# Patient Record
Sex: FEMALE | Race: WHITE | Employment: FULL TIME | URBAN - METROPOLITAN AREA
[De-identification: names, ages, dates, MRNs, and addresses within clinical notes are randomized per-mention and may not be internally consistent; named-entity substitution may affect disease eponyms.]

---

## 2019-07-26 ENCOUNTER — HOSPITAL ENCOUNTER (EMERGENCY)
Age: 19
Discharge: HOME | End: 2019-07-26
Attending: EMERGENCY MEDICINE
Payer: OTHER GOVERNMENT

## 2019-07-26 VITALS
WEIGHT: 140 LBS | TEMPERATURE: 98.3 F | SYSTOLIC BLOOD PRESSURE: 119 MMHG | OXYGEN SATURATION: 98 % | DIASTOLIC BLOOD PRESSURE: 68 MMHG | HEART RATE: 80 BPM | HEIGHT: 60 IN | BODY MASS INDEX: 27.48 KG/M2 | RESPIRATION RATE: 18 BRPM

## 2019-07-26 DIAGNOSIS — N94.89 FEMALE PELVIC CONGESTION SYNDROME: Primary | ICD-10-CM

## 2019-07-26 LAB
ALBUMIN SERPL-MCNC: 4.1 G/DL (ref 3.5–5)
ALBUMIN/GLOB SERPL: 1.1 {RATIO} (ref 1.2–3.5)
ALP SERPL-CCNC: 59 U/L (ref 50–136)
ALT SERPL-CCNC: 15 U/L (ref 12–65)
ANION GAP SERPL CALC-SCNC: 5 MMOL/L (ref 7–16)
AST SERPL-CCNC: 16 U/L (ref 15–37)
BACTERIA URNS QL MICRO: NORMAL /HPF
BASOPHILS # BLD: 0.1 K/UL (ref 0–0.2)
BASOPHILS NFR BLD: 1 % (ref 0–2)
BILIRUB SERPL-MCNC: 0.4 MG/DL (ref 0.2–1.1)
BUN SERPL-MCNC: 14 MG/DL (ref 6–23)
CALCIUM SERPL-MCNC: 8.7 MG/DL (ref 8.3–10.4)
CASTS URNS QL MICRO: 0 /LPF
CHLORIDE SERPL-SCNC: 105 MMOL/L (ref 98–107)
CO2 SERPL-SCNC: 28 MMOL/L (ref 21–32)
CREAT SERPL-MCNC: 0.64 MG/DL (ref 0.6–1)
DIFFERENTIAL METHOD BLD: NORMAL
EOSINOPHIL # BLD: 0.1 K/UL (ref 0–0.8)
EOSINOPHIL NFR BLD: 2 % (ref 0.5–7.8)
EPI CELLS #/AREA URNS HPF: NORMAL /HPF
ERYTHROCYTE [DISTWIDTH] IN BLOOD BY AUTOMATED COUNT: 13.2 % (ref 11.9–14.6)
GLOBULIN SER CALC-MCNC: 3.7 G/DL (ref 2.3–3.5)
GLUCOSE SERPL-MCNC: 87 MG/DL (ref 65–100)
HCG UR QL: NEGATIVE
HCT VFR BLD AUTO: 37.9 % (ref 35.8–46.3)
HGB BLD-MCNC: 12.3 G/DL (ref 11.7–15.4)
IMM GRANULOCYTES # BLD AUTO: 0 K/UL (ref 0–0.5)
IMM GRANULOCYTES NFR BLD AUTO: 0 % (ref 0–5)
LYMPHOCYTES # BLD: 1.8 K/UL (ref 0.5–4.6)
LYMPHOCYTES NFR BLD: 30 % (ref 13–44)
MCH RBC QN AUTO: 28.3 PG (ref 26.1–32.9)
MCHC RBC AUTO-ENTMCNC: 32.5 G/DL (ref 31.4–35)
MCV RBC AUTO: 87.1 FL (ref 79.6–97.8)
MONOCYTES # BLD: 0.4 K/UL (ref 0.1–1.3)
MONOCYTES NFR BLD: 7 % (ref 4–12)
NEUTS SEG # BLD: 3.7 K/UL (ref 1.7–8.2)
NEUTS SEG NFR BLD: 60 % (ref 43–78)
NRBC # BLD: 0 K/UL (ref 0–0.2)
PLATELET # BLD AUTO: 352 K/UL (ref 150–450)
PMV BLD AUTO: 10.9 FL (ref 9.4–12.3)
POTASSIUM SERPL-SCNC: 4 MMOL/L (ref 3.5–5.1)
PROT SERPL-MCNC: 7.8 G/DL (ref 6.3–8.2)
RBC # BLD AUTO: 4.35 M/UL (ref 4.05–5.2)
RBC #/AREA URNS HPF: NORMAL /HPF
SODIUM SERPL-SCNC: 138 MMOL/L (ref 136–145)
WBC # BLD AUTO: 6.2 K/UL (ref 4.3–11.1)
WBC URNS QL MICRO: NORMAL /HPF

## 2019-07-26 PROCEDURE — 85025 COMPLETE CBC W/AUTO DIFF WBC: CPT

## 2019-07-26 PROCEDURE — 74011250637 HC RX REV CODE- 250/637: Performed by: EMERGENCY MEDICINE

## 2019-07-26 PROCEDURE — 96374 THER/PROPH/DIAG INJ IV PUSH: CPT | Performed by: EMERGENCY MEDICINE

## 2019-07-26 PROCEDURE — 99284 EMERGENCY DEPT VISIT MOD MDM: CPT | Performed by: EMERGENCY MEDICINE

## 2019-07-26 PROCEDURE — 81003 URINALYSIS AUTO W/O SCOPE: CPT | Performed by: EMERGENCY MEDICINE

## 2019-07-26 PROCEDURE — 81015 MICROSCOPIC EXAM OF URINE: CPT

## 2019-07-26 PROCEDURE — 80053 COMPREHEN METABOLIC PANEL: CPT

## 2019-07-26 PROCEDURE — 74011250636 HC RX REV CODE- 250/636: Performed by: EMERGENCY MEDICINE

## 2019-07-26 PROCEDURE — 81025 URINE PREGNANCY TEST: CPT

## 2019-07-26 RX ORDER — KETOROLAC TROMETHAMINE 30 MG/ML
30 INJECTION, SOLUTION INTRAMUSCULAR; INTRAVENOUS
Status: COMPLETED | OUTPATIENT
Start: 2019-07-26 | End: 2019-07-26

## 2019-07-26 RX ORDER — TRAMADOL HYDROCHLORIDE 50 MG/1
100 TABLET ORAL
Status: COMPLETED | OUTPATIENT
Start: 2019-07-26 | End: 2019-07-26

## 2019-07-26 RX ORDER — NAPROXEN 500 MG/1
500 TABLET ORAL 2 TIMES DAILY WITH MEALS
Qty: 20 TAB | Refills: 0 | Status: SHIPPED | OUTPATIENT
Start: 2019-07-26 | End: 2019-12-05

## 2019-07-26 RX ORDER — TRAMADOL HYDROCHLORIDE 50 MG/1
100 TABLET ORAL
Qty: 19 TAB | Refills: 0 | Status: SHIPPED | OUTPATIENT
Start: 2019-07-26 | End: 2019-08-02

## 2019-07-26 RX ORDER — ONDANSETRON 4 MG/1
4 TABLET, ORALLY DISINTEGRATING ORAL
Status: COMPLETED | OUTPATIENT
Start: 2019-07-26 | End: 2019-07-26

## 2019-07-26 RX ORDER — MEDROXYPROGESTERONE ACETATE 10 MG/1
30 TABLET ORAL DAILY
Qty: 30 TAB | Refills: 0 | Status: SHIPPED | OUTPATIENT
Start: 2019-07-26 | End: 2019-08-05

## 2019-07-26 RX ADMIN — KETOROLAC TROMETHAMINE 30 MG: 30 INJECTION, SOLUTION INTRAMUSCULAR at 13:37

## 2019-07-26 RX ADMIN — SODIUM CHLORIDE 1000 ML: 900 INJECTION, SOLUTION INTRAVENOUS at 13:37

## 2019-07-26 RX ADMIN — TRAMADOL HYDROCHLORIDE 100 MG: 50 TABLET, FILM COATED ORAL at 15:15

## 2019-07-26 RX ADMIN — ONDANSETRON 4 MG: 4 TABLET, ORALLY DISINTEGRATING ORAL at 15:15

## 2019-07-26 NOTE — LETTER
[address] [address] EMERGENCY DEPT 
[address] [facility] 
Jen Alvarez [place] [address] 
[phone] Work/School Note Date: 7/26/2019 To Whom It May concern: 
 
Mosie Essex was seen and treated today in the emergency room by the following provider(s): 
Attending Provider: Cherylene Mulligan, MD. Mosie Essex was seen in the ED on 7/26/19. Sincerely, Gene Easton RN

## 2019-07-26 NOTE — DISCHARGE INSTRUCTIONS
Take medications as prescribed  Follow-up with gynecology  Do not drink alcohol or drive while taking the prescription pain medications    Return to ER for any worsening symptoms or new problems which may arise

## 2019-07-26 NOTE — ED PROVIDER NOTES
61-year-old female presents with acutely exacerbated. Pelvic pain. Patient's symptoms have been ongoing for quite some time    The history is provided by the patient. Abdominal Pain    This is a chronic problem. The current episode started more than 1 week ago. The problem occurs constantly. The problem has been gradually worsening. The pain is associated with an unknown factor. The pain is located in the suprapubic region. The quality of the pain is aching, dull and pressure-like. The pain is severe. Associated symptoms include nausea and back pain. Pertinent negatives include no fever, no diarrhea, no vomiting, no constipation, no dysuria, no frequency, no hematuria, no headaches, no arthralgias, no myalgias and no chest pain. The pain is worsened by activity. The pain is relieved by nothing. Past workup includes CT scan, ultrasound. The patient's surgical history non-contributory.        Past Medical History:   Diagnosis Date    Thyroid disease     goiter       Past Surgical History:   Procedure Laterality Date    HX TONSILLECTOMY           Family History:   Problem Relation Age of Onset    COPD Mother     Thyroid Disease Mother     Thyroid Disease Father     Other Sister     Asthma Brother        Social History     Socioeconomic History    Marital status: SINGLE     Spouse name: Not on file    Number of children: Not on file    Years of education: Not on file    Highest education level: Not on file   Occupational History    Not on file   Social Needs    Financial resource strain: Not on file    Food insecurity:     Worry: Not on file     Inability: Not on file    Transportation needs:     Medical: Not on file     Non-medical: Not on file   Tobacco Use    Smoking status: Never Smoker    Smokeless tobacco: Never Used   Substance and Sexual Activity    Alcohol use: Never     Frequency: Never    Drug use: Not on file    Sexual activity: Not on file   Lifestyle    Physical activity:     Days per week: Not on file     Minutes per session: Not on file    Stress: Not on file   Relationships    Social connections:     Talks on phone: Not on file     Gets together: Not on file     Attends Yazdanism service: Not on file     Active member of club or organization: Not on file     Attends meetings of clubs or organizations: Not on file     Relationship status: Not on file    Intimate partner violence:     Fear of current or ex partner: Not on file     Emotionally abused: Not on file     Physically abused: Not on file     Forced sexual activity: Not on file   Other Topics Concern    Not on file   Social History Narrative    Not on file         ALLERGIES: Patient has no known allergies. Review of Systems   Constitutional: Negative for chills and fever. HENT: Negative for congestion, ear pain and rhinorrhea. Eyes: Negative for photophobia and discharge. Respiratory: Negative for cough and shortness of breath. Cardiovascular: Negative for chest pain and palpitations. Gastrointestinal: Positive for abdominal pain and nausea. Negative for constipation, diarrhea and vomiting. Endocrine: Negative for cold intolerance and heat intolerance. Genitourinary: Negative for dysuria, flank pain, frequency and hematuria. Musculoskeletal: Positive for back pain. Negative for arthralgias, myalgias and neck pain. Skin: Negative for rash and wound. Allergic/Immunologic: Negative for environmental allergies and food allergies. Neurological: Negative for syncope and headaches. Hematological: Negative for adenopathy. Does not bruise/bleed easily. Psychiatric/Behavioral: Negative for dysphoric mood. The patient is not nervous/anxious. All other systems reviewed and are negative.       Vitals:    07/26/19 1225   BP: 129/82   Pulse: 79   Resp: 18   Temp: 98.3 °F (36.8 °C)   SpO2: 98%   Weight: 63.5 kg (140 lb)   Height: 5' (1.524 m)            Physical Exam   Constitutional: She is oriented to person, place, and time. She appears well-developed and well-nourished. She appears distressed. HENT:   Head: Normocephalic and atraumatic. Right Ear: External ear normal.   Left Ear: External ear normal.   Mouth/Throat: Oropharynx is clear and moist. No oropharyngeal exudate. Eyes: Pupils are equal, round, and reactive to light. Conjunctivae and EOM are normal.   Neck: Normal range of motion. Neck supple. No JVD present. Cardiovascular: Normal rate, regular rhythm, normal heart sounds and intact distal pulses. Exam reveals no gallop and no friction rub. No murmur heard. Pulmonary/Chest: Effort normal and breath sounds normal.   Abdominal: Soft. Normal appearance and bowel sounds are normal. She exhibits no distension and no mass. There is no hepatosplenomegaly. There is tenderness in the suprapubic area. There is no rigidity and no guarding. Musculoskeletal: Normal range of motion. She exhibits no edema or deformity. Neurological: She is alert and oriented to person, place, and time. She has normal strength. No cranial nerve deficit or sensory deficit. She displays a negative Romberg sign. Gait normal.   Skin: Skin is warm and dry. Capillary refill takes less than 2 seconds. No rash noted. Psychiatric: She has a normal mood and affect. Her speech is normal and behavior is normal. Judgment and thought content normal. Cognition and memory are normal.   Nursing note and vitals reviewed. 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   Given patient's current insurance status         Amount and/or Complexity of Data Reviewed  Clinical lab tests: ordered and reviewed  Tests in the radiology section of CPT®: reviewed  Tests in the medicine section of CPT®: reviewed  Review and summarize past medical records: yes    Risk of Complications, Morbidity, and/or Mortality  Presenting problems: moderate  Diagnostic procedures: moderate  Management options: moderate  General comments: Elements of this note have been dictated via voice recognition software. Text and phrases may be limited by the accuracy of the software. The chart has been reviewed, but errors may still be present.       Patient Progress  Patient progress: stable         Procedures

## 2019-07-26 NOTE — ED NOTES
I have reviewed discharge instructions with the patient. The patient verbalized understanding. Patient left ED via Discharge Method: ambulatory to Home with father. Opportunity for questions and clarification provided. Patient given 3 scripts. To continue your aftercare when you leave the hospital, you may receive an automated call from our care team to check in on how you are doing. This is a free service and part of our promise to provide the best care and service to meet your aftercare needs.  If you have questions, or wish to unsubscribe from this service please call [phone]. Thank you for Choosing our [facility] Emergency Department.

## 2019-07-26 NOTE — ED TRIAGE NOTES
Pt c/o abdominal pain that is not going away. She has a CT report from CT that was done on 07/23/2019. Pt has had numerous workups and no one can tell her why she is having this pain.

## 2019-07-26 NOTE — PROGRESS NOTES
LINDA gave pt info for [facility], Suhail Trotter and Dr. Patrica Anders for resources for follow up GYN care. LINDA also advised pt to call her previous GYN, Dr. Katlin Cunha, and inquire about office payment. SW explained to pt if she got Kylie application from [facility] and met qualifications then it would cover ER aishwarya and Dr. Patrica Anders since he is a [place] [address] physician. Pt aware both of those offices are closed for the day. LINDA updated MD.   No additional questions or needs identified at this time.    Zaira Arambula

## 2019-12-10 ENCOUNTER — HOSPITAL ENCOUNTER (OUTPATIENT)
Dept: ULTRASOUND IMAGING | Age: 19
Discharge: HOME | End: 2019-12-10
Attending: NURSE PRACTITIONER

## 2019-12-10 DIAGNOSIS — E04.9 GOITER: ICD-10-CM

## 2019-12-12 ENCOUNTER — APPOINTMENT (RX ONLY)
Dept: URBAN - METROPOLITAN AREA CLINIC 349 | Facility: CLINIC | Age: 19
Setting detail: DERMATOLOGY
End: 2019-12-12

## 2019-12-12 DIAGNOSIS — Q819 OTHER SPECIFIED ANOMALIES OF SKIN: ICD-10-CM

## 2019-12-12 DIAGNOSIS — Q826 OTHER SPECIFIED ANOMALIES OF SKIN: ICD-10-CM

## 2019-12-12 DIAGNOSIS — Q828 OTHER SPECIFIED ANOMALIES OF SKIN: ICD-10-CM

## 2019-12-12 PROBLEM — E05.90 THYROTOXICOSIS, UNSPECIFIED WITHOUT THYROTOXIC CRISIS OR STORM: Status: ACTIVE | Noted: 2019-12-12

## 2019-12-12 PROBLEM — F41.9 ANXIETY DISORDER, UNSPECIFIED: Status: ACTIVE | Noted: 2019-12-12

## 2019-12-12 PROBLEM — Q82.8 OTHER SPECIFIED CONGENITAL MALFORMATIONS OF SKIN: Status: ACTIVE | Noted: 2019-12-12

## 2019-12-12 PROCEDURE — ? PRESCRIPTION

## 2019-12-12 PROCEDURE — 99242 OFF/OP CONSLTJ NEW/EST SF 20: CPT

## 2019-12-12 PROCEDURE — ? RECOMMENDATIONS

## 2019-12-12 PROCEDURE — ? COUNSELING

## 2019-12-12 PROCEDURE — ? RETURN TO REFERRING PROVIDER

## 2019-12-12 RX ORDER — MOMETASONE FUROATE 1 MG/G
CREAM TOPICAL
Qty: 1 | Refills: 1 | Status: ERX | COMMUNITY
Start: 2019-12-12

## 2019-12-12 RX ADMIN — MOMETASONE FUROATE: 1 CREAM TOPICAL at 00:00

## 2019-12-12 ASSESSMENT — LOCATION SIMPLE DESCRIPTION DERM
LOCATION SIMPLE: LEFT FOREARM
LOCATION SIMPLE: RIGHT BUTTOCK
LOCATION SIMPLE: LEFT BUTTOCK
LOCATION SIMPLE: RIGHT FOREARM

## 2019-12-12 ASSESSMENT — LOCATION ZONE DERM
LOCATION ZONE: ARM
LOCATION ZONE: TRUNK

## 2019-12-12 ASSESSMENT — LOCATION DETAILED DESCRIPTION DERM
LOCATION DETAILED: RIGHT BUTTOCK
LOCATION DETAILED: LEFT BUTTOCK
LOCATION DETAILED: LEFT DISTAL DORSAL FOREARM
LOCATION DETAILED: RIGHT PROXIMAL RADIAL DORSAL FOREARM

## 2019-12-13 PROBLEM — E04.9 GOITER: Status: ACTIVE | Noted: 2019-12-13

## 2019-12-13 PROBLEM — R09.89 GLOBUS PHARYNGEUS: Status: ACTIVE | Noted: 2019-12-13

## 2019-12-13 PROBLEM — Z80.8 FAMILY HISTORY OF THYROID CANCER: Status: ACTIVE | Noted: 2019-12-13

## 2020-03-10 ENCOUNTER — HOSPITAL ENCOUNTER (OUTPATIENT)
Dept: LAB | Age: 20
Discharge: HOME | End: 2020-03-10
Payer: OTHER GOVERNMENT

## 2020-03-10 DIAGNOSIS — M79.10 MYALGIA: ICD-10-CM

## 2020-03-10 LAB
ALBUMIN SERPL-MCNC: 4.5 G/DL (ref 3.5–5)
ALBUMIN/GLOB SERPL: 1.4 {RATIO} (ref 1.2–3.5)
ALP SERPL-CCNC: 54 U/L (ref 50–136)
ALT SERPL-CCNC: 28 U/L (ref 12–65)
ANION GAP SERPL CALC-SCNC: 17 MMOL/L (ref 7–16)
APPEARANCE UR: CLEAR
AST SERPL-CCNC: 29 U/L (ref 15–37)
BACTERIA URNS QL MICRO: NORMAL /HPF
BASOPHILS # BLD: 0.1 K/UL (ref 0–0.2)
BASOPHILS NFR BLD: 1 % (ref 0–2)
BILIRUB SERPL-MCNC: 0.6 MG/DL (ref 0.2–1.1)
BILIRUB UR QL: NEGATIVE
BUN SERPL-MCNC: 12 MG/DL (ref 6–23)
CALCIUM SERPL-MCNC: 8.8 MG/DL (ref 8.3–10.4)
CASTS URNS QL MICRO: NORMAL /LPF
CHLORIDE SERPL-SCNC: 107 MMOL/L (ref 98–107)
CK SERPL-CCNC: 789 U/L (ref 21–215)
CO2 SERPL-SCNC: 14 MMOL/L (ref 21–32)
COLOR UR: YELLOW
CREAT SERPL-MCNC: 0.55 MG/DL (ref 0.6–1)
CRP SERPL HS-MCNC: 2.7 MG/L
DIFFERENTIAL METHOD BLD: NORMAL
EOSINOPHIL # BLD: 0.1 K/UL (ref 0–0.8)
EOSINOPHIL NFR BLD: 2 % (ref 0.5–7.8)
EPI CELLS #/AREA URNS HPF: NORMAL /HPF
ERYTHROCYTE [DISTWIDTH] IN BLOOD BY AUTOMATED COUNT: 12.2 % (ref 11.9–14.6)
ERYTHROCYTE [SEDIMENTATION RATE] IN BLOOD: 9 MM/HR (ref 0–20)
GLOBULIN SER CALC-MCNC: 3.3 G/DL (ref 2.3–3.5)
GLUCOSE SERPL-MCNC: 93 MG/DL (ref 65–100)
GLUCOSE UR STRIP.AUTO-MCNC: NEGATIVE MG/DL
HCT VFR BLD AUTO: 39.3 % (ref 35.8–46.3)
HGB BLD-MCNC: 12.9 G/DL (ref 11.7–15.4)
HGB UR QL STRIP: NEGATIVE
IMM GRANULOCYTES # BLD AUTO: 0 K/UL (ref 0–0.5)
IMM GRANULOCYTES NFR BLD AUTO: 0 % (ref 0–5)
KETONES UR QL STRIP.AUTO: NEGATIVE MG/DL
LEUKOCYTE ESTERASE UR QL STRIP.AUTO: NEGATIVE
LYMPHOCYTES # BLD: 2.1 K/UL (ref 0.5–4.6)
LYMPHOCYTES NFR BLD: 34 % (ref 13–44)
MCH RBC QN AUTO: 29.1 PG (ref 26.1–32.9)
MCHC RBC AUTO-ENTMCNC: 32.8 G/DL (ref 31.4–35)
MCV RBC AUTO: 88.5 FL (ref 79.6–97.8)
MONOCYTES # BLD: 0.5 K/UL (ref 0.1–1.3)
MONOCYTES NFR BLD: 8 % (ref 4–12)
MYOGLOBIN SERPL-MCNC: 49 NG/ML (ref 9–82)
NEUTS SEG # BLD: 3.4 K/UL (ref 1.7–8.2)
NEUTS SEG NFR BLD: 56 % (ref 43–78)
NITRITE UR QL STRIP.AUTO: NEGATIVE
NRBC # BLD: 0 K/UL (ref 0–0.2)
PH UR STRIP: 6 [PH] (ref 5–9)
PLATELET # BLD AUTO: 307 K/UL (ref 150–450)
PMV BLD AUTO: 10.6 FL (ref 9.4–12.3)
POTASSIUM SERPL-SCNC: 3.9 MMOL/L (ref 3.5–5.1)
PROT SERPL-MCNC: 7.8 G/DL (ref 6.3–8.2)
PROT UR STRIP-MCNC: NEGATIVE MG/DL
RBC # BLD AUTO: 4.44 M/UL (ref 4.05–5.2)
RBC #/AREA URNS HPF: NORMAL /HPF
SODIUM SERPL-SCNC: 138 MMOL/L (ref 136–145)
UROBILINOGEN UR QL STRIP.AUTO: 0.2 EU/DL (ref 0.2–1)
WBC # BLD AUTO: 6 K/UL (ref 4.3–11.1)
WBC URNS QL MICRO: NORMAL /HPF

## 2020-03-10 PROCEDURE — 87798 DETECT AGENT NOS DNA AMP: CPT

## 2020-03-10 PROCEDURE — 36415 COLL VENOUS BLD VENIPUNCTURE: CPT

## 2020-03-10 PROCEDURE — 82550 ASSAY OF CK (CPK): CPT

## 2020-03-10 PROCEDURE — 81001 URINALYSIS AUTO W/SCOPE: CPT

## 2020-03-10 PROCEDURE — 85652 RBC SED RATE AUTOMATED: CPT

## 2020-03-10 PROCEDURE — 85025 COMPLETE CBC W/AUTO DIFF WBC: CPT

## 2020-03-10 PROCEDURE — 87086 URINE CULTURE/COLONY COUNT: CPT

## 2020-03-10 PROCEDURE — 86141 C-REACTIVE PROTEIN HS: CPT

## 2020-03-10 PROCEDURE — 80053 COMPREHEN METABOLIC PANEL: CPT

## 2020-03-10 PROCEDURE — 83874 ASSAY OF MYOGLOBIN: CPT

## 2020-03-10 NOTE — PROGRESS NOTES
The CO2 is likely lab error. Was 28 on labs she did last night. No diarrheal illness to explain a drop to 14. She is following up tomorrow.

## 2020-03-11 ENCOUNTER — HOSPITAL ENCOUNTER (EMERGENCY)
Age: 20
Discharge: HOME | End: 2020-03-11
Attending: EMERGENCY MEDICINE
Payer: OTHER GOVERNMENT

## 2020-03-11 ENCOUNTER — APPOINTMENT (OUTPATIENT)
Dept: GENERAL RADIOLOGY | Age: 20
End: 2020-03-11
Attending: EMERGENCY MEDICINE
Payer: OTHER GOVERNMENT

## 2020-03-11 VITALS
RESPIRATION RATE: 18 BRPM | HEIGHT: 60 IN | HEART RATE: 66 BPM | OXYGEN SATURATION: 98 % | WEIGHT: 133 LBS | SYSTOLIC BLOOD PRESSURE: 109 MMHG | TEMPERATURE: 98 F | DIASTOLIC BLOOD PRESSURE: 59 MMHG | BODY MASS INDEX: 26.11 KG/M2

## 2020-03-11 DIAGNOSIS — B34.9 VIRAL ILLNESS: Primary | ICD-10-CM

## 2020-03-11 LAB
ALBUMIN SERPL-MCNC: 3.9 G/DL (ref 3.5–5)
ALBUMIN/GLOB SERPL: 1 {RATIO} (ref 1.2–3.5)
ALP SERPL-CCNC: 52 U/L (ref 50–136)
ALT SERPL-CCNC: 24 U/L (ref 12–65)
ANION GAP SERPL CALC-SCNC: 4 MMOL/L (ref 7–16)
AST SERPL-CCNC: 19 U/L (ref 15–37)
ATRIAL RATE: 96 BPM
BASOPHILS # BLD: 0.1 K/UL (ref 0–0.2)
BASOPHILS NFR BLD: 1 % (ref 0–2)
BILIRUB SERPL-MCNC: 0.6 MG/DL (ref 0.2–1.1)
BUN SERPL-MCNC: 10 MG/DL (ref 6–23)
CALCIUM SERPL-MCNC: 8.7 MG/DL (ref 8.3–10.4)
CALCULATED P AXIS, ECG09: 85 DEGREES
CALCULATED R AXIS, ECG10: 78 DEGREES
CALCULATED T AXIS, ECG11: 57 DEGREES
CHLORIDE SERPL-SCNC: 107 MMOL/L (ref 98–107)
CK SERPL-CCNC: 365 U/L (ref 21–215)
CO2 SERPL-SCNC: 28 MMOL/L (ref 21–32)
CREAT SERPL-MCNC: 0.59 MG/DL (ref 0.6–1)
DEPRECATED S PYO AG THROAT QL EIA: NEGATIVE
DIAGNOSIS, 93000: NORMAL
DIFFERENTIAL METHOD BLD: NORMAL
EOSINOPHIL # BLD: 0.1 K/UL (ref 0–0.8)
EOSINOPHIL NFR BLD: 2 % (ref 0.5–7.8)
ERYTHROCYTE [DISTWIDTH] IN BLOOD BY AUTOMATED COUNT: 12.3 % (ref 11.9–14.6)
GLOBULIN SER CALC-MCNC: 3.8 G/DL (ref 2.3–3.5)
GLUCOSE SERPL-MCNC: 98 MG/DL (ref 65–100)
HCG UR QL: NEGATIVE
HCT VFR BLD AUTO: 41.7 % (ref 35.8–46.3)
HGB BLD-MCNC: 13.8 G/DL (ref 11.7–15.4)
IMM GRANULOCYTES # BLD AUTO: 0 K/UL (ref 0–0.5)
IMM GRANULOCYTES NFR BLD AUTO: 0 % (ref 0–5)
LIPASE SERPL-CCNC: 79 U/L (ref 73–393)
LYMPHOCYTES # BLD: 2 K/UL (ref 0.5–4.6)
LYMPHOCYTES NFR BLD: 39 % (ref 13–44)
MCH RBC QN AUTO: 29.2 PG (ref 26.1–32.9)
MCHC RBC AUTO-ENTMCNC: 33.1 G/DL (ref 31.4–35)
MCV RBC AUTO: 88.3 FL (ref 79.6–97.8)
MONOCYTES # BLD: 0.4 K/UL (ref 0.1–1.3)
MONOCYTES NFR BLD: 7 % (ref 4–12)
NEUTS SEG # BLD: 2.6 K/UL (ref 1.7–8.2)
NEUTS SEG NFR BLD: 51 % (ref 43–78)
NRBC # BLD: 0 K/UL (ref 0–0.2)
P-R INTERVAL, ECG05: 156 MS
PLATELET # BLD AUTO: 296 K/UL (ref 150–450)
PMV BLD AUTO: 10.7 FL (ref 9.4–12.3)
POTASSIUM SERPL-SCNC: 3.9 MMOL/L (ref 3.5–5.1)
PROT SERPL-MCNC: 7.7 G/DL (ref 6.3–8.2)
Q-T INTERVAL, ECG07: 336 MS
QRS DURATION, ECG06: 80 MS
QTC CALCULATION (BEZET), ECG08: 424 MS
RBC # BLD AUTO: 4.72 M/UL (ref 4.05–5.2)
SODIUM SERPL-SCNC: 139 MMOL/L (ref 136–145)
TROPONIN I SERPL-MCNC: <0.02 NG/ML (ref 0.02–0.05)
TSH SERPL DL<=0.005 MIU/L-ACNC: 1.78 UIU/ML (ref 0.36–3.74)
VENTRICULAR RATE, ECG03: 96 BPM
WBC # BLD AUTO: 5.1 K/UL (ref 4.3–11.1)

## 2020-03-11 PROCEDURE — 85025 COMPLETE CBC W/AUTO DIFF WBC: CPT

## 2020-03-11 PROCEDURE — 82550 ASSAY OF CK (CPK): CPT

## 2020-03-11 PROCEDURE — 80053 COMPREHEN METABOLIC PANEL: CPT

## 2020-03-11 PROCEDURE — 87081 CULTURE SCREEN ONLY: CPT

## 2020-03-11 PROCEDURE — 93005 ELECTROCARDIOGRAM TRACING: CPT | Performed by: EMERGENCY MEDICINE

## 2020-03-11 PROCEDURE — 83690 ASSAY OF LIPASE: CPT

## 2020-03-11 PROCEDURE — 96374 THER/PROPH/DIAG INJ IV PUSH: CPT

## 2020-03-11 PROCEDURE — 84484 ASSAY OF TROPONIN QUANT: CPT

## 2020-03-11 PROCEDURE — 74011250636 HC RX REV CODE- 250/636: Performed by: EMERGENCY MEDICINE

## 2020-03-11 PROCEDURE — 99283 EMERGENCY DEPT VISIT LOW MDM: CPT

## 2020-03-11 PROCEDURE — 81025 URINE PREGNANCY TEST: CPT

## 2020-03-11 PROCEDURE — 71046 X-RAY EXAM CHEST 2 VIEWS: CPT

## 2020-03-11 PROCEDURE — 84443 ASSAY THYROID STIM HORMONE: CPT

## 2020-03-11 PROCEDURE — 87880 STREP A ASSAY W/OPTIC: CPT

## 2020-03-11 RX ORDER — KETOROLAC TROMETHAMINE 30 MG/ML
30 INJECTION, SOLUTION INTRAMUSCULAR; INTRAVENOUS
Status: COMPLETED | OUTPATIENT
Start: 2020-03-11 | End: 2020-03-11

## 2020-03-11 RX ADMIN — KETOROLAC TROMETHAMINE 30 MG: 30 INJECTION, SOLUTION INTRAMUSCULAR at 11:12

## 2020-03-11 RX ADMIN — SODIUM CHLORIDE 1000 ML: 900 INJECTION, SOLUTION INTRAVENOUS at 10:45

## 2020-03-11 NOTE — ED TRIAGE NOTES
Patient presents to the ed from work well due to generalized muscle pain and not feeling well over the past 3-4 days. Patient also complains of chest pain today and states she feels anxious. Patient denies any shortness of breath, cough, or congestion.

## 2020-03-11 NOTE — DISCHARGE INSTRUCTIONS
Patient Education        Viral Infections: Care Instructions  Your Care Instructions    You don't feel well, but it's not clear what's causing it. You may have a viral infection. Viruses cause many illnesses, such as the common cold, influenza, fever, rashes, and the diarrhea, nausea, and vomiting that are often called \"stomach flu. \" You may wonder if antibiotic medicines could make you feel better. But antibiotics only treat infections caused by bacteria. They don't work on viruses. The good news is that viral infections usually aren't serious. Most will go away in a few days without medical treatment. In the meantime, there are a few things you can do to make yourself more comfortable. Follow-up care is a key part of your treatment and safety. Be sure to make and go to all appointments, and call your doctor if you are having problems. It's also a good idea to know your test results and keep a list of the medicines you take. How can you care for yourself at home? · Get plenty of rest if you feel tired. · Take an over-the-counter pain medicine if needed, such as acetaminophen (Tylenol), ibuprofen (Advil, Motrin), or naproxen (Aleve). Read and follow all instructions on the label. · Be careful when taking over-the-counter cold or flu medicines and Tylenol at the same time. Many of these medicines have acetaminophen, which is Tylenol. Read the labels to make sure that you are not taking more than the recommended dose. Too much acetaminophen (Tylenol) can be harmful. · Drink plenty of fluids, enough so that your urine is light yellow or clear like water. If you have kidney, heart, or liver disease and have to limit fluids, talk with your doctor before you increase the amount of fluids you drink. · Stay home from work, school, and other public places while you have a fever. When should you call for help? Call 911 anytime you think you may need emergency care.  For example, call if:    · You have severe trouble breathing.     · You passed out (lost consciousness).    Call your doctor now or seek immediate medical care if:    · You seem to be getting much sicker.     · You have a new or higher fever.     · You have blood in your stools.     · You have new belly pain, or your pain gets worse.     · You have a new rash.    Watch closely for changes in your health, and be sure to contact your doctor if:    · You start to get better and then get worse.     · You do not get better as expected. Where can you learn more? Go to http://kentrell-flavia.info/. Enter O348 in the search box to learn more about \"Viral Infections: Care Instructions. \"  Current as of: June 9, 2019  Content Version: 12.2  © 3513-2850 [employer]. Care instructions adapted under license by [employer] (which disclaims liability or warranty for this information). If you have questions about a medical condition or this instruction, always ask your healthcare professional. [address] any warranty or liability for your use of this information.

## 2020-03-11 NOTE — LETTER
[address] [facility] EMERGENCY DEPT 
[address] [facility] [address]LA[address] Dov[facility]ksHen[facility] 
[phone] Work/School Note Date: 3/11/2020 To Whom It May concern: 
 
Damari Espinoza was seen and treated today in the emergency room by the following provider(s): 
Attending Provider: Jl Watt MD. Damari Espinoza was seen at the ER on 03/11/2020. Sincerely, [address] [facility]

## 2020-03-11 NOTE — ED NOTES
I have reviewed discharge instructions with the patient. The patient verbalized understanding. Patient left ED via Discharge Method: ambulatory to Home with self. Opportunity for questions and clarification provided. Patient given 0 scripts. To continue your aftercare when you leave the hospital, you may receive an automated call from our care team to check in on how you are doing. This is a free service and part of our promise to provide the best care and service to meet your aftercare needs.  If you have questions, or wish to unsubscribe from this service please call [phone]. Thank you for Choosing our [facility] Emergency Department.

## 2020-03-11 NOTE — ED PROVIDER NOTES
Patient presents from [facility] due to body aches fatigue and weakness for the past 3 to 4 days. Also with frontal headache. Had some chest pain earlier today and feels anxious. No shortness of breath runny nose ear pain cough or congestion. Mild sore throat. Had an elevated CK with work well. Hydrated overnight but still feeling bad so came back. The history is provided by the patient. No  was used. Muscle Pain    This is a new problem. The current episode started more than 2 days ago. The problem occurs constantly. The problem has been gradually worsening. Pain location: diffuse. The quality of the pain is described as aching. The pain is mild. Pertinent negatives include no numbness, full range of motion, no back pain and no neck pain. The symptoms are aggravated by movement. She has tried nothing for the symptoms. There has been no history of extremity trauma.         Past Medical History:   Diagnosis Date    Goiter     Hashimoto's thyroiditis     Vitamin D deficiency        Past Surgical History:   Procedure Laterality Date    HX ORTHOPAEDIC  age 1    Achilles tendon lengthening    HX TONSILLECTOMY           Family History:   Problem Relation Age of Onset   22 [facility] COPD Mother     Thyroid Disease Mother         hypothyroidism    Thyroid Disease Father         hyperthyroidism    Asthma Brother     Ovarian Cancer Maternal Grandmother     Dementia Paternal Grandmother     Thyroid Cancer Maternal Cousin        Social History     Socioeconomic History    Marital status:      Spouse name: Not on file    Number of children: Not on file    Years of education: Not on file    Highest education level: Not on file   Occupational History    Not on file   Social Needs    Financial resource strain: Not on file    Food insecurity     Worry: Not on file     Inability: Not on file    Transportation needs     Medical: Not on file     Non-medical: Not on file   Tobacco Use  Smoking status: Never Smoker    Smokeless tobacco: Never Used   Substance and Sexual Activity    Alcohol use: Never     Frequency: Never    Drug use: Not Currently    Sexual activity: Not Currently     Partners: Male     Birth control/protection: Inserts     Comment: NuvaRing   Lifestyle    Physical activity     Days per week: Not on file     Minutes per session: Not on file    Stress: Not on file   Relationships    Social connections     Talks on phone: Not on file     Gets together: Not on file     Attends [employer] service: Not on file     Active member of club or organization: Not on file     Attends meetings of clubs or organizations: Not on file     Relationship status: Not on file    Intimate partner violence     Fear of current or ex partner: Not on file     Emotionally abused: Not on file     Physically abused: Not on file     Forced sexual activity: Not on file   Other Topics Concern    Not on file   Social History Narrative    Not on file         ALLERGIES: Patient has no known allergies. Review of Systems   Constitutional: Positive for fatigue. Negative for chills and fever. HENT: Positive for sore throat. Negative for congestion, rhinorrhea and trouble swallowing. Eyes: Negative for pain, redness and visual disturbance. Respiratory: Negative for cough, chest tightness, shortness of breath and wheezing. Cardiovascular: Negative for chest pain and leg swelling. Gastrointestinal: Positive for abdominal pain (lower). Negative for diarrhea, nausea and vomiting. Genitourinary: Negative for dysuria, hematuria, vaginal bleeding and vaginal discharge. Musculoskeletal: Positive for myalgias. Negative for back pain, gait problem, neck pain and neck stiffness. Skin: Negative for color change and rash. Neurological: Positive for weakness and headaches. Negative for numbness.        Vitals:    03/11/20 1041   BP: 126/87   Pulse: 85   Resp: 18   Temp: 98 °F (36.7 °C)   SpO2: 97% Weight: 60.3 kg (133 lb)   Height: 5' (1.524 m)            Physical Exam  Constitutional:       General: She is not in acute distress. Appearance: Normal appearance. She is well-developed. HENT:      Head: Normocephalic and atraumatic. Right Ear: Tympanic membrane normal.      Left Ear: Tympanic membrane normal.      Mouth/Throat:      Mouth: Mucous membranes are moist.      Pharynx: No oropharyngeal exudate or posterior oropharyngeal erythema. Eyes:      Extraocular Movements: Extraocular movements intact. Pupils: Pupils are equal, round, and reactive to light. Neck:      Musculoskeletal: Normal range of motion and neck supple. No neck rigidity or muscular tenderness. Cardiovascular:      Rate and Rhythm: Normal rate and regular rhythm. Pulmonary:      Effort: Pulmonary effort is normal. No respiratory distress. Breath sounds: Normal breath sounds. Chest:      Chest wall: No tenderness. Abdominal:      General: Bowel sounds are normal.      Palpations: Abdomen is soft. Tenderness: There is abdominal tenderness (mild lower abd). Musculoskeletal: Normal range of motion. General: No swelling. Lymphadenopathy:      Cervical: No cervical adenopathy. Skin:     General: Skin is warm and dry. Neurological:      General: No focal deficit present. Mental Status: She is alert and oriented to person, place, and time. MDM  Number of Diagnoses or Management Options  Diagnosis management comments: With viral illness and body aches. CK improving. Will discharge. Amount and/or Complexity of Data Reviewed  Clinical lab tests: ordered and reviewed  Tests in the radiology section of CPT®: ordered and reviewed  Tests in the medicine section of CPT®: ordered and reviewed    Patient Progress  Patient progress: stable         Procedures      EKG: normal sinus rhythm, nonspecific ST and T waves changes.       XR CHEST PA LAT (Final result)   Result time 03/11/20 11:26:19   Final result by Flavio Mina MD (03/11/20 11:26:19)                Impression:    Impression:      No acute cardiopulmonary process. CPT code(s) 36225                  Narrative:    Clinical History: The patient is a 22years year old Female presenting with  symptoms of chest pain. Comparison:  none    Findings:  Frontal and lateral views of the chest were obtained. Lungs are clear without focal infiltrate or consolidation.  No pleural effusions  are seen.  The cardiomediastinal silhouette is within normal limits.  There are  no acute osseous abnormalities.                  Results Include:    Recent Results (from the past 24 hour(s))   METABOLIC PANEL, COMPREHENSIVE    Collection Time: 03/10/20 12:30 PM   Result Value Ref Range    Sodium 138 136 - 145 mmol/L    Potassium 3.9 3.5 - 5.1 mmol/L    Chloride 107 98 - 107 mmol/L    CO2 14 (L) 21 - 32 mmol/L    Anion gap 17 (H) 7 - 16 mmol/L    Glucose 93 65 - 100 mg/dL    BUN 12 6 - 23 MG/DL    Creatinine 0.55 (L) 0.6 - 1.0 MG/DL    GFR est AA >60 >60 ml/min/1.73m2    GFR est non-AA >60 >60 ml/min/1.73m2    Calcium 8.8 8.3 - 10.4 MG/DL    Bilirubin, total 0.6 0.2 - 1.1 MG/DL    ALT (SGPT) 28 12 - 65 U/L    AST (SGOT) 29 15 - 37 U/L    Alk.  phosphatase 54 50 - 136 U/L    Protein, total 7.8 6.3 - 8.2 g/dL    Albumin 4.5 3.5 - 5.0 g/dL    Globulin 3.3 2.3 - 3.5 g/dL    A-G Ratio 1.4 1.2 - 3.5     CBC WITH AUTOMATED DIFF    Collection Time: 03/10/20 12:30 PM   Result Value Ref Range    WBC 6.0 4.3 - 11.1 K/uL    RBC 4.44 4.05 - 5.2 M/uL    HGB 12.9 11.7 - 15.4 g/dL    HCT 39.3 35.8 - 46.3 %    MCV 88.5 79.6 - 97.8 FL    MCH 29.1 26.1 - 32.9 PG    MCHC 32.8 31.4 - 35.0 g/dL    RDW 12.2 11.9 - 14.6 %    PLATELET 241 390 - 693 K/uL    MPV 10.6 9.4 - 12.3 FL    ABSOLUTE NRBC 0.00 0.0 - 0.2 K/uL    DF AUTOMATED      NEUTROPHILS 56 43 - 78 %    LYMPHOCYTES 34 13 - 44 %    MONOCYTES 8 4.0 - 12.0 %    EOSINOPHILS 2 0.5 - 7.8 %    BASOPHILS 1 0.0 - 2.0 % IMMATURE GRANULOCYTES 0 0.0 - 5.0 %    ABS. NEUTROPHILS 3.4 1.7 - 8.2 K/UL    ABS. LYMPHOCYTES 2.1 0.5 - 4.6 K/UL    ABS. MONOCYTES 0.5 0.1 - 1.3 K/UL    ABS. EOSINOPHILS 0.1 0.0 - 0.8 K/UL    ABS. BASOPHILS 0.1 0.0 - 0.2 K/UL    ABS. IMM.  GRANS. 0.0 0.0 - 0.5 K/UL   CK    Collection Time: 03/10/20 12:30 PM   Result Value Ref Range     (H) 21 - 215 U/L   CK ISOENZYMES    Collection Time: 03/10/20 12:30 PM   Result Value Ref Range    Creatine Kinase,Total 773 (H) 24 - 173 U/L    Macro Type 2 PENDING %    CK-MM PENDING %    Macro Type 1 PENDING %    CK-MB PENDING %    CK-BB PENDING %   SED RATE, AUTOMATED    Collection Time: 03/10/20 12:30 PM   Result Value Ref Range    Sed rate, automated 9 0 - 20 mm/hr   CRP, HIGH SENSITIVITY    Collection Time: 03/10/20 12:30 PM   Result Value Ref Range    CRP, High sensitivity 2.7 mg/L   MYOGLOBIN    Collection Time: 03/10/20 12:30 PM   Result Value Ref Range    Myoglobin 49 9 - 82 ng/mL   URINALYSIS W/MICROSCOPIC    Collection Time: 03/10/20 12:33 PM   Result Value Ref Range    Color YELLOW      Appearance CLEAR      pH (UA) 6.0 5.0 - 9.0      Protein NEGATIVE  NEG mg/dL    Glucose NEGATIVE  mg/dL    Ketone NEGATIVE  NEG mg/dL    Bilirubin NEGATIVE  NEG      Blood NEGATIVE  NEG      Urobilinogen 0.2 0.2 - 1.0 EU/dL    Nitrites NEGATIVE  NEG      Leukocyte Esterase NEGATIVE  NEG      WBC 0-3 0 /hpf    RBC 0-3 0 /hpf    Epithelial cells 3-5 0 /hpf    Bacteria TRACE 0 /hpf    Casts 0-3 0 /lpf   EKG, 12 LEAD, INITIAL    Collection Time: 03/11/20 10:43 AM   Result Value Ref Range    Ventricular Rate 96 BPM    Atrial Rate 96 BPM    P-R Interval 156 ms    QRS Duration 80 ms    Q-T Interval 336 ms    QTC Calculation (Bezet) 424 ms    Calculated P Axis 85 degrees    Calculated R Axis 78 degrees    Calculated T Axis 57 degrees    Diagnosis       Normal sinus rhythm  Right atrial enlargement  Borderline ECG  No previous ECGs available     CBC WITH AUTOMATED DIFF    Collection Time: 03/11/20 10:44 AM   Result Value Ref Range    WBC 5.1 4.3 - 11.1 K/uL    RBC 4.72 4.05 - 5.2 M/uL    HGB 13.8 11.7 - 15.4 g/dL    HCT 41.7 35.8 - 46.3 %    MCV 88.3 79.6 - 97.8 FL    MCH 29.2 26.1 - 32.9 PG    MCHC 33.1 31.4 - 35.0 g/dL    RDW 12.3 11.9 - 14.6 %    PLATELET 727 985 - 165 K/uL    MPV 10.7 9.4 - 12.3 FL    ABSOLUTE NRBC 0.00 0.0 - 0.2 K/uL    DF AUTOMATED      NEUTROPHILS 51 43 - 78 %    LYMPHOCYTES 39 13 - 44 %    MONOCYTES 7 4.0 - 12.0 %    EOSINOPHILS 2 0.5 - 7.8 %    BASOPHILS 1 0.0 - 2.0 %    IMMATURE GRANULOCYTES 0 0.0 - 5.0 %    ABS. NEUTROPHILS 2.6 1.7 - 8.2 K/UL    ABS. LYMPHOCYTES 2.0 0.5 - 4.6 K/UL    ABS. MONOCYTES 0.4 0.1 - 1.3 K/UL    ABS. EOSINOPHILS 0.1 0.0 - 0.8 K/UL    ABS. BASOPHILS 0.1 0.0 - 0.2 K/UL    ABS. IMM. GRANS. 0.0 0.0 - 0.5 K/UL   METABOLIC PANEL, COMPREHENSIVE    Collection Time: 03/11/20 10:44 AM   Result Value Ref Range    Sodium 139 136 - 145 mmol/L    Potassium 3.9 3.5 - 5.1 mmol/L    Chloride 107 98 - 107 mmol/L    CO2 28 21 - 32 mmol/L    Anion gap 4 (L) 7 - 16 mmol/L    Glucose 98 65 - 100 mg/dL    BUN 10 6 - 23 MG/DL    Creatinine 0.59 (L) 0.6 - 1.0 MG/DL    GFR est AA >60 >60 ml/min/1.73m2    GFR est non-AA >60 >60 ml/min/1.73m2    Calcium 8.7 8.3 - 10.4 MG/DL    Bilirubin, total 0.6 0.2 - 1.1 MG/DL    ALT (SGPT) 24 12 - 65 U/L    AST (SGOT) 19 15 - 37 U/L    Alk.  phosphatase 52 50 - 136 U/L    Protein, total 7.7 6.3 - 8.2 g/dL    Albumin 3.9 3.5 - 5.0 g/dL    Globulin 3.8 (H) 2.3 - 3.5 g/dL    A-G Ratio 1.0 (L) 1.2 - 3.5     CK    Collection Time: 03/11/20 10:44 AM   Result Value Ref Range     (H) 21 - 215 U/L   TROPONIN I    Collection Time: 03/11/20 10:44 AM   Result Value Ref Range    Troponin-I, Qt. <0.02 (L) 0.02 - 0.05 NG/ML   TSH 3RD GENERATION    Collection Time: 03/11/20 10:44 AM   Result Value Ref Range    TSH 1.780 0.358 - 3.740 uIU/mL   LIPASE    Collection Time: 03/11/20 10:44 AM   Result Value Ref Range    Lipase 79 73 - 393 U/L   STREP AG SCREEN, GROUP A    Collection Time: 03/11/20 11:13 AM   Result Value Ref Range    Group A Strep Ag ID NEGATIVE  NEG     HCG URINE, QL. - POC    Collection Time: 03/11/20 11:45 AM   Result Value Ref Range    Pregnancy test,urine (POC) NEGATIVE  NEG

## 2020-03-11 NOTE — PROGRESS NOTES
Spoke with patient by phone. She is feeling worse today. I have nothing else to offer her here in the wellness clinic, she needs further workup in the ER. She will come here and I will take her to the ER now.

## 2020-03-12 LAB
BACTERIA SPEC CULT: NORMAL
SERVICE CMNT-IMP: NORMAL

## 2020-03-13 LAB
BACTERIA SPEC CULT: NORMAL
CK BB CFR SERPL ELPH: 0 %
CK MACRO1 CFR SERPL: 0 %
CK MACRO2 CFR SERPL: 0 %
CK MB CFR SERPL ELPH: 0 % (ref 0–3)
CK MM CFR SERPL ELPH: 100 % (ref 97–100)
CK SERPL-CCNC: 773 U/L (ref 24–173)
EBV DNA SPEC QL NAA+PROBE: NEGATIVE
SERVICE CMNT-IMP: NORMAL
SPECIMEN SOURCE: NORMAL

## 2020-03-29 ENCOUNTER — HOSPITAL ENCOUNTER (EMERGENCY)
Age: 20
Discharge: HOME | End: 2020-03-29
Attending: EMERGENCY MEDICINE
Payer: OTHER GOVERNMENT

## 2020-03-29 VITALS
OXYGEN SATURATION: 100 % | WEIGHT: 140 LBS | BODY MASS INDEX: 27.48 KG/M2 | DIASTOLIC BLOOD PRESSURE: 82 MMHG | TEMPERATURE: 98.1 F | HEART RATE: 80 BPM | HEIGHT: 60 IN | SYSTOLIC BLOOD PRESSURE: 129 MMHG | RESPIRATION RATE: 16 BRPM

## 2020-03-29 DIAGNOSIS — N94.6 DYSMENORRHEA: Primary | ICD-10-CM

## 2020-03-29 LAB
ANION GAP SERPL CALC-SCNC: 3 MMOL/L (ref 7–16)
BASOPHILS # BLD: 0.1 K/UL (ref 0–0.2)
BASOPHILS NFR BLD: 1 % (ref 0–2)
BUN SERPL-MCNC: 10 MG/DL (ref 6–23)
CALCIUM SERPL-MCNC: 8.7 MG/DL (ref 8.3–10.4)
CHLORIDE SERPL-SCNC: 107 MMOL/L (ref 98–107)
CO2 SERPL-SCNC: 28 MMOL/L (ref 21–32)
CREAT SERPL-MCNC: 0.61 MG/DL (ref 0.6–1)
DIFFERENTIAL METHOD BLD: NORMAL
EOSINOPHIL # BLD: 0.2 K/UL (ref 0–0.8)
EOSINOPHIL NFR BLD: 2 % (ref 0.5–7.8)
ERYTHROCYTE [DISTWIDTH] IN BLOOD BY AUTOMATED COUNT: 13.1 % (ref 11.9–14.6)
GLUCOSE SERPL-MCNC: 97 MG/DL (ref 65–100)
HCG UR QL: NEGATIVE
HCT VFR BLD AUTO: 38.6 % (ref 35.8–46.3)
HGB BLD-MCNC: 12.7 G/DL (ref 11.7–15.4)
IMM GRANULOCYTES # BLD AUTO: 0 K/UL (ref 0–0.5)
IMM GRANULOCYTES NFR BLD AUTO: 1 % (ref 0–5)
LYMPHOCYTES # BLD: 2.5 K/UL (ref 0.5–4.6)
LYMPHOCYTES NFR BLD: 30 % (ref 13–44)
MCH RBC QN AUTO: 28.9 PG (ref 26.1–32.9)
MCHC RBC AUTO-ENTMCNC: 32.9 G/DL (ref 31.4–35)
MCV RBC AUTO: 87.7 FL (ref 79.6–97.8)
MONOCYTES # BLD: 0.7 K/UL (ref 0.1–1.3)
MONOCYTES NFR BLD: 8 % (ref 4–12)
NEUTS SEG # BLD: 5 K/UL (ref 1.7–8.2)
NEUTS SEG NFR BLD: 59 % (ref 43–78)
NRBC # BLD: 0 K/UL (ref 0–0.2)
PLATELET # BLD AUTO: 295 K/UL (ref 150–450)
PMV BLD AUTO: 10.8 FL (ref 9.4–12.3)
POTASSIUM SERPL-SCNC: 3.6 MMOL/L (ref 3.5–5.1)
RBC # BLD AUTO: 4.4 M/UL (ref 4.05–5.2)
SODIUM SERPL-SCNC: 138 MMOL/L (ref 136–145)
WBC # BLD AUTO: 8.4 K/UL (ref 4.3–11.1)

## 2020-03-29 PROCEDURE — 81025 URINE PREGNANCY TEST: CPT

## 2020-03-29 PROCEDURE — 74011250636 HC RX REV CODE- 250/636: Performed by: PHYSICIAN ASSISTANT

## 2020-03-29 PROCEDURE — 81003 URINALYSIS AUTO W/O SCOPE: CPT

## 2020-03-29 PROCEDURE — 99284 EMERGENCY DEPT VISIT MOD MDM: CPT

## 2020-03-29 PROCEDURE — 96374 THER/PROPH/DIAG INJ IV PUSH: CPT

## 2020-03-29 PROCEDURE — 80048 BASIC METABOLIC PNL TOTAL CA: CPT

## 2020-03-29 PROCEDURE — 85025 COMPLETE CBC W/AUTO DIFF WBC: CPT

## 2020-03-29 RX ORDER — KETOROLAC TROMETHAMINE 30 MG/ML
30 INJECTION, SOLUTION INTRAMUSCULAR; INTRAVENOUS
Status: COMPLETED | OUTPATIENT
Start: 2020-03-29 | End: 2020-03-29

## 2020-03-29 RX ORDER — KETOROLAC TROMETHAMINE 10 MG/1
10 TABLET, FILM COATED ORAL 3 TIMES DAILY
Qty: 15 TAB | Refills: 0 | Status: SHIPPED | OUTPATIENT
Start: 2020-03-29 | End: 2020-04-03

## 2020-03-29 RX ORDER — SODIUM CHLORIDE 0.9 % (FLUSH) 0.9 %
5-40 SYRINGE (ML) INJECTION AS NEEDED
Status: DISCONTINUED | OUTPATIENT
Start: 2020-03-29 | End: 2020-03-29 | Stop reason: HOSPADM

## 2020-03-29 RX ADMIN — KETOROLAC TROMETHAMINE 30 MG: 30 INJECTION, SOLUTION INTRAMUSCULAR at 16:17

## 2020-03-29 RX ADMIN — SODIUM CHLORIDE 1000 ML: 900 INJECTION, SOLUTION INTRAVENOUS at 16:17

## 2020-03-29 NOTE — ED TRIAGE NOTES
Pt states she had the Donnal Loach placed two weeks ago and has been having pain in right lower quadrant. States the pain is severe today with nausea and dizziness today. States she has also had diarrhea the past two days. States she still has appendix and states she is bleeding heavy at this time.

## 2020-03-29 NOTE — DISCHARGE INSTRUCTIONS
Use meds as directed along with tylenol, heating pad and exercise, see your gyn office for recheck do not take motrin while taking toradol

## 2020-03-29 NOTE — ED PROVIDER NOTES
Here today complaining of lower abdominal pain worse today. She has a history of painful periods to the point where she had Mirena placed 2 weeks ago to try to lessen this. She started spotting 2 days ago and bleeding more heavily today she is used 3-4 pads today. She denies any fever she has had some nausea no vomiting she did complain of a couple loose stools. He states she had a old tramadol from a CT scan and painful episode back in the summer that did not help with her pain she denies  any difficulty urinating    The history is provided by the patient. Abdominal Pain    This is a recurrent problem. The current episode started 2 days ago. The problem occurs constantly. The problem has been gradually worsening. The pain is associated with an unknown factor. The pain is located in the RLQ and suprapubic region. The quality of the pain is aching. The pain is at a severity of 10/10. The pain is moderate. Associated symptoms include diarrhea and nausea. Pertinent negatives include no vomiting, no dysuria and no frequency. Nothing worsens the pain. The pain is relieved by nothing. Past workup includes CT scan, ultrasound. Her past medical history does not include ovarian cysts.         Past Medical History:   Diagnosis Date    Goiter     Hashimoto's thyroiditis     Vitamin D deficiency        Past Surgical History:   Procedure Laterality Date    HX ORTHOPAEDIC  age 1    Achilles tendon lengthening    HX TONSILLECTOMY           Family History:   Problem Relation Age of Onset    COPD Mother     Thyroid Disease Mother         hypothyroidism    Thyroid Disease Father         hyperthyroidism    Asthma Brother     Ovarian Cancer Maternal Grandmother     Dementia Paternal Grandmother     Thyroid Cancer Maternal Cousin        Social History     Socioeconomic History    Marital status:      Spouse name: Not on file    Number of children: Not on file    Years of education: Not on file   [place] Highest education level: Not on file   Occupational History    Not on file   Social Needs    Financial resource strain: Not on file    Food insecurity     Worry: Not on file     Inability: Not on file    Transportation needs     Medical: Not on file     Non-medical: Not on file   Tobacco Use    Smoking status: Never Smoker    Smokeless tobacco: Never Used   Substance and Sexual Activity    Alcohol use: Never     Frequency: Never    Drug use: Not Currently    Sexual activity: Not Currently     Partners: Male     Birth control/protection: Inserts     Comment: NuvaRing   Lifestyle    Physical activity     Days per week: Not on file     Minutes per session: Not on file    Stress: Not on file   Relationships    Social connections     Talks on phone: Not on file     Gets together: Not on file     Attends Buddhism service: Not on file     Active member of club or organization: Not on file     Attends meetings of clubs or organizations: Not on file     Relationship status: Not on file    Intimate partner violence     Fear of current or ex partner: Not on file     Emotionally abused: Not on file     Physically abused: Not on file     Forced sexual activity: Not on file   Other Topics Concern    Not on file   Social History Narrative    Not on file         ALLERGIES: Patient has no known allergies. Review of Systems   Gastrointestinal: Positive for abdominal pain, diarrhea and nausea. Negative for vomiting. Genitourinary: Negative for dysuria and frequency. All other systems reviewed and are negative. Vitals:    03/29/20 1559   BP: (!) 145/91   Pulse: 96   Resp: 16   Temp: 98.1 °F (36.7 °C)   SpO2: 99%   Weight: 63.5 kg (140 lb)   Height: 5' (1.524 m)            Physical Exam  Vitals signs and nursing note reviewed. Constitutional:       General: She is not in acute distress. Appearance: She is well-developed and normal weight. She is not diaphoretic. HENT:      Head: Normocephalic and atraumatic. Eyes:      Pupils: Pupils are equal, round, and reactive to light. Neck:      Musculoskeletal: Normal range of motion and neck supple. Cardiovascular:      Rate and Rhythm: Normal rate and regular rhythm. Pulmonary:      Effort: Pulmonary effort is normal.      Breath sounds: Normal breath sounds. No wheezing. Abdominal:      General: Bowel sounds are normal.      Palpations: Abdomen is soft. Tenderness: There is abdominal tenderness in the right lower quadrant and suprapubic area. There is no guarding or rebound. Hernia: No hernia is present. Genitourinary:     Uterus: Normal.       Adnexa: Right adnexa normal and left adnexa normal.      Comments: IUD string noted from the os no masses felt to the adnexa  Musculoskeletal: Normal range of motion. Skin:     General: Skin is warm. Neurological:      Mental Status: She is alert and oriented to person, place, and time. MDM  Number of Diagnoses or Management Options  Diagnosis management comments: To test negative urine dip negative except for blood  CBC and bmp are normal patient is afebrile, viewed chart in epic patient had normal pelvic ultrasound done in February just prior to scheduling IUD placement for dysmenorrhea, due to exam do not feel she needs repeat ultrasound today and no white count or fever feel risk of appendicitis is low.   Pain is again menstrual.  She was given 30 mg of Toradol IV the along with a liter of normal saline we will give her a prescription for Toradol pills  Pt discussed with Dr. London Carrel        Amount and/or Complexity of Data Reviewed  Clinical lab tests: reviewed and ordered  Review and summarize past medical records: yes  Discuss the patient with other providers: yes    Risk of Complications, Morbidity, and/or Mortality  Presenting problems: moderate  Diagnostic procedures: moderate  Management options: low    Patient Progress  Patient progress: improved         Procedures

## 2020-03-29 NOTE — ED NOTES
I have reviewed discharge instructions with the patient. The patient verbalized understanding. Patient left ED via Discharge Method: ambulatory to Home with parents. Opportunity for questions and clarification provided. Patient given 1 scripts. To continue your aftercare when you leave the hospital, you may receive an automated call from our care team to check in on how you are doing. This is a free service and part of our promise to provide the best care and service to meet your aftercare needs.  If you have questions, or wish to unsubscribe from this service please call [phone]. Thank you for Choosing our [facility] Emergency Department.

## 2020-04-20 ENCOUNTER — HOSPITAL ENCOUNTER (OUTPATIENT)
Dept: LAB | Age: 20
Discharge: HOME | End: 2020-04-20
Payer: OTHER GOVERNMENT

## 2020-04-20 DIAGNOSIS — E06.3 HASHIMOTO'S THYROIDITIS: ICD-10-CM

## 2020-04-20 DIAGNOSIS — E55.9 VITAMIN D DEFICIENCY: ICD-10-CM

## 2020-04-20 LAB
T4 FREE SERPL-MCNC: 0.8 NG/DL (ref 0.78–1.46)
TSH SERPL DL<=0.005 MIU/L-ACNC: 1.94 UIU/ML (ref 0.36–3.74)

## 2020-04-20 PROCEDURE — 84439 ASSAY OF FREE THYROXINE: CPT

## 2020-04-20 PROCEDURE — 36415 COLL VENOUS BLD VENIPUNCTURE: CPT

## 2020-04-20 PROCEDURE — 82306 VITAMIN D 25 HYDROXY: CPT

## 2020-04-20 PROCEDURE — 84443 ASSAY THYROID STIM HORMONE: CPT

## 2020-04-20 PROCEDURE — 86376 MICROSOMAL ANTIBODY EACH: CPT

## 2020-04-21 LAB
25(OH)D3+25(OH)D2 SERPL-MCNC: 26.2 NG/ML (ref 30–100)
THYROPEROXIDASE AB SERPL-ACNC: 143 IU/ML (ref 0–34)

## 2020-04-30 ENCOUNTER — APPOINTMENT (OUTPATIENT)
Dept: GENERAL RADIOLOGY | Age: 20
End: 2020-04-30
Attending: EMERGENCY MEDICINE
Payer: OTHER GOVERNMENT

## 2020-04-30 ENCOUNTER — HOSPITAL ENCOUNTER (EMERGENCY)
Age: 20
Discharge: HOME | End: 2020-04-30
Attending: EMERGENCY MEDICINE
Payer: OTHER GOVERNMENT

## 2020-04-30 VITALS
WEIGHT: 140 LBS | DIASTOLIC BLOOD PRESSURE: 73 MMHG | SYSTOLIC BLOOD PRESSURE: 111 MMHG | HEART RATE: 70 BPM | BODY MASS INDEX: 27.48 KG/M2 | OXYGEN SATURATION: 97 % | HEIGHT: 60 IN | TEMPERATURE: 98.1 F | RESPIRATION RATE: 18 BRPM

## 2020-04-30 DIAGNOSIS — R07.89 MUSCULOSKELETAL CHEST PAIN: Primary | ICD-10-CM

## 2020-04-30 LAB
ALBUMIN SERPL-MCNC: 4.8 G/DL (ref 3.5–5)
ALBUMIN/GLOB SERPL: 1.2 {RATIO} (ref 1.2–3.5)
ALP SERPL-CCNC: 64 U/L (ref 50–136)
ALT SERPL-CCNC: 14 U/L (ref 12–65)
ANION GAP SERPL CALC-SCNC: 8 MMOL/L (ref 7–16)
AST SERPL-CCNC: 15 U/L (ref 15–37)
BASOPHILS # BLD: 0.1 K/UL (ref 0–0.2)
BASOPHILS NFR BLD: 1 % (ref 0–2)
BILIRUB SERPL-MCNC: 1 MG/DL (ref 0.2–1.1)
BUN SERPL-MCNC: 13 MG/DL (ref 6–23)
CALCIUM SERPL-MCNC: 9.8 MG/DL (ref 8.3–10.4)
CHLORIDE SERPL-SCNC: 105 MMOL/L (ref 98–107)
CO2 SERPL-SCNC: 25 MMOL/L (ref 21–32)
CREAT SERPL-MCNC: 0.7 MG/DL (ref 0.6–1)
CRP SERPL-MCNC: 0.5 MG/DL (ref 0–0.9)
D DIMER PPP FEU-MCNC: 0.28 UG/ML(FEU)
DIFFERENTIAL METHOD BLD: NORMAL
EOSINOPHIL # BLD: 0.1 K/UL (ref 0–0.8)
EOSINOPHIL NFR BLD: 2 % (ref 0.5–7.8)
ERYTHROCYTE [DISTWIDTH] IN BLOOD BY AUTOMATED COUNT: 12.6 % (ref 11.9–14.6)
GLOBULIN SER CALC-MCNC: 3.9 G/DL (ref 2.3–3.5)
GLUCOSE SERPL-MCNC: 84 MG/DL (ref 65–100)
HCT VFR BLD AUTO: 41.1 % (ref 35.8–46.3)
HGB BLD-MCNC: 13.6 G/DL (ref 11.7–15.4)
IMM GRANULOCYTES # BLD AUTO: 0 K/UL (ref 0–0.5)
IMM GRANULOCYTES NFR BLD AUTO: 0 % (ref 0–5)
LYMPHOCYTES # BLD: 2 K/UL (ref 0.5–4.6)
LYMPHOCYTES NFR BLD: 31 % (ref 13–44)
MCH RBC QN AUTO: 29 PG (ref 26.1–32.9)
MCHC RBC AUTO-ENTMCNC: 33.1 G/DL (ref 31.4–35)
MCV RBC AUTO: 87.6 FL (ref 79.6–97.8)
MONOCYTES # BLD: 0.6 K/UL (ref 0.1–1.3)
MONOCYTES NFR BLD: 8 % (ref 4–12)
NEUTS SEG # BLD: 3.8 K/UL (ref 1.7–8.2)
NEUTS SEG NFR BLD: 58 % (ref 43–78)
NRBC # BLD: 0 K/UL (ref 0–0.2)
PLATELET # BLD AUTO: 308 K/UL (ref 150–450)
PMV BLD AUTO: 11 FL (ref 9.4–12.3)
POTASSIUM SERPL-SCNC: 3.9 MMOL/L (ref 3.5–5.1)
PROT SERPL-MCNC: 8.7 G/DL (ref 6.3–8.2)
RBC # BLD AUTO: 4.69 M/UL (ref 4.05–5.2)
SODIUM SERPL-SCNC: 138 MMOL/L (ref 136–145)
TROPONIN I SERPL-MCNC: <0.02 NG/ML (ref 0.02–0.05)
WBC # BLD AUTO: 6.5 K/UL (ref 4.3–11.1)

## 2020-04-30 PROCEDURE — 74011250636 HC RX REV CODE- 250/636: Performed by: EMERGENCY MEDICINE

## 2020-04-30 PROCEDURE — 86140 C-REACTIVE PROTEIN: CPT

## 2020-04-30 PROCEDURE — 71045 X-RAY EXAM CHEST 1 VIEW: CPT

## 2020-04-30 PROCEDURE — 85025 COMPLETE CBC W/AUTO DIFF WBC: CPT

## 2020-04-30 PROCEDURE — 85379 FIBRIN DEGRADATION QUANT: CPT

## 2020-04-30 PROCEDURE — 96374 THER/PROPH/DIAG INJ IV PUSH: CPT

## 2020-04-30 PROCEDURE — 80053 COMPREHEN METABOLIC PANEL: CPT

## 2020-04-30 PROCEDURE — 84484 ASSAY OF TROPONIN QUANT: CPT

## 2020-04-30 PROCEDURE — 99284 EMERGENCY DEPT VISIT MOD MDM: CPT

## 2020-04-30 PROCEDURE — 93005 ELECTROCARDIOGRAM TRACING: CPT | Performed by: EMERGENCY MEDICINE

## 2020-04-30 PROCEDURE — 36415 COLL VENOUS BLD VENIPUNCTURE: CPT

## 2020-04-30 RX ORDER — KETOROLAC TROMETHAMINE 30 MG/ML
30 INJECTION, SOLUTION INTRAMUSCULAR; INTRAVENOUS
Status: COMPLETED | OUTPATIENT
Start: 2020-04-30 | End: 2020-04-30

## 2020-04-30 RX ADMIN — KETOROLAC TROMETHAMINE 30 MG: 30 INJECTION, SOLUTION INTRAMUSCULAR; INTRAVENOUS at 14:39

## 2020-04-30 NOTE — ED NOTES
I have reviewed discharge instructions with the patient. The patient verbalized understanding. Patient left ED via Discharge Method: ambulatory to her department with her supervisor  Opportunity for questions and clarification provided. Patient given 0 scripts. To continue your aftercare when you leave the hospital, you may receive an automated call from our care team to check in on how you are doing. This is a free service and part of our promise to provide the best care and service to meet your aftercare needs.  If you have questions, or wish to unsubscribe from this service please call [phone]. Thank you for Choosing our [address] [place] [address] Emergency Department.

## 2020-04-30 NOTE — ED TRIAGE NOTES
Rapid response called to 7th floor. Pt is a  drawing blood when she started to have chest pain. States she felt like her right arm was weak and tingling. Rapid response team reports sinus tach at 160. /134.  Pt denies nausea

## 2020-04-30 NOTE — DISCHARGE INSTRUCTIONS

## 2020-04-30 NOTE — ED PROVIDER NOTES
Patient presents from work with complaints of right-sided chest pain. She reports the pain is sharp in nature and is been present constantly since about 11 AM.  She states that she is also had some tingling in the left arm. The pain is pleuritic in nature and seems to radiate into the back. She denies prior occurrence. She does report having an argument with her mother-in-law last night. She has no history of cardiac or lung disease. She does use Mirena for birth control. The history is provided by the patient. Chest Pain (Angina)    This is a new problem. The current episode started 3 to 5 hours ago. The problem has not changed since onset. Duration of episode(s) is 3 hours. The problem occurs constantly. The pain is associated with normal activity. The pain is present in the right side. The pain is moderate. The quality of the pain is described as sharp. The pain radiates to the upper back. The symptoms are aggravated by deep breathing. Associated symptoms include numbness. Pertinent negatives include no back pain, no claudication, no cough, no diaphoresis, no dizziness, no exertional chest pressure, no fever, no headaches, no hemoptysis, no irregular heartbeat, no leg pain, no lower extremity edema, no malaise/fatigue, no nausea, no near-syncope, no orthopnea, no palpitations, no PND, no shortness of breath, no sputum production, no vomiting and no weakness. She has tried nothing for the symptoms. The treatment provided no relief. Risk factors: contraceptive use.         Past Medical History:   Diagnosis Date    Goiter     Hashimoto's thyroiditis     Vitamin D deficiency        Past Surgical History:   Procedure Laterality Date    HX GYN  03/17/2020    IUD placement    HX ORTHOPAEDIC  age 1    Achilles tendon lengthening    HX TONSILLECTOMY           Family History:   Problem Relation Age of Onset    COPD Mother     Thyroid Disease Mother         hypothyroidism    Thyroid Disease Father hyperthyroidism    Asthma Brother     Ovarian Cancer Maternal Grandmother     Dementia Paternal Grandmother     Thyroid Cancer Maternal Cousin        Social History     Socioeconomic History    Marital status:      Spouse name: Not on file    Number of children: Not on file    Years of education: Not on file    Highest education level: Not on file   Occupational History    Not on file   Social Needs    Financial resource strain: Not on file    Food insecurity     Worry: Not on file     Inability: Not on file   [employer] needs     Medical: Not on file     Non-medical: Not on file   Tobacco Use    Smoking status: Never Smoker    Smokeless tobacco: Never Used   Substance and Sexual Activity    Alcohol use: Never     Frequency: Never    Drug use: Not Currently    Sexual activity: Yes     Partners: Male     Birth control/protection: I.U.D. Comment: NuvaRing   Lifestyle    Physical activity     Days per week: Not on file     Minutes per session: Not on file    Stress: Not on file   Relationships    Social connections     Talks on phone: Not on file     Gets together: Not on file     Attends Adventism service: Not on file     Active member of club or organization: Not on file     Attends meetings of clubs or organizations: Not on file     Relationship status: Not on file    Intimate partner violence     Fear of current or ex partner: Not on file     Emotionally abused: Not on file     Physically abused: Not on file     Forced sexual activity: Not on file   Other Topics Concern    Not on file   Social History Narrative    Not on file         ALLERGIES: Patient has no known allergies. Review of Systems   Constitutional: Negative for diaphoresis, fever and malaise/fatigue. Respiratory: Negative for cough, hemoptysis, sputum production and shortness of breath. Cardiovascular: Positive for chest pain. Negative for palpitations, orthopnea, claudication, PND and near-syncope. Gastrointestinal: Negative for nausea and vomiting. Musculoskeletal: Negative for back pain. Neurological: Positive for numbness. Negative for dizziness, weakness and headaches. All other systems reviewed and are negative. Vitals:    04/30/20 1406   BP: 132/78   Pulse: 98   Resp: 13   Temp: 98 °F (36.7 °C)   SpO2: 99%   Weight: 63.5 kg (140 lb)   Height: 5' (1.524 m)            Physical Exam  Vitals signs and nursing note reviewed. Constitutional:       General: She is not in acute distress. Appearance: Normal appearance. She is normal weight. She is not ill-appearing, toxic-appearing or diaphoretic. HENT:      Head: Normocephalic and atraumatic. Mouth/Throat:      Pharynx: Oropharynx is clear. Eyes:      Extraocular Movements: Extraocular movements intact. Conjunctiva/sclera: Conjunctivae normal.      Pupils: Pupils are equal, round, and reactive to light. Neck:      Musculoskeletal: Normal range of motion and neck supple. Cardiovascular:      Rate and Rhythm: Normal rate and regular rhythm. Pulses: Normal pulses. Heart sounds: Normal heart sounds. Pulmonary:      Effort: Pulmonary effort is normal.      Breath sounds: Normal breath sounds. Abdominal:      General: Abdomen is flat. Bowel sounds are normal.      Palpations: Abdomen is soft. Musculoskeletal: Normal range of motion. Skin:     General: Skin is warm and dry. Capillary Refill: Capillary refill takes less than 2 seconds. Neurological:      General: No focal deficit present. Mental Status: She is alert and oriented to person, place, and time. Mental status is at baseline.    Psychiatric:         Mood and Affect: Mood normal.         Behavior: Behavior normal.          MDM  Number of Diagnoses or Management Options     Amount and/or Complexity of Data Reviewed  Clinical lab tests: ordered and reviewed  Tests in the radiology section of CPT®: ordered and reviewed  Independent visualization of images, tracings, or specimens: yes (EKG at 1405: Is normal rhythm, rate of 78, no acute ischemic changes.)    Risk of Complications, Morbidity, and/or Mortality  Presenting problems: moderate  Diagnostic procedures: moderate  Management options: moderate    Patient Progress  Patient progress: stable         Procedures

## 2020-04-30 NOTE — PROGRESS NOTES
Responded to Rapid Response called overhead for Ms. Moreno at room 708. Provided spiritual support from [facility] as staff provided patient care. Followed up with Ms. Michelle Babin in the ER and conveyed care and concern. Abbreviated visit to allow staff to offer care.  follow-up is planned as patient is available.      Patti Casanova 66  Board Certified

## 2020-05-01 LAB
ATRIAL RATE: 78 BPM
CALCULATED P AXIS, ECG09: 81 DEGREES
CALCULATED R AXIS, ECG10: 66 DEGREES
CALCULATED T AXIS, ECG11: 70 DEGREES
DIAGNOSIS, 93000: NORMAL
P-R INTERVAL, ECG05: 154 MS
Q-T INTERVAL, ECG07: 334 MS
QRS DURATION, ECG06: 68 MS
QTC CALCULATION (BEZET), ECG08: 380 MS
VENTRICULAR RATE, ECG03: 78 BPM

## 2020-06-26 PROBLEM — R00.2 PALPITATIONS: Status: ACTIVE | Noted: 2020-06-26

## 2020-06-26 PROBLEM — R07.2 PRECORDIAL PAIN: Status: ACTIVE | Noted: 2020-06-26

## 2020-07-03 ENCOUNTER — PATIENT OUTREACH (OUTPATIENT)
Dept: CASE MANAGEMENT | Age: 20
End: 2020-07-03

## 2020-07-03 NOTE — PROGRESS NOTES
Date/Time:  7/3/2020 10:17 AM  RN attempted to reach patient by telephone regarding yellow Loop alert. Left HIPPA compliant message requesting a return call. Will attempt to reach patient again. Comment left in Loop monitoring program with contact information.

## 2021-03-17 ENCOUNTER — HOSPITAL ENCOUNTER (EMERGENCY)
Age: 21
Discharge: HOME | End: 2021-03-17
Attending: EMERGENCY MEDICINE
Payer: OTHER GOVERNMENT

## 2021-03-17 ENCOUNTER — APPOINTMENT (OUTPATIENT)
Dept: GENERAL RADIOLOGY | Age: 21
End: 2021-03-17
Attending: EMERGENCY MEDICINE
Payer: OTHER GOVERNMENT

## 2021-03-17 VITALS
RESPIRATION RATE: 18 BRPM | BODY MASS INDEX: 22.58 KG/M2 | DIASTOLIC BLOOD PRESSURE: 56 MMHG | HEIGHT: 60 IN | HEART RATE: 83 BPM | WEIGHT: 115 LBS | SYSTOLIC BLOOD PRESSURE: 113 MMHG | TEMPERATURE: 98.6 F | OXYGEN SATURATION: 98 %

## 2021-03-17 DIAGNOSIS — M54.40 BILATERAL LOW BACK PAIN WITH SCIATICA, SCIATICA LATERALITY UNSPECIFIED, UNSPECIFIED CHRONICITY: Primary | ICD-10-CM

## 2021-03-17 LAB
ALBUMIN SERPL-MCNC: 4.2 G/DL (ref 3.5–5)
ALBUMIN/GLOB SERPL: 1.3 {RATIO} (ref 1.2–3.5)
ALP SERPL-CCNC: 43 U/L (ref 50–136)
ALT SERPL-CCNC: 14 U/L (ref 12–65)
ANION GAP SERPL CALC-SCNC: 6 MMOL/L (ref 7–16)
AST SERPL-CCNC: 14 U/L (ref 15–37)
BASOPHILS # BLD: 0.1 K/UL (ref 0–0.2)
BASOPHILS NFR BLD: 1 % (ref 0–2)
BILIRUB SERPL-MCNC: 0.4 MG/DL (ref 0.2–1.1)
BUN SERPL-MCNC: 9 MG/DL (ref 6–23)
CALCIUM SERPL-MCNC: 8.9 MG/DL (ref 8.3–10.4)
CHLORIDE SERPL-SCNC: 105 MMOL/L (ref 98–107)
CO2 SERPL-SCNC: 27 MMOL/L (ref 21–32)
CREAT SERPL-MCNC: 0.55 MG/DL (ref 0.6–1)
DIFFERENTIAL METHOD BLD: NORMAL
EOSINOPHIL # BLD: 0.1 K/UL (ref 0–0.8)
EOSINOPHIL NFR BLD: 1 % (ref 0.5–7.8)
ERYTHROCYTE [DISTWIDTH] IN BLOOD BY AUTOMATED COUNT: 12.6 % (ref 11.9–14.6)
GLOBULIN SER CALC-MCNC: 3.3 G/DL (ref 2.3–3.5)
GLUCOSE SERPL-MCNC: 88 MG/DL (ref 65–100)
HCG UR QL: NEGATIVE
HCT VFR BLD AUTO: 37.6 % (ref 35.8–46.3)
HGB BLD-MCNC: 12.5 G/DL (ref 11.7–15.4)
IMM GRANULOCYTES # BLD AUTO: 0 K/UL (ref 0–0.5)
IMM GRANULOCYTES NFR BLD AUTO: 0 % (ref 0–5)
LYMPHOCYTES # BLD: 3.2 K/UL (ref 0.5–4.6)
LYMPHOCYTES NFR BLD: 37 % (ref 13–44)
MCH RBC QN AUTO: 28.7 PG (ref 26.1–32.9)
MCHC RBC AUTO-ENTMCNC: 33.2 G/DL (ref 31.4–35)
MCV RBC AUTO: 86.4 FL (ref 79.6–97.8)
MONOCYTES # BLD: 0.9 K/UL (ref 0.1–1.3)
MONOCYTES NFR BLD: 10 % (ref 4–12)
NEUTS SEG # BLD: 4.4 K/UL (ref 1.7–8.2)
NEUTS SEG NFR BLD: 51 % (ref 43–78)
NRBC # BLD: 0 K/UL (ref 0–0.2)
PLATELET # BLD AUTO: 300 K/UL (ref 150–450)
PLATELET COMMENTS,PCOM: ADEQUATE
PMV BLD AUTO: 10.7 FL (ref 9.4–12.3)
POTASSIUM SERPL-SCNC: 3.8 MMOL/L (ref 3.5–5.1)
PROT SERPL-MCNC: 7.5 G/DL (ref 6.3–8.2)
RBC # BLD AUTO: 4.35 M/UL (ref 4.05–5.2)
RBC MORPH BLD: NORMAL
SODIUM SERPL-SCNC: 138 MMOL/L (ref 136–145)
WBC # BLD AUTO: 8.7 K/UL (ref 4.3–11.1)
WBC MORPH BLD: NORMAL

## 2021-03-17 PROCEDURE — 74011250636 HC RX REV CODE- 250/636: Performed by: EMERGENCY MEDICINE

## 2021-03-17 PROCEDURE — 80053 COMPREHEN METABOLIC PANEL: CPT

## 2021-03-17 PROCEDURE — 96374 THER/PROPH/DIAG INJ IV PUSH: CPT

## 2021-03-17 PROCEDURE — 72100 X-RAY EXAM L-S SPINE 2/3 VWS: CPT

## 2021-03-17 PROCEDURE — 85025 COMPLETE CBC W/AUTO DIFF WBC: CPT

## 2021-03-17 PROCEDURE — 81025 URINE PREGNANCY TEST: CPT

## 2021-03-17 PROCEDURE — 81003 URINALYSIS AUTO W/O SCOPE: CPT

## 2021-03-17 PROCEDURE — 96375 TX/PRO/DX INJ NEW DRUG ADDON: CPT

## 2021-03-17 PROCEDURE — 99284 EMERGENCY DEPT VISIT MOD MDM: CPT

## 2021-03-17 RX ORDER — KETOROLAC TROMETHAMINE 30 MG/ML
15 INJECTION, SOLUTION INTRAMUSCULAR; INTRAVENOUS
Status: COMPLETED | OUTPATIENT
Start: 2021-03-17 | End: 2021-03-17

## 2021-03-17 RX ORDER — METHOCARBAMOL 500 MG/1
500 TABLET, FILM COATED ORAL 3 TIMES DAILY
Qty: 12 TAB | Refills: 0 | Status: SHIPPED | OUTPATIENT
Start: 2021-03-17 | End: 2021-03-21

## 2021-03-17 RX ORDER — ONDANSETRON 2 MG/ML
4 INJECTION INTRAMUSCULAR; INTRAVENOUS
Status: COMPLETED | OUTPATIENT
Start: 2021-03-17 | End: 2021-03-17

## 2021-03-17 RX ADMIN — KETOROLAC TROMETHAMINE 15 MG: 30 INJECTION, SOLUTION INTRAMUSCULAR at 02:05

## 2021-03-17 RX ADMIN — ONDANSETRON 4 MG: 2 INJECTION INTRAMUSCULAR; INTRAVENOUS at 02:05

## 2021-03-17 NOTE — DISCHARGE INSTRUCTIONS
Take Robaxin as directed. Schedule close follow-up with primary care physician. Return to ED if symptoms worsen or progress in any way.

## 2021-03-17 NOTE — ED PROVIDER NOTES
18-year-old female presents with complaint of lower back pain that radiates down bilateral lower extremities. Denies numbness, tingling, weakness, difficulty walking, bowel or bladder incontinence, nausea, vomiting, diarrhea, constipation. Patient reports occasional burning with urination. Denies pelvic pain, vaginal bleeding, vaginal discharge. Denies any recent trauma or injury. The history is provided by the patient. No  was used. Back Pain   This is a new problem. The current episode started more than 2 days ago. The problem has not changed since onset. The problem occurs constantly. Patient reports not work related injury. The pain is associated with no known injury. The pain is present in the lumbar spine. The pain is at a severity of 2/10. The pain is mild. Associated symptoms include dysuria. Pertinent negatives include no chest pain, no fever, no numbness, no weight loss, no headaches, no abdominal pain, no abdominal swelling, no bowel incontinence, no perianal numbness, no pelvic pain, no paresthesias, no paresis, no tingling and no weakness. She has tried nothing for the symptoms. The treatment provided no relief.         Past Medical History:   Diagnosis Date    Goiter     Hashimoto's thyroiditis     Vitamin D deficiency        Past Surgical History:   Procedure Laterality Date    HX GYN  03/17/2020    IUD placement    HX ORTHOPAEDIC  age 1    Achilles tendon lengthening    HX TONSILLECTOMY           Family History:   Problem Relation Age of Onset    COPD Mother     Thyroid Disease Mother         hypothyroidism    Thyroid Disease Father         hyperthyroidism    Asthma Brother     Ovarian Cancer Maternal Grandmother     Dementia Paternal Grandmother     Thyroid Cancer Maternal Cousin        Social History     Socioeconomic History    Marital status:      Spouse name: Not on file    Number of children: Not on file    Years of education: Not on file   [facility] Highest education level: Not on file   Occupational History   • Not on file   Social Needs   • Financial resource strain: Not on file   • Food insecurity     Worry: Not on file     Inability: Not on file   • Transportation needs     Medical: Not on file     Non-medical: Not on file   Tobacco Use   • Smoking status: Never Smoker   • Smokeless tobacco: Never Used   Substance and Sexual Activity   • Alcohol use: Never     Frequency: Never   • Drug use: Not Currently   • Sexual activity: Yes     Partners: Male     Birth control/protection: I.U.D.     Comment: NuvaRing   Lifestyle   • Physical activity     Days per week: Not on file     Minutes per session: Not on file   • Stress: Not on file   Relationships   • Social connections     Talks on phone: Not on file     Gets together: Not on file     Attends Religion service: Not on file     Active member of club or organization: Not on file     Attends meetings of clubs or organizations: Not on file     Relationship status: Not on file   • Intimate partner violence     Fear of current or ex partner: Not on file     Emotionally abused: Not on file     Physically abused: Not on file     Forced sexual activity: Not on file   Other Topics Concern   • Not on file   Social History Narrative   • Not on file         ALLERGIES: Patient has no known allergies.    Review of Systems   Constitutional: Negative for chills, diaphoresis, fatigue, fever and weight loss.   HENT: Negative for congestion, rhinorrhea and sore throat.    Respiratory: Negative for cough and shortness of breath.    Cardiovascular: Negative for chest pain.   Gastrointestinal: Negative for abdominal pain, anal bleeding, blood in stool, bowel incontinence, constipation, diarrhea, nausea and vomiting.   Genitourinary: Positive for dysuria. Negative for decreased urine volume, flank pain, hematuria, pelvic pain, vaginal bleeding and vaginal discharge.   Musculoskeletal: Positive for back pain. Negative for  arthralgias, gait problem, myalgias, neck pain and neck stiffness. Skin: Negative for rash and wound. Neurological: Negative for dizziness, tingling, syncope, weakness, light-headedness, numbness, headaches and paresthesias. Vitals:    03/17/21 0053   BP: (!) 143/92   Pulse: (!) 112   Resp: 18   Temp: 98.6 °F (37 °C)   SpO2: 99%   Weight: 52.2 kg (115 lb)   Height: 5' (1.524 m)            Physical Exam  Vitals signs and nursing note reviewed. Constitutional:       Appearance: Normal appearance. HENT:      Head: Normocephalic. Nose: Nose normal.      Mouth/Throat:      Mouth: Mucous membranes are moist.   Eyes:      Extraocular Movements: Extraocular movements intact. Pupils: Pupils are equal, round, and reactive to light. Neck:      Musculoskeletal: Normal range of motion. No neck rigidity. Cardiovascular:      Rate and Rhythm: Normal rate. Pulses: Normal pulses. Heart sounds: Normal heart sounds. Pulmonary:      Effort: Pulmonary effort is normal.      Breath sounds: Normal breath sounds. Comments: CTAB. Abdominal:      General: Bowel sounds are normal.      Palpations: Abdomen is soft. Tenderness: There is no abdominal tenderness. There is no guarding or rebound. Comments: Soft, NTND. No peritoneal signs. No CVAT. No RLQ, LLQ, suprapubic TTP. Musculoskeletal: Normal range of motion. General: No swelling or tenderness. Comments: No midline T-spine or L-spine tenderness to palpation. No step-off. Negative straight leg raise test bilaterally. Full range of motion of bilateral lower extremities. FROM of all extremities. Skin:     Findings: No erythema or rash. Neurological:      General: No focal deficit present. Mental Status: She is alert and oriented to person, place, and time. Cranial Nerves: No cranial nerve deficit. Motor: No weakness. Deep Tendon Reflexes: Reflexes normal.      Comments: Strength 5/5 throughout. Normal sensory exam. No saddle anesthesia. MDM  Number of Diagnoses or Management Options  Bilateral low back pain with sciatica, sciatica laterality unspecified, unspecified chronicity: new and requires workup  Diagnosis management comments: Repeat vital signs stable. Patient not tachycardic. Afebrile. Labs unremarkable. UPT negative. UA negative for UTI. X-ray L-spine with no acute or concerning findings. Normal neuro exam.  Normal gait. No saddle anesthesia. Abdomen soft, nontender with no rebound or guarding. No indication for further imaging this time. Patient instructed on need for close follow-up with PCP. Patient given strict return precautions. Amount and/or Complexity of Data Reviewed  Clinical lab tests: ordered and reviewed  Tests in the radiology section of CPT®: ordered and reviewed  Tests in the medicine section of CPT®: ordered and reviewed  Review and summarize past medical records: yes  Independent visualization of images, tracings, or specimens: yes    Risk of Complications, Morbidity, and/or Mortality  Presenting problems: moderate  Diagnostic procedures: low  Management options: moderate    Patient Progress  Patient progress: stable    ED Course as of Mar 17 0147   Wed Mar 17, 2021   0146 UPT negative.   Urine dipstick negative for UTI.    [DF]      ED Course User Index  [DF] Ruth Cordero MD       Procedures    Results Include:    Recent Results (from the past 24 hour(s))   HCG URINE, QL. - POC    Collection Time: 03/17/21  1:07 AM   Result Value Ref Range    Pregnancy test,urine (POC) Negative NEG     CBC WITH AUTOMATED DIFF    Collection Time: 03/17/21  1:11 AM   Result Value Ref Range    WBC 8.7 4.3 - 11.1 K/uL    RBC 4.35 4.05 - 5.2 M/uL    HGB 12.5 11.7 - 15.4 g/dL    HCT 37.6 35.8 - 46.3 %    MCV 86.4 79.6 - 97.8 FL    MCH 28.7 26.1 - 32.9 PG    MCHC 33.2 31.4 - 35.0 g/dL    RDW 12.6 11.9 - 14.6 %    PLATELET 931 224 - 439 K/uL    MPV 10.7 9.4 - 12.3 FL    ABSOLUTE NRBC 0.00 0.0 - 0.2 K/uL    NEUTROPHILS 51 43 - 78 %    LYMPHOCYTES 37 13 - 44 %    MONOCYTES 10 4.0 - 12.0 %    EOSINOPHILS 1 0.5 - 7.8 %    BASOPHILS 1 0.0 - 2.0 %    IMMATURE GRANULOCYTES 0 0.0 - 5.0 %    ABS. NEUTROPHILS 4.4 1.7 - 8.2 K/UL    ABS. LYMPHOCYTES 3.2 0.5 - 4.6 K/UL    ABS. MONOCYTES 0.9 0.1 - 1.3 K/UL    ABS. EOSINOPHILS 0.1 0.0 - 0.8 K/UL    ABS. BASOPHILS 0.1 0.0 - 0.2 K/UL    ABS. IMM. GRANS. 0.0 0.0 - 0.5 K/UL    RBC COMMENTS NORMOCYTIC/NORMOCHROMIC      WBC COMMENTS Result Confirmed By Smear      PLATELET COMMENTS ADEQUATE      DF AUTOMATED     METABOLIC PANEL, COMPREHENSIVE    Collection Time: 03/17/21  1:11 AM   Result Value Ref Range    Sodium 138 136 - 145 mmol/L    Potassium 3.8 3.5 - 5.1 mmol/L    Chloride 105 98 - 107 mmol/L    CO2 27 21 - 32 mmol/L    Anion gap 6 (L) 7 - 16 mmol/L    Glucose 88 65 - 100 mg/dL    BUN 9 6 - 23 MG/DL    Creatinine 0.55 (L) 0.6 - 1.0 MG/DL    GFR est AA >60 >60 ml/min/1.73m2    GFR est non-AA >60 >60 ml/min/1.73m2    Calcium 8.9 8.3 - 10.4 MG/DL    Bilirubin, total 0.4 0.2 - 1.1 MG/DL    ALT (SGPT) 14 12 - 65 U/L    AST (SGOT) 14 (L) 15 - 37 U/L    Alk. phosphatase 43 (L) 50 - 136 U/L    Protein, total 7.5 6.3 - 8.2 g/dL    Albumin 4.2 3.5 - 5.0 g/dL    Globulin 3.3 2.3 - 3.5 g/dL    A-G Ratio 1.3 1.2 - 3.5                  Umberto Stevenson MD; 3/17/2021 @1:06 AM Voice dictation software was used during the making of this note. This software is not perfect and grammatical and other typographical errors may be present.   This note has not been proofread for errors.  ===================================================================

## 2021-03-17 NOTE — ED NOTES
I have reviewed discharge instructions with the patient. The patient verbalized understanding. Patient left ED via Discharge Method: ambulatory to Home with friend. Opportunity for questions and clarification provided. Patient given 1 scripts. To continue your aftercare when you leave the hospital, you may receive an automated call from our care team to check in on how you are doing. This is a free service and part of our promise to provide the best care and service to meet your aftercare needs.  If you have questions, or wish to unsubscribe from this service please call [phone]. Thank you for Choosing our [facility].

## 2021-03-17 NOTE — ED TRIAGE NOTES
Patient presents of lower back, groin pain and pressure when she urinates.  Patient masked on arrival.

## 2021-06-15 ENCOUNTER — HOSPITAL ENCOUNTER (OUTPATIENT)
Dept: LAB | Age: 21
Discharge: HOME | End: 2021-06-15
Payer: OTHER GOVERNMENT

## 2021-06-15 LAB
ALBUMIN SERPL-MCNC: 4.7 G/DL (ref 3.5–5)
ALBUMIN/GLOB SERPL: 1.3 {RATIO} (ref 1.2–3.5)
ALP SERPL-CCNC: 54 U/L (ref 50–136)
ALT SERPL-CCNC: 15 U/L (ref 12–65)
ANION GAP SERPL CALC-SCNC: 4 MMOL/L (ref 7–16)
AST SERPL-CCNC: 15 U/L (ref 15–37)
BASOPHILS # BLD: 0 K/UL (ref 0–0.2)
BASOPHILS NFR BLD: 0 % (ref 0–2)
BILIRUB SERPL-MCNC: 0.5 MG/DL (ref 0.2–1.1)
BUN SERPL-MCNC: 6 MG/DL (ref 6–23)
CALCIUM SERPL-MCNC: 8.9 MG/DL (ref 8.3–10.4)
CHLORIDE SERPL-SCNC: 102 MMOL/L (ref 98–107)
CO2 SERPL-SCNC: 31 MMOL/L (ref 21–32)
CREAT SERPL-MCNC: 0.7 MG/DL (ref 0.6–1)
DIFFERENTIAL METHOD BLD: ABNORMAL
EOSINOPHIL # BLD: 0 K/UL (ref 0–0.8)
EOSINOPHIL NFR BLD: 0 % (ref 0.5–7.8)
ERYTHROCYTE [DISTWIDTH] IN BLOOD BY AUTOMATED COUNT: 13.2 % (ref 11.9–14.6)
EST. AVERAGE GLUCOSE BLD GHB EST-MCNC: 97 MG/DL
GLOBULIN SER CALC-MCNC: 3.7 G/DL (ref 2.3–3.5)
GLUCOSE SERPL-MCNC: 63 MG/DL (ref 65–100)
HBA1C MFR BLD: 5 % (ref 4.2–6.3)
HCT VFR BLD AUTO: 41.8 % (ref 35.8–46.3)
HGB BLD-MCNC: 13.8 G/DL (ref 11.7–15.4)
IMM GRANULOCYTES # BLD AUTO: 0 K/UL (ref 0–0.5)
IMM GRANULOCYTES NFR BLD AUTO: 0 % (ref 0–5)
LYMPHOCYTES # BLD: 2.7 K/UL (ref 0.5–4.6)
LYMPHOCYTES NFR BLD: 35 % (ref 13–44)
MCH RBC QN AUTO: 28.8 PG (ref 26.1–32.9)
MCHC RBC AUTO-ENTMCNC: 33 G/DL (ref 31.4–35)
MCV RBC AUTO: 87.3 FL (ref 79.6–97.8)
MONOCYTES # BLD: 0.5 K/UL (ref 0.1–1.3)
MONOCYTES NFR BLD: 7 % (ref 4–12)
NEUTS SEG # BLD: 4.5 K/UL (ref 1.7–8.2)
NEUTS SEG NFR BLD: 58 % (ref 43–78)
NRBC # BLD: 0 K/UL (ref 0–0.2)
PLATELET # BLD AUTO: 344 K/UL (ref 150–450)
PMV BLD AUTO: 10.1 FL (ref 9.4–12.3)
POTASSIUM SERPL-SCNC: 3.9 MMOL/L (ref 3.5–5.1)
PROT SERPL-MCNC: 8.4 G/DL (ref 6.3–8.2)
RBC # BLD AUTO: 4.79 M/UL (ref 4.05–5.2)
SODIUM SERPL-SCNC: 137 MMOL/L (ref 136–145)
WBC # BLD AUTO: 7.8 K/UL (ref 4.3–11.1)

## 2021-06-15 PROCEDURE — 85025 COMPLETE CBC W/AUTO DIFF WBC: CPT

## 2021-06-15 PROCEDURE — 83036 HEMOGLOBIN GLYCOSYLATED A1C: CPT

## 2021-06-15 PROCEDURE — 36415 COLL VENOUS BLD VENIPUNCTURE: CPT

## 2021-06-15 PROCEDURE — 80053 COMPREHEN METABOLIC PANEL: CPT

## 2021-07-20 ENCOUNTER — HOSPITAL ENCOUNTER (OUTPATIENT)
Dept: CT IMAGING | Age: 21
Discharge: HOME | End: 2021-07-20
Attending: UROLOGY

## 2021-07-20 DIAGNOSIS — R31.29 MICROSCOPIC HEMATURIA: ICD-10-CM

## 2021-07-20 RX ORDER — SODIUM CHLORIDE 0.9 % (FLUSH) 0.9 %
10 SYRINGE (ML) INJECTION
Status: COMPLETED | OUTPATIENT
Start: 2021-07-20 | End: 2021-07-20

## 2021-07-20 RX ADMIN — Medication 10 ML: at 14:37

## 2021-08-07 ENCOUNTER — HOSPITAL ENCOUNTER (EMERGENCY)
Age: 21
Discharge: HOME | End: 2021-08-07
Attending: EMERGENCY MEDICINE
Payer: OTHER GOVERNMENT

## 2021-08-07 VITALS
DIASTOLIC BLOOD PRESSURE: 91 MMHG | HEART RATE: 78 BPM | TEMPERATURE: 98.7 F | OXYGEN SATURATION: 100 % | SYSTOLIC BLOOD PRESSURE: 139 MMHG | RESPIRATION RATE: 18 BRPM

## 2021-08-07 DIAGNOSIS — O20.9 FIRST TRIMESTER BLEEDING: Primary | ICD-10-CM

## 2021-08-07 LAB
ANION GAP SERPL CALC-SCNC: 5 MMOL/L (ref 7–16)
BASOPHILS # BLD: 0 K/UL (ref 0–0.2)
BASOPHILS NFR BLD: 0 % (ref 0–2)
BUN SERPL-MCNC: 8 MG/DL (ref 6–23)
CALCIUM SERPL-MCNC: 8.7 MG/DL (ref 8.3–10.4)
CHLORIDE SERPL-SCNC: 108 MMOL/L (ref 98–107)
CO2 SERPL-SCNC: 29 MMOL/L (ref 21–32)
CREAT SERPL-MCNC: 0.46 MG/DL (ref 0.6–1)
DIFFERENTIAL METHOD BLD: ABNORMAL
EOSINOPHIL # BLD: 0 K/UL (ref 0–0.8)
EOSINOPHIL NFR BLD: 0 % (ref 0.5–7.8)
ERYTHROCYTE [DISTWIDTH] IN BLOOD BY AUTOMATED COUNT: 12.7 % (ref 11.9–14.6)
GLUCOSE SERPL-MCNC: 75 MG/DL (ref 65–100)
HCG SERPL-ACNC: 26 MIU/ML (ref 0–6)
HCT VFR BLD AUTO: 35.3 % (ref 35.8–46.3)
HGB BLD-MCNC: 11.8 G/DL (ref 11.7–15.4)
IMM GRANULOCYTES # BLD AUTO: 0 K/UL (ref 0–0.5)
IMM GRANULOCYTES NFR BLD AUTO: 0 % (ref 0–5)
LYMPHOCYTES # BLD: 2.6 K/UL (ref 0.5–4.6)
LYMPHOCYTES NFR BLD: 39 % (ref 13–44)
MCH RBC QN AUTO: 29.4 PG (ref 26.1–32.9)
MCHC RBC AUTO-ENTMCNC: 33.4 G/DL (ref 31.4–35)
MCV RBC AUTO: 87.8 FL (ref 79.6–97.8)
MONOCYTES # BLD: 0.6 K/UL (ref 0.1–1.3)
MONOCYTES NFR BLD: 8 % (ref 4–12)
NEUTS SEG # BLD: 3.6 K/UL (ref 1.7–8.2)
NEUTS SEG NFR BLD: 53 % (ref 43–78)
NRBC # BLD: 0 K/UL (ref 0–0.2)
PLATELET # BLD AUTO: 279 K/UL (ref 150–450)
PMV BLD AUTO: 10.8 FL (ref 9.4–12.3)
POTASSIUM SERPL-SCNC: 3.8 MMOL/L (ref 3.5–5.1)
RBC # BLD AUTO: 4.02 M/UL (ref 4.05–5.2)
SODIUM SERPL-SCNC: 142 MMOL/L (ref 136–145)
WBC # BLD AUTO: 6.8 K/UL (ref 4.3–11.1)

## 2021-08-07 PROCEDURE — 99283 EMERGENCY DEPT VISIT LOW MDM: CPT

## 2021-08-07 PROCEDURE — 85025 COMPLETE CBC W/AUTO DIFF WBC: CPT

## 2021-08-07 PROCEDURE — 84702 CHORIONIC GONADOTROPIN TEST: CPT

## 2021-08-07 PROCEDURE — 81003 URINALYSIS AUTO W/O SCOPE: CPT

## 2021-08-07 PROCEDURE — 80048 BASIC METABOLIC PNL TOTAL CA: CPT

## 2021-08-07 NOTE — ED TRIAGE NOTES
Pt c/o headaches, dizziness, lower back cramping and dark brown discharge. Pt's LMP 7/4 and she has not confirmed yet the gestation of her pregnancy.

## 2021-08-08 NOTE — DISCHARGE INSTRUCTIONS
Contact your OB docs on Monday for advice going forward  Tylenol for headache and discomfort  Follow for any fever or other changes  Emergency department labs:  Recent Results (from the past 12 hour(s))   CBC WITH AUTOMATED DIFF    Collection Time: 08/07/21  7:37 PM   Result Value Ref Range    WBC 6.8 4.3 - 11.1 K/uL    RBC 4.02 (L) 4.05 - 5.2 M/uL    HGB 11.8 11.7 - 15.4 g/dL    HCT 35.3 (L) 35.8 - 46.3 %    MCV 87.8 79.6 - 97.8 FL    MCH 29.4 26.1 - 32.9 PG    MCHC 33.4 31.4 - 35.0 g/dL    RDW 12.7 11.9 - 14.6 %    PLATELET 386 889 - 091 K/uL    MPV 10.8 9.4 - 12.3 FL    ABSOLUTE NRBC 0.00 0.0 - 0.2 K/uL    DF AUTOMATED      NEUTROPHILS 53 43 - 78 %    LYMPHOCYTES 39 13 - 44 %    MONOCYTES 8 4.0 - 12.0 %    EOSINOPHILS 0 (L) 0.5 - 7.8 %    BASOPHILS 0 0.0 - 2.0 %    IMMATURE GRANULOCYTES 0 0.0 - 5.0 %    ABS. NEUTROPHILS 3.6 1.7 - 8.2 K/UL    ABS. LYMPHOCYTES 2.6 0.5 - 4.6 K/UL    ABS. MONOCYTES 0.6 0.1 - 1.3 K/UL    ABS. EOSINOPHILS 0.0 0.0 - 0.8 K/UL    ABS. BASOPHILS 0.0 0.0 - 0.2 K/UL    ABS. IMM.  GRANS. 0.0 0.0 - 0.5 K/UL   METABOLIC PANEL, BASIC    Collection Time: 08/07/21  7:37 PM   Result Value Ref Range    Sodium 142 136 - 145 mmol/L    Potassium 3.8 3.5 - 5.1 mmol/L    Chloride 108 (H) 98 - 107 mmol/L    CO2 29 21 - 32 mmol/L    Anion gap 5 (L) 7 - 16 mmol/L    Glucose 75 65 - 100 mg/dL    BUN 8 6 - 23 MG/DL    Creatinine 0.46 (L) 0.6 - 1.0 MG/DL    GFR est AA >60 >60 ml/min/1.73m2    GFR est non-AA >60 >60 ml/min/1.73m2    Calcium 8.7 8.3 - 10.4 MG/DL   BETA HCG, QT    Collection Time: 08/07/21  7:37 PM   Result Value Ref Range    Beta HCG, QT 26 (H) 0.0 - 6.0 MIU/ML

## 2021-08-08 NOTE — ED NOTES
I have reviewed discharge instructions with the patient and spouse. The patient and spouse verbalized understanding. Patient left ED via Discharge Method: ambulatory to Home with (spouse). Opportunity for questions and clarification provided. Patient given 0 scripts. To continue your aftercare when you leave the hospital, you may receive an automated call from our care team to check in on how you are doing. This is a free service and part of our promise to provide the best care and service to meet your aftercare needs.  If you have questions, or wish to unsubscribe from this service please call [phone]. Thank you for Choosing our [facility] Emergency [facility].

## 2021-08-09 NOTE — ED PROVIDER NOTES
LNMP on 7/4. Here with some slight pink vaginal spotting that is painless. Had HCG done yesterday but do not have value. No prior pregnancy. Had abdominal CT on 7/20 and saw her OB-GYN after. No urinary issue. No infectious discharge. Is scheduled for repeat quant on Sunday    The history is provided by the patient. Pregnancy Problem   This is a new problem. The problem has not changed since onset. Pertinent negatives include no hematuria.         Past Medical History:   Diagnosis Date    Goiter     Hashimoto's thyroiditis     Irritable bowel syndrome with constipation     Vitamin D deficiency        Past Surgical History:   Procedure Laterality Date    HX COLONOSCOPY  2020    HX GYN  03/17/2020    IUD placement    HX ORTHOPAEDIC  age 1    Achilles tendon lengthening    HX TONSILLECTOMY           Family History:   Problem Relation Age of Onset    COPD Mother     Thyroid Disease Mother         hypothyroidism    Asthma Mother     Thyroid Disease Father         hyperthyroidism    Asthma Brother     Ovarian Cancer Maternal Grandmother     Heart Disease Maternal Grandmother     Dementia Paternal Grandmother     Thyroid Cancer Maternal Cousin        Social History     Socioeconomic History    Marital status:      Spouse name: Not on file    Number of children: Not on file    Years of education: Not on file    Highest education level: Not on file   Occupational History    Not on file   Tobacco Use    Smoking status: Never Smoker    Smokeless tobacco: Never Used   Substance and Sexual Activity    Alcohol use: Never    Drug use: Not Currently    Sexual activity: Yes     Partners: Male     Birth control/protection: None   Other Topics Concern    Not on file   Social History Narrative    Not on file     Social Determinants of Health     Financial Resource Strain:     Difficulty of Paying Living Expenses:    Food Insecurity:     Worried About Running Out of Food in the Last Year:     Ran Out of Food in the Last Year:    Transportation Needs:     Lack of Transportation (Medical):  Lack of Transportation (Non-Medical):    Physical Activity:     Days of Exercise per Week:     Minutes of Exercise per Session:    Stress:     Feeling of Stress :    Social Connections:     Frequency of Communication with Friends and Family:     Frequency of Social Gatherings with Friends and Family:     Attends Anglican Services:     Active Member of Clubs or Organizations:     Attends Club or Organization Meetings:     Marital Status:    Intimate Partner Violence:     Fear of Current or Ex-Partner:     Emotionally Abused:     Physically Abused:     Sexually Abused: ALLERGIES: Patient has no known allergies. Review of Systems   Genitourinary: Positive for vaginal bleeding. Negative for flank pain, hematuria, vaginal discharge and vaginal pain. Hematological: Does not bruise/bleed easily. Psychiatric/Behavioral: Negative for confusion and decreased concentration. All other systems reviewed and are negative. Vitals:    08/07/21 1818   BP: (!) 139/91   Pulse: 78   Resp: 18   Temp: 98.7 °F (37.1 °C)   SpO2: 100%            Physical Exam  Vitals and nursing note reviewed. Constitutional:       General: She is not in acute distress. Appearance: She is well-developed and normal weight. She is not ill-appearing or diaphoretic. HENT:      Head: Atraumatic. Eyes:      General: No scleral icterus. Cardiovascular:      Rate and Rhythm: Normal rate and regular rhythm. Pulses: Normal pulses. Pulmonary:      Effort: Pulmonary effort is normal. No respiratory distress. Abdominal:      Palpations: Abdomen is soft. Tenderness: There is no abdominal tenderness. There is no right CVA tenderness, left CVA tenderness, guarding or rebound. Musculoskeletal:      Cervical back: Neck supple. Right lower leg: No edema. Left lower leg: No edema.    Skin:     General: Skin is warm and dry. Neurological:      Mental Status: She is alert. Mental status is at baseline. Psychiatric:         Thought Content: Thought content normal.          MDM  Number of Diagnoses or Management Options  Diagnosis management comments: Painless and slight vaginal bleeding. Stable. Very early by dates. preg calculator has her at 5 weeks       Amount and/or Complexity of Data Reviewed  Clinical lab tests: ordered and reviewed  Decide to obtain previous medical records or to obtain history from someone other than the patient: yes    Risk of Complications, Morbidity, and/or Mortality  Presenting problems: moderate  Diagnostic procedures: low  Management options: moderate  General comments:  To return with any worsening or problems    Patient Progress  Patient progress: stable         Procedures

## 2021-08-25 ENCOUNTER — TRANSCRIBE ORDER (OUTPATIENT)
Dept: SCHEDULING | Age: 21
End: 2021-08-25

## 2021-08-25 DIAGNOSIS — R10.31 ABDOMINAL PAIN, RIGHT LOWER QUADRANT: Primary | ICD-10-CM

## 2021-08-26 ENCOUNTER — HOSPITAL ENCOUNTER (OUTPATIENT)
Dept: CT IMAGING | Age: 21
Discharge: HOME | End: 2021-08-26
Attending: NURSE PRACTITIONER

## 2021-08-26 DIAGNOSIS — R10.31 ABDOMINAL PAIN, RIGHT LOWER QUADRANT: ICD-10-CM

## 2021-08-28 ENCOUNTER — HOSPITAL ENCOUNTER (EMERGENCY)
Age: 21
Discharge: HOME | End: 2021-08-28
Attending: EMERGENCY MEDICINE
Payer: OTHER GOVERNMENT

## 2021-08-28 ENCOUNTER — APPOINTMENT (OUTPATIENT)
Dept: CT IMAGING | Age: 21
End: 2021-08-28
Attending: EMERGENCY MEDICINE
Payer: OTHER GOVERNMENT

## 2021-08-28 VITALS
OXYGEN SATURATION: 100 % | HEART RATE: 102 BPM | DIASTOLIC BLOOD PRESSURE: 76 MMHG | RESPIRATION RATE: 18 BRPM | TEMPERATURE: 98.7 F | SYSTOLIC BLOOD PRESSURE: 134 MMHG | BODY MASS INDEX: 20.81 KG/M2 | HEIGHT: 60 IN | WEIGHT: 106 LBS

## 2021-08-28 DIAGNOSIS — K52.9 NONINFECTIOUS GASTROENTERITIS, UNSPECIFIED TYPE: ICD-10-CM

## 2021-08-28 DIAGNOSIS — R10.31 RLQ ABDOMINAL PAIN: Primary | ICD-10-CM

## 2021-08-28 LAB
ALBUMIN SERPL-MCNC: 4.4 G/DL (ref 3.5–5)
ALBUMIN/GLOB SERPL: 1.2 {RATIO} (ref 1.2–3.5)
ALP SERPL-CCNC: 50 U/L (ref 50–130)
ALT SERPL-CCNC: 20 U/L (ref 12–65)
ANION GAP SERPL CALC-SCNC: 4 MMOL/L (ref 7–16)
AST SERPL-CCNC: 20 U/L (ref 15–37)
BASOPHILS # BLD: 0 K/UL (ref 0–0.2)
BASOPHILS NFR BLD: 1 % (ref 0–2)
BILIRUB SERPL-MCNC: 0.6 MG/DL (ref 0.2–1.1)
BUN SERPL-MCNC: 12 MG/DL (ref 6–23)
CALCIUM SERPL-MCNC: 9.1 MG/DL (ref 8.3–10.4)
CHLORIDE SERPL-SCNC: 104 MMOL/L (ref 98–107)
CO2 SERPL-SCNC: 30 MMOL/L (ref 21–32)
CREAT SERPL-MCNC: 0.53 MG/DL (ref 0.6–1)
CRP SERPL-MCNC: <0.3 MG/DL (ref 0–0.9)
DIFFERENTIAL METHOD BLD: ABNORMAL
EOSINOPHIL # BLD: 0 K/UL (ref 0–0.8)
EOSINOPHIL NFR BLD: 0 % (ref 0.5–7.8)
ERYTHROCYTE [DISTWIDTH] IN BLOOD BY AUTOMATED COUNT: 12.5 % (ref 11.9–14.6)
GLOBULIN SER CALC-MCNC: 3.8 G/DL (ref 2.3–3.5)
GLUCOSE SERPL-MCNC: 104 MG/DL (ref 65–100)
HCT VFR BLD AUTO: 42.6 % (ref 35.8–46.3)
HGB BLD-MCNC: 13.9 G/DL (ref 11.7–15.4)
IMM GRANULOCYTES # BLD AUTO: 0 K/UL (ref 0–0.5)
IMM GRANULOCYTES NFR BLD AUTO: 0 % (ref 0–5)
LIPASE SERPL-CCNC: 52 U/L (ref 73–393)
LYMPHOCYTES # BLD: 2 K/UL (ref 0.5–4.6)
LYMPHOCYTES NFR BLD: 40 % (ref 13–44)
MCH RBC QN AUTO: 28.8 PG (ref 26.1–32.9)
MCHC RBC AUTO-ENTMCNC: 32.6 G/DL (ref 31.4–35)
MCV RBC AUTO: 88.2 FL (ref 79.6–97.8)
MONOCYTES # BLD: 0.4 K/UL (ref 0.1–1.3)
MONOCYTES NFR BLD: 8 % (ref 4–12)
NEUTS SEG # BLD: 2.5 K/UL (ref 1.7–8.2)
NEUTS SEG NFR BLD: 52 % (ref 43–78)
NRBC # BLD: 0 K/UL (ref 0–0.2)
PLATELET # BLD AUTO: 268 K/UL (ref 150–450)
PMV BLD AUTO: 10.7 FL (ref 9.4–12.3)
POTASSIUM SERPL-SCNC: 3.8 MMOL/L (ref 3.5–5.1)
PROT SERPL-MCNC: 8.2 G/DL (ref 6.3–8.2)
RBC # BLD AUTO: 4.83 M/UL (ref 4.05–5.2)
SERVICE CMNT-IMP: NORMAL
SODIUM SERPL-SCNC: 138 MMOL/L (ref 136–145)
WBC # BLD AUTO: 4.9 K/UL (ref 4.3–11.1)
WET PREP GENITAL: NORMAL
WET PREP GENITAL: NORMAL

## 2021-08-28 PROCEDURE — 74011000250 HC RX REV CODE- 250: Performed by: EMERGENCY MEDICINE

## 2021-08-28 PROCEDURE — 74011000258 HC RX REV CODE- 258: Performed by: EMERGENCY MEDICINE

## 2021-08-28 PROCEDURE — 87329 GIARDIA AG IA: CPT

## 2021-08-28 PROCEDURE — 86140 C-REACTIVE PROTEIN: CPT

## 2021-08-28 PROCEDURE — 96375 TX/PRO/DX INJ NEW DRUG ADDON: CPT

## 2021-08-28 PROCEDURE — 87491 CHLMYD TRACH DNA AMP PROBE: CPT

## 2021-08-28 PROCEDURE — 74011250636 HC RX REV CODE- 250/636: Performed by: PHYSICIAN ASSISTANT

## 2021-08-28 PROCEDURE — 80053 COMPREHEN METABOLIC PANEL: CPT

## 2021-08-28 PROCEDURE — 87210 SMEAR WET MOUNT SALINE/INK: CPT

## 2021-08-28 PROCEDURE — 74011000636 HC RX REV CODE- 636: Performed by: EMERGENCY MEDICINE

## 2021-08-28 PROCEDURE — 96374 THER/PROPH/DIAG INJ IV PUSH: CPT

## 2021-08-28 PROCEDURE — 99284 EMERGENCY DEPT VISIT MOD MDM: CPT

## 2021-08-28 PROCEDURE — 85025 COMPLETE CBC W/AUTO DIFF WBC: CPT

## 2021-08-28 PROCEDURE — 83690 ASSAY OF LIPASE: CPT

## 2021-08-28 PROCEDURE — 74011250636 HC RX REV CODE- 250/636: Performed by: EMERGENCY MEDICINE

## 2021-08-28 PROCEDURE — 87209 SMEAR COMPLEX STAIN: CPT

## 2021-08-28 PROCEDURE — 87045 FECES CULTURE AEROBIC BACT: CPT

## 2021-08-28 PROCEDURE — 87046 STOOL CULTR AEROBIC BACT EA: CPT

## 2021-08-28 PROCEDURE — 96372 THER/PROPH/DIAG INJ SC/IM: CPT

## 2021-08-28 PROCEDURE — 74177 CT ABD & PELVIS W/CONTRAST: CPT

## 2021-08-28 RX ORDER — SODIUM CHLORIDE 0.9 % (FLUSH) 0.9 %
10 SYRINGE (ML) INJECTION
Status: COMPLETED | OUTPATIENT
Start: 2021-08-28 | End: 2021-08-28

## 2021-08-28 RX ORDER — CIPROFLOXACIN 500 MG/1
500 TABLET ORAL 2 TIMES DAILY
Qty: 10 TABLET | Refills: 0 | Status: SHIPPED | OUTPATIENT
Start: 2021-08-28 | End: 2021-09-02

## 2021-08-28 RX ORDER — ONDANSETRON 2 MG/ML
4 INJECTION INTRAMUSCULAR; INTRAVENOUS
Status: COMPLETED | OUTPATIENT
Start: 2021-08-28 | End: 2021-08-28

## 2021-08-28 RX ORDER — KETOROLAC TROMETHAMINE 30 MG/ML
15 INJECTION, SOLUTION INTRAMUSCULAR; INTRAVENOUS
Status: COMPLETED | OUTPATIENT
Start: 2021-08-28 | End: 2021-08-28

## 2021-08-28 RX ORDER — MORPHINE SULFATE 4 MG/ML
4 INJECTION INTRAVENOUS
Status: COMPLETED | OUTPATIENT
Start: 2021-08-28 | End: 2021-08-28

## 2021-08-28 RX ORDER — METRONIDAZOLE 500 MG/1
500 TABLET ORAL 3 TIMES DAILY
Qty: 15 TABLET | Refills: 0 | Status: SHIPPED | OUTPATIENT
Start: 2021-08-28 | End: 2021-09-02

## 2021-08-28 RX ADMIN — IOPAMIDOL 100 ML: 755 INJECTION, SOLUTION INTRAVENOUS at 14:04

## 2021-08-28 RX ADMIN — SODIUM CHLORIDE 100 ML: 900 INJECTION, SOLUTION INTRAVENOUS at 14:04

## 2021-08-28 RX ADMIN — LIDOCAINE HYDROCHLORIDE 500 MG: 10 INJECTION, SOLUTION INFILTRATION; PERINEURAL at 12:43

## 2021-08-28 RX ADMIN — KETOROLAC TROMETHAMINE 15 MG: 30 INJECTION, SOLUTION INTRAMUSCULAR at 09:56

## 2021-08-28 RX ADMIN — SODIUM CHLORIDE 1000 ML: 900 INJECTION, SOLUTION INTRAVENOUS at 09:55

## 2021-08-28 RX ADMIN — DIATRIZOATE MEGLUMINE AND DIATRIZOATE SODIUM 15 ML: 660; 100 LIQUID ORAL; RECTAL at 12:11

## 2021-08-28 RX ADMIN — MORPHINE SULFATE 4 MG: 4 INJECTION INTRAVENOUS at 09:23

## 2021-08-28 RX ADMIN — Medication 10 ML: at 14:04

## 2021-08-28 RX ADMIN — ONDANSETRON 4 MG: 2 INJECTION INTRAMUSCULAR; INTRAVENOUS at 09:18

## 2021-08-28 NOTE — ED PROVIDER NOTES
52-year-old female with history of Hashimoto's thyroiditis, IBS presents with complaint of worsening sharp, cramping right lower quadrant abdominal pain been present over the past several days. Patient had a CT abdomen pelvis performed outpatient on Thursday with appendix unable to be identified. Patient was seen at [facility] ED on yesterday where pelvic ultrasound was performed and basic lab work was performed. Labs unremarkable. Ultrasound results in MDM. Patient was discharged home. States she has had worsening right lower quadrant pain. Reports nausea. Reports occasional diarrhea. Denies pelvic pain, vaginal bleeding, vaginal discharge, dizziness, weakness, fever, chills. Patient does state that she had a miscarriage around 2 to 3 weeks ago. The history is provided by the patient. No  was used. Abdominal Pain   This is a new problem. The current episode started more than 2 days ago. The problem occurs constantly. The problem has not changed since onset. The pain is located in the RLQ. The quality of the pain is cramping and sharp. The pain is at a severity of 8/10. The pain is moderate. Associated symptoms include diarrhea and nausea. Pertinent negatives include no anorexia, no fever, no belching, no flatus, no vomiting, no constipation, no dysuria, no frequency, no hematuria, no headaches, no arthralgias, no myalgias, no chest pain and no back pain. The pain is worsened by palpation. The pain is relieved by nothing.         Past Medical History:   Diagnosis Date    Goiter     Hashimoto's thyroiditis     Irritable bowel syndrome with constipation     Vitamin D deficiency        Past Surgical History:   Procedure Laterality Date    HX COLONOSCOPY  2020    HX GYN  03/17/2020    IUD placement    HX ORTHOPAEDIC  age 1    Achilles tendon lengthening    HX TONSILLECTOMY           Family History:   Problem Relation Age of Onset    COPD Mother     Thyroid Disease Mother hypothyroidism    Asthma Mother     Thyroid Disease Father         hyperthyroidism    Asthma Brother     Ovarian Cancer Maternal Grandmother     Heart Disease Maternal Grandmother     Dementia Paternal Grandmother     Thyroid Cancer Maternal Cousin        Social History     Socioeconomic History    Marital status:      Spouse name: Not on file    Number of children: Not on file    Years of education: Not on file    Highest education level: Not on file   Occupational History    Not on file   Tobacco Use    Smoking status: Never Smoker    Smokeless tobacco: Never Used   Substance and Sexual Activity    Alcohol use: Never    Drug use: Not Currently    Sexual activity: Yes     Partners: Male     Birth control/protection: None   Other Topics Concern    Not on file   Social History Narrative    Not on file     Social Determinants of Health     Financial Resource Strain:     Difficulty of Paying Living Expenses:    Food Insecurity:     Worried About Running Out of Food in the Last Year:     Ran Out of Food in the Last Year:    Transportation Needs:     Lack of Transportation (Medical):  Lack of Transportation (Non-Medical):    Physical Activity:     Days of Exercise per Week:     Minutes of Exercise per Session:    Stress:     Feeling of Stress :    Social Connections:     Frequency of Communication with Friends and Family:     Frequency of Social Gatherings with Friends and Family:     Attends Restorationist Services:     Active Member of Clubs or Organizations:     Attends Club or Organization Meetings:     Marital Status:    Intimate Partner Violence:     Fear of Current or Ex-Partner:     Emotionally Abused:     Physically Abused:     Sexually Abused: ALLERGIES: Patient has no known allergies. Review of Systems   Constitutional: Negative for chills, diaphoresis, fatigue and fever. HENT: Negative for congestion and rhinorrhea.     Respiratory: Negative for cough and shortness of breath. Cardiovascular: Negative for chest pain. Gastrointestinal: Positive for abdominal pain, diarrhea and nausea. Negative for anorexia, blood in stool, constipation, flatus and vomiting. Genitourinary: Negative for dysuria, flank pain, frequency, hematuria, pelvic pain, vaginal bleeding and vaginal discharge. Musculoskeletal: Negative for arthralgias, back pain and myalgias. Skin: Negative for color change and rash. Neurological: Negative for dizziness, syncope, weakness, light-headedness and headaches. Hematological: Does not bruise/bleed easily. Vitals:    08/28/21 0905 08/28/21 0907 08/28/21 0927 08/28/21 0928   BP: (!) 132/93  103/81    Pulse:  (!) 102     Resp: 18      Temp:  98.7 °F (37.1 °C)     SpO2: 100%  96% 99%   Weight: 48.1 kg (106 lb)      Height: 5' (1.524 m)               Physical Exam  Vitals and nursing note reviewed. Constitutional:       Appearance: She is well-developed. HENT:      Head: Normocephalic. Mouth/Throat:      Mouth: Mucous membranes are moist.   Eyes:      Extraocular Movements: Extraocular movements intact. Pupils: Pupils are equal, round, and reactive to light. Cardiovascular:      Rate and Rhythm: Regular rhythm. Tachycardia present. Heart sounds: Normal heart sounds. Pulmonary:      Effort: Pulmonary effort is normal.      Breath sounds: Normal breath sounds. Abdominal:      General: Bowel sounds are normal.      Palpations: Abdomen is soft. Tenderness: There is abdominal tenderness in the right lower quadrant. Comments: Soft. Moderate right lower quadrant tenderness to place. No rebound or guarding. No peritoneal signs. No CVA tenderness. Skin:     General: Skin is warm. Coloration: Skin is not pale. Findings: No rash. Neurological:      General: No focal deficit present. Mental Status: She is alert and oriented to person, place, and time. Motor: No weakness.           [facility]  Number of Diagnoses or Management Options  Noninfectious gastroenteritis, unspecified type  RLQ abdominal pain: new and requires workup  Diagnosis management comments: Vital signs stable. All labs unremarkable. Discussed case with radiologist.  Patient underwent CT pelvis on Thursday and not CT abdomen and pelvis. Patient underwent pelvic ultrasound yesterday at [facility]. States unable to completely evaluate appendix on CT performed on Thursday. Request attempt to perform pelvic exam to determine if that is the etiology of symptoms. Pelvic exam performed with mild/moderate cervical motion tenderness and discharge present. Rocephin 500 mg IM given. Patient still reports pain and discomfort therefore decision made for CT abdomen and pelvis with IV contrast.  CT with findings concerning for possible colitis. Will discuss with gastroenterology. Spoke with Dr. Gabriel Lopez who requests stool studies. Request discharge home with Cipro, Flagyl, and close outpatient follow-up. Amount and/or Complexity of Data Reviewed  Clinical lab tests: ordered and reviewed  Tests in the radiology section of CPT®: reviewed  Tests in the medicine section of CPT®: ordered and reviewed  Review and summarize past medical records: yes (US pelvis 8/27/21:  FINDINGS:   . Uterus: Normal. Uterus measures 3.2 x 8.5 x 4.8 cm with volume of 69.2 mL. Endometrium measures 10 mm. . Right ovary/adnexa: Normal. Size: 1.9 x 3.4 x 2.5 cm. Volume 8.4 cc. . Left ovary/adnexa: Normal. Size: 2.2 x 4.1 x 2.3 cm. Volume 11.1 cc. Dominant follicle in the left ovary measures up to 1.5 cm. . Doppler: Arterial and venous blood flow is documented in the ovaries by color and spectral Doppler analysis. IMPRESSION:  Unremarkable sonographic appearance of the uterus and bilateral ovaries. Trace free fluid in the posterior cul-de-sac, likely physiologic.     Signed by: 8/27/2021 1:23 PM: Jaida Loras  =========================================  CT pelvis on 8/26/21  FINDINGS:     urinary: No abnormality     Reproductive: No perceptible uterine abnormality on CT. No adnexal mass.     Bowel: No bowel obstruction or bowel wall thickening. The appendix is not  identified.       Peritoneum: Trace free fluid in the pelvis. No free air.     Lymphovascular: No AAA. No adenopathy.     Abdominal wall: No bowel-containing hernia.     Bones: No suspicious osseous lesion.      IMPRESSION  1. Appendix is not identified. 2. Trace free fluid in the posterior pelvis, indeterminant. 3. No adnexal mass. No definite uterine abnormality, limited on CT.)  Independent visualization of images, tracings, or specimens: yes    Risk of Complications, Morbidity, and/or Mortality  Presenting problems: moderate  Diagnostic procedures: moderate  Management options: moderate    Patient Progress  Patient progress: stable    ED Course as of Aug 28 1434   Sat Aug 28, 2021   1004 C-Reactive protein: <0.3 [DF]   1004 WBC: 4.9 [DF]   1433 CT abd/pelvis IMPRESSION  1. Suggested inflammatory changes of a short segment of ascending colon just  proximal to the hepatic flexure. The appearance suggests a limited colitis. This  is not a watershed territory and isolated involvement at this level would be  unusual for inflammatory etiologies such as Crohn's or ulcerative colitis. Therefore, an infectious etiology is favored. [DF]   3432 Gastroenterology consulted. Awaiting callback. [DF]      ED Course User Index  [DF] Satish Lopez MD       Pelvic Exam    Date/Time: 8/28/2021 2:34 PM  Performed by: attending  Procedure duration:  4 minutes. Exam assisted by:  JAYSON Suárez. Type of exam performed: bimanual and speculum. External genitalia appearance: normal.    Vaginal exam:  discharge (No bleeding, lesions, odor. ). The amount of discharge was:  moderate. The discharge was thin and white.    Cervical exam:  os closed, moderate cervical motion tenderness and normal.    Specimen(s) collected:  chlamydia and GC. Bimanual exam:  right adenexal tenderness. Patient tolerance: patient tolerated the procedure well with no immediate complications                     Results Include:    Recent Results (from the past 24 hour(s))   LIPASE    Collection Time: 08/28/21  9:16 AM   Result Value Ref Range    Lipase 52 (L) 73 - 393 U/L   CBC WITH AUTOMATED DIFF    Collection Time: 08/28/21  9:16 AM   Result Value Ref Range    WBC 4.9 4.3 - 11.1 K/uL    RBC 4.83 4.05 - 5.2 M/uL    HGB 13.9 11.7 - 15.4 g/dL    HCT 42.6 35.8 - 46.3 %    MCV 88.2 79.6 - 97.8 FL    MCH 28.8 26.1 - 32.9 PG    MCHC 32.6 31.4 - 35.0 g/dL    RDW 12.5 11.9 - 14.6 %    PLATELET 126 827 - 070 K/uL    MPV 10.7 9.4 - 12.3 FL    ABSOLUTE NRBC 0.00 0.0 - 0.2 K/uL    DF AUTOMATED      NEUTROPHILS 52 43 - 78 %    LYMPHOCYTES 40 13 - 44 %    MONOCYTES 8 4.0 - 12.0 %    EOSINOPHILS 0 (L) 0.5 - 7.8 %    BASOPHILS 1 0.0 - 2.0 %    IMMATURE GRANULOCYTES 0 0.0 - 5.0 %    ABS. NEUTROPHILS 2.5 1.7 - 8.2 K/UL    ABS. LYMPHOCYTES 2.0 0.5 - 4.6 K/UL    ABS. MONOCYTES 0.4 0.1 - 1.3 K/UL    ABS. EOSINOPHILS 0.0 0.0 - 0.8 K/UL    ABS. BASOPHILS 0.0 0.0 - 0.2 K/UL    ABS. IMM. GRANS. 0.0 0.0 - 0.5 K/UL   METABOLIC PANEL, COMPREHENSIVE    Collection Time: 08/28/21  9:16 AM   Result Value Ref Range    Sodium 138 136 - 145 mmol/L    Potassium 3.8 3.5 - 5.1 mmol/L    Chloride 104 98 - 107 mmol/L    CO2 30 21 - 32 mmol/L    Anion gap 4 (L) 7 - 16 mmol/L    Glucose 104 (H) 65 - 100 mg/dL    BUN 12 6 - 23 MG/DL    Creatinine 0.53 (L) 0.6 - 1.0 MG/DL    GFR est AA >60 >60 ml/min/1.73m2    GFR est non-AA >60 >60 ml/min/1.73m2    Calcium 9.1 8.3 - 10.4 MG/DL    Bilirubin, total 0.6 0.2 - 1.1 MG/DL    ALT (SGPT) 20 12 - 65 U/L    AST (SGOT) 20 15 - 37 U/L    Alk.  phosphatase 50 50 - 130 U/L    Protein, total 8.2 6.3 - 8.2 g/dL    Albumin 4.4 3.5 - 5.0 g/dL    Globulin 3.8 (H) 2.3 - 3.5 g/dL    A-G Ratio 1.2 1.2 - 3.5     C REACTIVE PROTEIN, QT    Collection Time: 08/28/21  9:16 AM   Result Value Ref Range    C-Reactive protein <0.3 0.0 - 0.9 mg/dL   WET PREP    Collection Time: 08/28/21 11:30 AM    Specimen: Vaginal Specimen   Result Value Ref Range    Special Requests: NO SPECIAL REQUESTS      Wet prep NO YEAST,TRICHOMONAS OR CLUE CELLS NOTED      Wet prep WBC 3 TO 5/HPF            Teresa Kulkarni MD; 8/28/2021 @2:53 PM Voice dictation software was used during the making of this note. This software is not perfect and grammatical and other typographical errors may be present.   This note has not been proofread for errors.  ===================================================================

## 2021-08-28 NOTE — ED NOTES
I have reviewed discharge instructions with the patient. The patient verbalized understanding. Patient left ED via Discharge Method: ambulatory to Home with family. Opportunity for questions and clarification provided. Patient given 2 scripts. To continue your aftercare when you leave the hospital, you may receive an automated call from our care team to check in on how you are doing. This is a free service and part of our promise to provide the best care and service to meet your aftercare needs.  If you have questions, or wish to unsubscribe from this service please call [phone]. Thank you for Choosing our [facility] Emergency Department.

## 2021-08-28 NOTE — ED NOTES
Pt sent home with instructions for stool sample. MD chapa. Rounding on patient.  Sounds extremely wet/gurgly.  C/o SOB.  O2 sats in low 80's.  Placed on non-rebreather. .Dr. Barrow notified.   O2 sats up to 98%.  Stat CXR, ABG, Nebs Q6 ordered.  Awaiting CXR results.

## 2021-08-28 NOTE — ED NOTES
Has had extensive workup at [facility], however presents today for second opinion. Severe abdominal pain 8 out of 10. Patient is visibly uncomfortable in triage. Patient evaluated initially in triage. Rapid Medical Evaluation was conducted and necessary orders have been placed. I have performed a medical screening exam.  Care will now be transferred to the attending physician in the emergency department.   LATA Cash 9:07 AM

## 2021-08-28 NOTE — ED TRIAGE NOTES
Pt reports increase in abd pain, reports has had us and ct scan this week. Was seen at [facility] yesterday. Hx of ibs.

## 2021-08-31 LAB
G LAMBLIA AG STL QL IA: NEGATIVE
SPECIMEN SOURCE: NORMAL

## 2021-09-01 LAB
BACTERIA SPEC CULT: NORMAL
C TRACH RRNA SPEC QL NAA+PROBE: NEGATIVE
N GONORRHOEA RRNA SPEC QL NAA+PROBE: NEGATIVE
SERVICE CMNT-IMP: NORMAL
SPECIMEN SOURCE: NORMAL

## 2021-09-03 LAB
BACTERIA SPEC CULT: NORMAL
CAMPYLOBACTER STL CULT: NORMAL
O+P SPEC MICRO: NORMAL
O+P STL CONC: NORMAL
SPECIMEN SOURCE: NORMAL
SPECIMEN SOURCE: NORMAL

## 2021-09-08 PROBLEM — F32.9 MAJOR DEPRESSIVE DISORDER, SINGLE EPISODE WITH ANXIOUS DISTRESS: Status: ACTIVE | Noted: 2018-10-11

## 2021-11-03 PROBLEM — F32.9 MAJOR DEPRESSIVE DISORDER, SINGLE EPISODE WITH ANXIOUS DISTRESS: Status: RESOLVED | Noted: 2018-10-11 | Resolved: 2021-11-03

## 2021-11-03 PROBLEM — R07.2 PRECORDIAL PAIN: Status: RESOLVED | Noted: 2020-06-26 | Resolved: 2021-11-03

## 2021-11-03 PROBLEM — F41.9 ANXIETY AND DEPRESSION: Status: ACTIVE | Noted: 2021-11-03

## 2021-11-03 PROBLEM — R00.2 PALPITATIONS: Status: RESOLVED | Noted: 2020-06-26 | Resolved: 2021-11-03

## 2021-11-03 PROBLEM — R09.89 GLOBUS PHARYNGEUS: Status: RESOLVED | Noted: 2019-12-13 | Resolved: 2021-11-03

## 2021-11-03 PROBLEM — E04.9 GOITER: Status: RESOLVED | Noted: 2019-12-13 | Resolved: 2021-11-03

## 2021-11-03 PROBLEM — F32.A ANXIETY AND DEPRESSION: Status: ACTIVE | Noted: 2021-11-03

## 2021-11-03 PROBLEM — Z34.90 PREGNANCY: Status: ACTIVE | Noted: 2021-11-03

## 2021-11-04 PROBLEM — E03.8 SUBCLINICAL HYPOTHYROIDISM: Status: ACTIVE | Noted: 2021-11-04

## 2022-03-19 PROBLEM — Z80.8 FAMILY HISTORY OF THYROID CANCER: Status: ACTIVE | Noted: 2019-12-13

## 2022-04-29 ENCOUNTER — HOSPITAL ENCOUNTER (INPATIENT)
Age: 22
LOS: 1 days | Discharge: HOME | DRG: 833 | End: 2022-04-30
Attending: OBSTETRICS & GYNECOLOGY | Admitting: OBSTETRICS & GYNECOLOGY
Payer: OTHER GOVERNMENT

## 2022-04-29 DIAGNOSIS — O47.03 THREATENED PRETERM LABOR, THIRD TRIMESTER: ICD-10-CM

## 2022-04-29 LAB
ALBUMIN SERPL-MCNC: 2.4 G/DL (ref 3.5–5)
ALBUMIN/GLOB SERPL: 0.5 {RATIO} (ref 1.2–3.5)
ALP SERPL-CCNC: 93 U/L (ref 50–136)
ALT SERPL-CCNC: 12 U/L (ref 12–65)
ANION GAP SERPL CALC-SCNC: 9 MMOL/L (ref 7–16)
AST SERPL-CCNC: 15 U/L (ref 15–37)
BACTERIA SPEC CULT: NORMAL
BASOPHILS # BLD: 0.1 K/UL (ref 0–0.2)
BASOPHILS NFR BLD: 1 % (ref 0–2)
BILIRUB SERPL-MCNC: 0.3 MG/DL (ref 0.2–1.1)
BUN SERPL-MCNC: 7 MG/DL (ref 6–23)
CALCIUM SERPL-MCNC: 9.3 MG/DL (ref 8.3–10.4)
CHLORIDE SERPL-SCNC: 105 MMOL/L (ref 98–107)
CO2 SERPL-SCNC: 23 MMOL/L (ref 21–32)
CREAT SERPL-MCNC: 0.44 MG/DL (ref 0.6–1)
DIFFERENTIAL METHOD BLD: ABNORMAL
EOSINOPHIL # BLD: 0.2 K/UL (ref 0–0.8)
EOSINOPHIL NFR BLD: 1 % (ref 0.5–7.8)
ERYTHROCYTE [DISTWIDTH] IN BLOOD BY AUTOMATED COUNT: 12.9 % (ref 11.9–14.6)
FIBRONECTIN FETAL VAG QL: NEGATIVE
GLOBULIN SER CALC-MCNC: 4.5 G/DL (ref 2.3–3.5)
GLUCOSE SERPL-MCNC: 84 MG/DL (ref 65–100)
HCT VFR BLD AUTO: 32.3 % (ref 35.8–46.3)
HGB BLD-MCNC: 10.5 G/DL (ref 11.7–15.4)
IMM GRANULOCYTES # BLD AUTO: 0.4 K/UL (ref 0–0.5)
IMM GRANULOCYTES NFR BLD AUTO: 3 % (ref 0–5)
LYMPHOCYTES # BLD: 1.6 K/UL (ref 0.5–4.6)
LYMPHOCYTES NFR BLD: 13 % (ref 13–44)
MCH RBC QN AUTO: 26.5 PG (ref 26.1–32.9)
MCHC RBC AUTO-ENTMCNC: 32.5 G/DL (ref 31.4–35)
MCV RBC AUTO: 81.6 FL (ref 79.6–97.8)
MONOCYTES # BLD: 1 K/UL (ref 0.1–1.3)
MONOCYTES NFR BLD: 8 % (ref 4–12)
NEUTS SEG # BLD: 9.4 K/UL (ref 1.7–8.2)
NEUTS SEG NFR BLD: 75 % (ref 43–78)
NRBC # BLD: 0 K/UL (ref 0–0.2)
PLATELET # BLD AUTO: 242 K/UL (ref 150–450)
PMV BLD AUTO: 11.3 FL (ref 9.4–12.3)
POTASSIUM SERPL-SCNC: 3.7 MMOL/L (ref 3.5–5.1)
PROT SERPL-MCNC: 6.9 G/DL (ref 6.3–8.2)
RBC # BLD AUTO: 3.96 M/UL (ref 4.05–5.2)
SERVICE CMNT-IMP: NORMAL
SODIUM SERPL-SCNC: 137 MMOL/L (ref 136–145)
WBC # BLD AUTO: 12.6 K/UL (ref 4.3–11.1)

## 2022-04-29 PROCEDURE — G0378 HOSPITAL OBSERVATION PER HR: HCPCS

## 2022-04-29 PROCEDURE — 74011250637 HC RX REV CODE- 250/637: Performed by: OBSTETRICS & GYNECOLOGY

## 2022-04-29 PROCEDURE — 87081 CULTURE SCREEN ONLY: CPT

## 2022-04-29 PROCEDURE — 87491 CHLMYD TRACH DNA AMP PROBE: CPT

## 2022-04-29 PROCEDURE — 87653 STREP B DNA AMP PROBE: CPT

## 2022-04-29 PROCEDURE — 86900 BLOOD TYPING SEROLOGIC ABO: CPT

## 2022-04-29 PROCEDURE — 36415 COLL VENOUS BLD VENIPUNCTURE: CPT

## 2022-04-29 PROCEDURE — 80053 COMPREHEN METABOLIC PANEL: CPT

## 2022-04-29 PROCEDURE — 59025 FETAL NON-STRESS TEST: CPT

## 2022-04-29 PROCEDURE — 99285 EMERGENCY DEPT VISIT HI MDM: CPT

## 2022-04-29 PROCEDURE — 96361 HYDRATE IV INFUSION ADD-ON: CPT

## 2022-04-29 PROCEDURE — 75810000275 HC EMERGENCY DEPT VISIT NO LEVEL OF CARE

## 2022-04-29 PROCEDURE — 85025 COMPLETE CBC W/AUTO DIFF WBC: CPT

## 2022-04-29 PROCEDURE — 96360 HYDRATION IV INFUSION INIT: CPT

## 2022-04-29 PROCEDURE — 4A1HXCZ MONITORING OF PRODUCTS OF CONCEPTION, CARDIAC RATE, EXTERNAL APPROACH: ICD-10-PCS | Performed by: OBSTETRICS & GYNECOLOGY

## 2022-04-29 PROCEDURE — 82731 ASSAY OF FETAL FIBRONECTIN: CPT

## 2022-04-29 PROCEDURE — 74011250636 HC RX REV CODE- 250/636: Performed by: OBSTETRICS & GYNECOLOGY

## 2022-04-29 PROCEDURE — 87086 URINE CULTURE/COLONY COUNT: CPT

## 2022-04-29 PROCEDURE — 96372 THER/PROPH/DIAG INJ SC/IM: CPT

## 2022-04-29 PROCEDURE — 65270000029 HC RM PRIVATE

## 2022-04-29 RX ORDER — SODIUM CHLORIDE, SODIUM LACTATE, POTASSIUM CHLORIDE, CALCIUM CHLORIDE 600; 310; 30; 20 MG/100ML; MG/100ML; MG/100ML; MG/100ML
125 INJECTION, SOLUTION INTRAVENOUS CONTINUOUS
Status: DISCONTINUED | OUTPATIENT
Start: 2022-04-29 | End: 2022-04-30 | Stop reason: HOSPADM

## 2022-04-29 RX ORDER — TERBUTALINE SULFATE 1 MG/ML
0.25 INJECTION SUBCUTANEOUS
Status: COMPLETED | OUTPATIENT
Start: 2022-04-29 | End: 2022-04-29

## 2022-04-29 RX ORDER — BETAMETHASONE SODIUM PHOSPHATE AND BETAMETHASONE ACETATE 3; 3 MG/ML; MG/ML
12 INJECTION, SUSPENSION INTRA-ARTICULAR; INTRALESIONAL; INTRAMUSCULAR; SOFT TISSUE EVERY 24 HOURS
Status: COMPLETED | OUTPATIENT
Start: 2022-04-29 | End: 2022-04-30

## 2022-04-29 RX ORDER — SODIUM CHLORIDE 0.9 % (FLUSH) 0.9 %
5-40 SYRINGE (ML) INJECTION EVERY 8 HOURS
Status: DISCONTINUED | OUTPATIENT
Start: 2022-04-29 | End: 2022-04-30 | Stop reason: HOSPADM

## 2022-04-29 RX ORDER — SODIUM CHLORIDE 0.9 % (FLUSH) 0.9 %
5-40 SYRINGE (ML) INJECTION AS NEEDED
Status: DISCONTINUED | OUTPATIENT
Start: 2022-04-29 | End: 2022-04-30 | Stop reason: HOSPADM

## 2022-04-29 RX ORDER — BUTORPHANOL TARTRATE 2 MG/ML
0.5 INJECTION INTRAMUSCULAR; INTRAVENOUS
Status: COMPLETED | OUTPATIENT
Start: 2022-04-29 | End: 2022-04-30

## 2022-04-29 RX ORDER — ONDANSETRON 2 MG/ML
4 INJECTION INTRAMUSCULAR; INTRAVENOUS
Status: DISCONTINUED | OUTPATIENT
Start: 2022-04-29 | End: 2022-04-30 | Stop reason: HOSPADM

## 2022-04-29 RX ORDER — NIFEDIPINE 10 MG/1
20 CAPSULE ORAL ONCE
Status: COMPLETED | OUTPATIENT
Start: 2022-04-29 | End: 2022-04-29

## 2022-04-29 RX ORDER — ACETAMINOPHEN 500 MG
1000 TABLET ORAL
Status: COMPLETED | OUTPATIENT
Start: 2022-04-29 | End: 2022-04-29

## 2022-04-29 RX ORDER — NIFEDIPINE 10 MG/1
30 CAPSULE ORAL 3 TIMES DAILY
Status: DISCONTINUED | OUTPATIENT
Start: 2022-04-29 | End: 2022-04-30 | Stop reason: HOSPADM

## 2022-04-29 RX ADMIN — TERBUTALINE SULFATE 0.25 MG: 1 INJECTION, SOLUTION SUBCUTANEOUS at 21:52

## 2022-04-29 RX ADMIN — SODIUM CHLORIDE, SODIUM LACTATE, POTASSIUM CHLORIDE, AND CALCIUM CHLORIDE 125 ML/HR: 600; 310; 30; 20 INJECTION, SOLUTION INTRAVENOUS at 20:59

## 2022-04-29 RX ADMIN — BETAMETHASONE SODIUM PHOSPHATE AND BETAMETHASONE ACETATE 12 MG: 3; 3 INJECTION, SUSPENSION INTRA-ARTICULAR; INTRALESIONAL; INTRAMUSCULAR at 19:26

## 2022-04-29 RX ADMIN — NIFEDIPINE 20 MG: 10 CAPSULE ORAL at 19:01

## 2022-04-29 RX ADMIN — NIFEDIPINE 30 MG: 10 CAPSULE ORAL at 22:46

## 2022-04-29 RX ADMIN — ACETAMINOPHEN 1000 MG: 500 TABLET, FILM COATED ORAL at 19:25

## 2022-04-29 NOTE — H&P
[facility] History & Physical    Name: Alvarez Pineda MRN: [id number]  SSN: [id number]    YOB: 2000  Age: 23 y.o. Sex: female      Subjective: 24 yo  at 32w4d presents complaining of abdominal pain and contractions for the past several hours. Denies any VB or LOF. Endorses active fetus. No recent intercourse or trauma. Reason for Admission:  Pregnancy and Contractions    History of Present Illness: Ms. Mitra Paz is a 23 y.o.  female with an estimated gestational age of 28w1d with Estimated Date of Delivery: 22. Pregnancy has been complicated by Hashimoto's thyroiditis, hx of depression and IBS. Patient denies chest pain, fever, nausea and vomiting, right upper quadrant pain  , shortness of breath, swelling, vaginal bleeding , vaginal leaking of fluid , visual disturbances and back pain and dysuria.     OB History    Para Term  AB Living   2 0 0 0 1 0   SAB IAB Ectopic Molar Multiple Live Births   1 0 0 0 0 0      # Outcome Date GA Lbr Henry/2nd Weight Sex Delivery Anes PTL Lv   2 Current            1 SAB              Past Medical History:   Diagnosis Date    Goiter     Hashimoto's thyroiditis     Irritable bowel syndrome with constipation     Major depressive disorder, single episode with anxious distress 10/11/2018    Vitamin D deficiency      Past Surgical History:   Procedure Laterality Date    HX COLONOSCOPY      HX GYN  2020    IUD placement    HX ORTHOPAEDIC  age 1    Achilles tendon lengthening    HX TONSILLECTOMY       Social History     Occupational History    Not on file   Tobacco Use    Smoking status: Never Smoker    Smokeless tobacco: Never Used   Substance and Sexual Activity    Alcohol use: Never    Drug use: Not Currently    Sexual activity: Yes     Partners: Male     Birth control/protection: None      Family History   Problem Relation Age of Onset    COPD Mother     Thyroid Disease Mother         hypothyroidism    Asthma Mother     Thyroid Disease Father         hyperthyroidism    Asthma Brother     Ovarian Cancer Maternal Grandmother     Heart Disease Maternal Grandmother     Dementia Paternal Grandmother     Thyroid Cancer Maternal Cousin        No Known Allergies  Prior to Admission medications    Medication Sig Start Date End Date Taking? Authorizing Provider   levothyroxine (SYNTHROID) 25 mcg tablet Take one and a half tablet by oral route once daily 22   Monalisa Liu, DO   prenatal vit 91/iron/folic/dha (PRENATAL + DHA PO) Take  by mouth. Provider, Historical        Review of Systems:  A comprehensive review of systems was negative except for that written in the History of Present Illness. Objective:     Vitals:    Vitals:    22 1756 22 1801   BP: 131/86    Pulse: 85    Resp: 18    Temp: 98.4 °F (36.9 °C)    Weight:  79.8 kg (176 lb)   Height:  5' (1.524 m)      Temp (24hrs), Av.4 °F (36.9 °C), Min:98.4 °F (36.9 °C), Max:98.4 °F (36.9 °C)    BP  Min: 131/86  Max: 131/86     Physical Exam:  Constitutional: The patient appears uncomfortable, alert, oriented x 3. Cardiovascular: Heart RRR, no murmurs. Respiratory: Lungs clear, no respiratory distress  GI: Abdomen soft, nontender, no guarding  No fundal tenderness  Musculoskeletal: no cva tenderness  Upper ext: no edema, reflexes +2  Lower ext: no edema, neg joellen's, reflexes +2  Skin: no rashes or lesions  Psychiatric:Mood/ Affect: appropriate  Genitourinary: SVE: Ft/50/-2  TVUS with cervical length 4mm with funneling, dynamic with pressure and with contraction  vertex  Membranes:  Intact  Fetal Heart Rate:  Reactive  Baseline: 140 beats per minute  Variability: moderate  Accelerations: yes  Decelerations: none  Uterine contractions: regular, every 2-3 minutes       Lab/Data Review:  No results found for this or any previous visit (from the past 24 hour(s)). Assessment and Plan:      Active Problems:    Threatened  labor, third trimester (4/29/2022)    Cervical length shortened at 3-4 mm with funneling present. FFN sent. GC/chlamydia, urine culture, GBS culture ordered. Tocolysis with procardia, betamethasone for fetal lung maturity. IVF now, pain medication prn. Continue to monitor for cervical change. FWB overall reassuring with category 1 tracing. Spoke with Dr. Cecy Whelan who recommends observation overnight; I will defer to primary OB regarding MFM or NICU consultation pending clinical progression.

## 2022-04-29 NOTE — PROGRESS NOTES
Pt presents to [facility] complaining of upper abdominal pain that comes and goes. Pt states she just doesn't feel good. EFM on. Dr Hayes Rowley notified.

## 2022-04-30 VITALS
DIASTOLIC BLOOD PRESSURE: 60 MMHG | BODY MASS INDEX: 34.55 KG/M2 | TEMPERATURE: 98.3 F | HEART RATE: 82 BPM | HEIGHT: 60 IN | SYSTOLIC BLOOD PRESSURE: 115 MMHG | OXYGEN SATURATION: 98 % | WEIGHT: 176 LBS | RESPIRATION RATE: 18 BRPM

## 2022-04-30 PROCEDURE — 74011250637 HC RX REV CODE- 250/637: Performed by: OBSTETRICS & GYNECOLOGY

## 2022-04-30 PROCEDURE — 96374 THER/PROPH/DIAG INJ IV PUSH: CPT

## 2022-04-30 PROCEDURE — 74011250636 HC RX REV CODE- 250/636: Performed by: OBSTETRICS & GYNECOLOGY

## 2022-04-30 PROCEDURE — G0378 HOSPITAL OBSERVATION PER HR: HCPCS

## 2022-04-30 PROCEDURE — 99235 HOSP IP/OBS SAME DATE MOD 70: CPT | Performed by: OBSTETRICS & GYNECOLOGY

## 2022-04-30 PROCEDURE — 96376 TX/PRO/DX INJ SAME DRUG ADON: CPT

## 2022-04-30 PROCEDURE — 96375 TX/PRO/DX INJ NEW DRUG ADDON: CPT

## 2022-04-30 PROCEDURE — 96372 THER/PROPH/DIAG INJ SC/IM: CPT

## 2022-04-30 RX ORDER — NIFEDIPINE 10 MG/1
30 CAPSULE ORAL 3 TIMES DAILY
Qty: 60 CAPSULE | Refills: 1 | Status: SHIPPED | OUTPATIENT
Start: 2022-04-30 | End: 2022-05-04

## 2022-04-30 RX ORDER — BUTORPHANOL TARTRATE 2 MG/ML
1 INJECTION INTRAMUSCULAR; INTRAVENOUS ONCE
Status: COMPLETED | OUTPATIENT
Start: 2022-04-30 | End: 2022-04-30

## 2022-04-30 RX ORDER — ACETAMINOPHEN 500 MG
1000 TABLET ORAL
Status: DISCONTINUED | OUTPATIENT
Start: 2022-04-30 | End: 2022-04-30 | Stop reason: HOSPADM

## 2022-04-30 RX ADMIN — BUTORPHANOL TARTRATE 1 MG: 2 INJECTION, SOLUTION INTRAMUSCULAR; INTRAVENOUS at 02:40

## 2022-04-30 RX ADMIN — ACETAMINOPHEN 1000 MG: 500 TABLET, FILM COATED ORAL at 14:26

## 2022-04-30 RX ADMIN — BETAMETHASONE SODIUM PHOSPHATE AND BETAMETHASONE ACETATE 12 MG: 3; 3 INJECTION, SUSPENSION INTRA-ARTICULAR; INTRALESIONAL; INTRAMUSCULAR at 17:10

## 2022-04-30 RX ADMIN — ONDANSETRON 4 MG: 2 INJECTION INTRAMUSCULAR; INTRAVENOUS at 02:50

## 2022-04-30 RX ADMIN — BUTORPHANOL TARTRATE 0.5 MG: 2 INJECTION, SOLUTION INTRAMUSCULAR; INTRAVENOUS at 00:29

## 2022-04-30 RX ADMIN — NIFEDIPINE 30 MG: 10 CAPSULE ORAL at 06:20

## 2022-04-30 RX ADMIN — ONDANSETRON 4 MG: 2 INJECTION INTRAMUSCULAR; INTRAVENOUS at 02:48

## 2022-04-30 RX ADMIN — NIFEDIPINE 30 MG: 10 CAPSULE ORAL at 14:26

## 2022-04-30 RX ADMIN — ACETAMINOPHEN 1000 MG: 500 TABLET, FILM COATED ORAL at 08:32

## 2022-04-30 RX ADMIN — SODIUM CHLORIDE, SODIUM LACTATE, POTASSIUM CHLORIDE, AND CALCIUM CHLORIDE 125 ML/HR: 600; 310; 30; 20 INJECTION, SOLUTION INTRAVENOUS at 04:39

## 2022-04-30 NOTE — PROGRESS NOTES
Report received from Ezio, [facility]. Pt for admission to 436 for observation for PTL.

## 2022-04-30 NOTE — DISCHARGE INSTRUCTIONS
Patient Education        Ramona Danger Contractions: Care Instructions  Your Care Instructions     Sullivan Figueroa contractions prepare your uterus for labor. Think of them as a \"warm-up\" exercise that your body does. You may begin to feel them between the 28th and 30th weeks of your pregnancy. But they start as early as the 20th week. Sullivan Figueroa contractions usually occur more often during the ninth month. They may go away when you are active and return when you rest. These contractions are like mild contractions of true labor, but they occur less often. (You feel fewer than 8 in an hour.) They don't cause your cervix to open. It may be hard for you to tell the difference between Laurina Danger contractions and true labor, especially in your first pregnancy. Follow-up care is a key part of your treatment and safety. Be sure to make and go to all appointments, and call your doctor if you are having problems. It's also a good idea to know your test results and keep a list of the medicines you take. How can you care for yourself at home? · Try a warm bath to help relieve muscle tension and reduce pain. · Change positions every 30 minutes. Take breaks if you must sit for a long time. Get up and walk around. · Drink plenty of water. · Taking short walks may help you feel better. Your doctor needs to check any contractions that are getting stronger or closer together. Where can you learn more? Go to http://www.gray.com/  Enter Z402 in the search box to learn more about \"Sullivan Figueroa Contractions: Care Instructions. \"  Current as of: June 16, 2021               Content Version: 13.2  © 1429-7189 [employer]. Care instructions adapted under license by [employer] (which disclaims liability or warranty for this information).  If you have questions about a medical condition or this instruction, always ask your healthcare professional. Eleonora Christian disclaims any warranty or liability for your use of this information.

## 2022-04-30 NOTE — PROGRESS NOTES
Pt ctxing and states feeling increased pain with contractions. Orders for Terbutaline once per Dr. Adamaris Aguiar.

## 2022-04-30 NOTE — PROGRESS NOTES
Celestone given IM in left upper outer quadrant. Pt tolerated well. Discharge instructions discussed with patient and understanding verbalized. IV removed with cath tip intact. EFM discontinued. Pt eating dinner and will call out when she is ready to leave.

## 2022-04-30 NOTE — DISCHARGE SUMMARY
[address] [facility] Discharge Summary     Patient ID:  Donna Nguyen  [id number]  53 y.o.  2000    Admit date: 2022    Discharge date and time: No discharge date for patient encounter. Admitting Physician: Itzel Shields MD     Discharge Physician: Toula Cooks, M.D. Admission Diagnoses: Threatened  labor, third trimester [O47.03]    Problem List: Hospital - Active Problems:    Threatened  labor, third trimester (2022)     ; Other -   Patient Active Problem List   Diagnosis Code    Hashimoto's thyroiditis E06.3    Vitamin D deficiency E55.9    Family history of thyroid cancer Z80.8    Irritable bowel syndrome with constipation K58.1    Pregnancy Z34.90    Anxiety and depression F41.9, F32. A    Subclinical hypothyroidism E03.8    Threatened  labor, third trimester O47.03        Discharge Diagnoses: Threatened  labor, third trimester [O47.03]    Hospital Course: Donna Nguyen had unremarkeable progressive recovery. , Eating, ambulating, and voiding in a routine manner. and Hospital course complicated by PTL    Significant Diagnostic Studies:   Recent Results (from the past 24 hour(s))   CBC WITH AUTOMATED DIFF    Collection Time: 22  7:09 PM   Result Value Ref Range    WBC 12.6 (H) 4.3 - 11.1 K/uL    RBC 3.96 (L) 4.05 - 5.2 M/uL    HGB 10.5 (L) 11.7 - 15.4 g/dL    HCT 32.3 (L) 35.8 - 46.3 %    MCV 81.6 79.6 - 97.8 FL    MCH 26.5 26.1 - 32.9 PG    MCHC 32.5 31.4 - 35.0 g/dL    RDW 12.9 11.9 - 14.6 %    PLATELET 215 165 - 409 K/uL    MPV 11.3 9.4 - 12.3 FL    ABSOLUTE NRBC 0.00 0.0 - 0.2 K/uL    DF AUTOMATED      NEUTROPHILS 75 43 - 78 %    LYMPHOCYTES 13 13 - 44 %    MONOCYTES 8 4.0 - 12.0 %    EOSINOPHILS 1 0.5 - 7.8 %    BASOPHILS 1 0.0 - 2.0 %    IMMATURE GRANULOCYTES 3 0.0 - 5.0 %    ABS. NEUTROPHILS 9.4 (H) 1.7 - 8.2 K/UL    ABS. LYMPHOCYTES 1.6 0.5 - 4.6 K/UL    ABS. MONOCYTES 1.0 0.1 - 1.3 K/UL    ABS. EOSINOPHILS 0.2 0.0 - 0.8 K/UL    ABS. BASOPHILS 0.1 0.0 - 0.2 K/UL    ABS. IMM. GRANS. 0.4 0.0 - 0.5 K/UL   METABOLIC PANEL, COMPREHENSIVE    Collection Time: 04/29/22  7:09 PM   Result Value Ref Range    Sodium 137 136 - 145 mmol/L    Potassium 3.7 3.5 - 5.1 mmol/L    Chloride 105 98 - 107 mmol/L    CO2 23 21 - 32 mmol/L    Anion gap 9 7 - 16 mmol/L    Glucose 84 65 - 100 mg/dL    BUN 7 6 - 23 MG/DL    Creatinine 0.44 (L) 0.6 - 1.0 MG/DL    GFR est AA >60 >60 ml/min/1.73m2    GFR est non-AA >60 >60 ml/min/1.73m2    Calcium 9.3 8.3 - 10.4 MG/DL    Bilirubin, total 0.3 0.2 - 1.1 MG/DL    ALT (SGPT) 12 12 - 65 U/L    AST (SGOT) 15 15 - 37 U/L    Alk. phosphatase 93 50 - 136 U/L    Protein, total 6.9 6.3 - 8.2 g/dL    Albumin 2.4 (L) 3.5 - 5.0 g/dL    Globulin 4.5 (H) 2.3 - 3.5 g/dL    A-G Ratio 0.5 (L) 1.2 - 3.5     CULTURE, URINE    Collection Time: 04/29/22  7:11 PM    Specimen: Clean catch; Urine   Result Value Ref Range    Special Requests: NO SPECIAL REQUESTS      Culture result:        NO GROWTH AFTER SHORT PERIOD OF INCUBATION. FURTHER RESULTS TO FOLLOW AFTER OVERNIGHT INCUBATION. TYPE & SCREEN    Collection Time: 04/29/22  7:11 PM   Result Value Ref Range    Crossmatch Expiration 05/02/2022,2359     ABO/Rh(D) A POSITIVE     Antibody screen NEG    FETAL FIBRONECTIN    Collection Time: 04/29/22  7:11 PM   Result Value Ref Range    Fetal fibronectin Negative NEG     GRP B STREP SCRN BY PCR    Collection Time: 04/29/22  7:12 PM    Specimen: VAGINAL/RECTAL   Result Value Ref Range    Special Requests: NO SPECIAL REQUESTS      Culture result:        NEGATIVE: GBS target nucleic acid is not detected. Presumed not colonized for GBS. CULTURE, URINE    Collection Time: 04/29/22  8:45 PM    Specimen: Clean catch; Urine    URINE   Result Value Ref Range    Special Requests: NO SPECIAL REQUESTS      Culture result:        NO GROWTH AFTER SHORT PERIOD OF INCUBATION. FURTHER RESULTS TO FOLLOW AFTER OVERNIGHT INCUBATION.        Procedures: tocolysis    Discharge Exam:  Visit Vitals  /76   Pulse 92   Temp 97.9 °F (36.6 °C)   Resp 18   Ht 5' (1.524 m)   Wt 176 lb (79.8 kg)   LMP 09/13/2021   SpO2 98%   BMI 34.37 kg/m²        Heart: regular rate and rhythm, S1, S2 normal, no murmur, click, rub or gallop  Lungs:clear to auscultation bilaterally  Extremities: normal, atraumatic, no cyanosis or edema. No DVT      Patient Instructions:   Current Discharge Medication List      START taking these medications    Details   NIFEdipine (PROCARDIA) 10 mg capsule Take 3 Capsules by mouth three (3) times daily. Indications: early labor  Qty: 60 Capsule, Refills: 1         CONTINUE these medications which have NOT CHANGED    Details   levothyroxine (SYNTHROID) 25 mcg tablet Take one and a half tablet by oral route once daily  Qty: 45 Tablet, Refills: 4      prenatal vit 91/iron/folic/dha (PRENATAL + DHA PO) Take  by mouth. Activity: physical activity is restricted per discharge instructions  Diet: resume normal diet      Follow-up with Mateo in 2 days.     Signed:  Marguerite Catalan MD  4/30/2022  8:57 AM

## 2022-04-30 NOTE — PROGRESS NOTES
Ante Partum High Risk Pregnancy Note  Patient: Walt Barney  MRN: [id number]    Patient admitted for  labor states she does not  have  right upper quadrant pain  , vaginal bleeding , swelling and vaginal leaking of fluid . LOS:  1  Vitals: Temp (24hrs), Av.2 °F (36.8 °C), Min:97.9 °F (36.6 °C), Max:98.4 °F (36.9 °C)   Patient Vitals for the past 24 hrs:   BP   22 0812 123/76   22 0619 115/67   22 2037 125/77   22 1756 131/86       I&O:   No intake/output data recorded.  1901 -  0700  In:  [I.V.:]  Out: -     Exam:  Patient without distress. Abdomen: soft, non-tender               Fundus: soft and non tender               Fundal Height: 33 cm               Right Upper Quadrant: non-tender               Perineum: No sign of blood or amniotic fluid               Lower Extremities: No swelling               Patellar Reflexes: 1+ bilaterally               Clonus: absent               Fetal Monitoring:  No decels   Uterine Activity: Frequency: Every 10-15 minutes and Intensity: mild    Dilation: 1 cm     Effacement: 50%    Station: -3               NST:  reactive           Labs:   Recent Results (from the past 24 hour(s))   CBC WITH AUTOMATED DIFF    Collection Time: 22  7:09 PM   Result Value Ref Range    WBC 12.6 (H) 4.3 - 11.1 K/uL    RBC 3.96 (L) 4.05 - 5.2 M/uL    HGB 10.5 (L) 11.7 - 15.4 g/dL    HCT 32.3 (L) 35.8 - 46.3 %    MCV 81.6 79.6 - 97.8 FL    MCH 26.5 26.1 - 32.9 PG    MCHC 32.5 31.4 - 35.0 g/dL    RDW 12.9 11.9 - 14.6 %    PLATELET 863 120 - 716 K/uL    MPV 11.3 9.4 - 12.3 FL    ABSOLUTE NRBC 0.00 0.0 - 0.2 K/uL    DF AUTOMATED      NEUTROPHILS 75 43 - 78 %    LYMPHOCYTES 13 13 - 44 %    MONOCYTES 8 4.0 - 12.0 %    EOSINOPHILS 1 0.5 - 7.8 %    BASOPHILS 1 0.0 - 2.0 %    IMMATURE GRANULOCYTES 3 0.0 - 5.0 %    ABS. NEUTROPHILS 9.4 (H) 1.7 - 8.2 K/UL    ABS. LYMPHOCYTES 1.6 0.5 - 4.6 K/UL    ABS.  MONOCYTES 1.0 0.1 - 1.3 K/UL    ABS. EOSINOPHILS 0.2 0.0 - 0.8 K/UL    ABS. BASOPHILS 0.1 0.0 - 0.2 K/UL    ABS. IMM. GRANS. 0.4 0.0 - 0.5 K/UL   METABOLIC PANEL, COMPREHENSIVE    Collection Time: 04/29/22  7:09 PM   Result Value Ref Range    Sodium 137 136 - 145 mmol/L    Potassium 3.7 3.5 - 5.1 mmol/L    Chloride 105 98 - 107 mmol/L    CO2 23 21 - 32 mmol/L    Anion gap 9 7 - 16 mmol/L    Glucose 84 65 - 100 mg/dL    BUN 7 6 - 23 MG/DL    Creatinine 0.44 (L) 0.6 - 1.0 MG/DL    GFR est AA >60 >60 ml/min/1.73m2    GFR est non-AA >60 >60 ml/min/1.73m2    Calcium 9.3 8.3 - 10.4 MG/DL    Bilirubin, total 0.3 0.2 - 1.1 MG/DL    ALT (SGPT) 12 12 - 65 U/L    AST (SGOT) 15 15 - 37 U/L    Alk. phosphatase 93 50 - 136 U/L    Protein, total 6.9 6.3 - 8.2 g/dL    Albumin 2.4 (L) 3.5 - 5.0 g/dL    Globulin 4.5 (H) 2.3 - 3.5 g/dL    A-G Ratio 0.5 (L) 1.2 - 3.5     CULTURE, URINE    Collection Time: 04/29/22  7:11 PM    Specimen: Clean catch; Urine   Result Value Ref Range    Special Requests: NO SPECIAL REQUESTS      Culture result:        NO GROWTH AFTER SHORT PERIOD OF INCUBATION. FURTHER RESULTS TO FOLLOW AFTER OVERNIGHT INCUBATION. TYPE & SCREEN    Collection Time: 04/29/22  7:11 PM   Result Value Ref Range    Crossmatch Expiration 05/02/2022,2359     ABO/Rh(D) A POSITIVE     Antibody screen NEG    FETAL FIBRONECTIN    Collection Time: 04/29/22  7:11 PM   Result Value Ref Range    Fetal fibronectin Negative NEG     GRP B STREP SCRN BY PCR    Collection Time: 04/29/22  7:12 PM    Specimen: VAGINAL/RECTAL   Result Value Ref Range    Special Requests: NO SPECIAL REQUESTS      Culture result:        NEGATIVE: GBS target nucleic acid is not detected. Presumed not colonized for GBS. CULTURE, URINE    Collection Time: 04/29/22  8:45 PM    Specimen: Clean catch; Urine    URINE   Result Value Ref Range    Special Requests: NO SPECIAL REQUESTS      Culture result:        NO GROWTH AFTER SHORT PERIOD OF INCUBATION.  FURTHER RESULTS TO FOLLOW AFTER OVERNIGHT INCUBATION. Assessment and Plan: Active Problems:    Threatened  labor, third trimester (2022)        Ffn was negative   Labor:  48 hour course of steroids to promote fetal lung maturity. Start oral Procardia   Start Magnesium sulfate for tocolysis if other tocolytics fail. Contractions much improved since terb shot and rest. Will receive steroid shot #2 at 6pm, then can go home on the procardia po  Then call office 9am on Monday; will need follow up cervical length. To be on bedrest with minimal activity until seen in office.   Precautions given

## 2022-04-30 NOTE — PROGRESS NOTES
Pt c/o headache and request tylenol. Dr Nury Bellamy made aware. Orders received. Assessment complete.

## 2022-05-01 ENCOUNTER — HOSPITAL ENCOUNTER (INPATIENT)
Age: 22
LOS: 2 days | Discharge: HOME | DRG: 831 | End: 2022-05-04
Attending: OBSTETRICS & GYNECOLOGY | Admitting: OBSTETRICS & GYNECOLOGY
Payer: OTHER GOVERNMENT

## 2022-05-01 ENCOUNTER — APPOINTMENT (OUTPATIENT)
Dept: GENERAL RADIOLOGY | Age: 22
DRG: 831 | End: 2022-05-01
Attending: OBSTETRICS & GYNECOLOGY
Payer: OTHER GOVERNMENT

## 2022-05-01 DIAGNOSIS — J69.0 ASPIRATION PNEUMONIA OF LEFT LOWER LOBE DUE TO REGURGITATED FOOD (HCC): Primary | ICD-10-CM

## 2022-05-01 DIAGNOSIS — O47.03 THREATENED PRETERM LABOR, THIRD TRIMESTER: ICD-10-CM

## 2022-05-01 DIAGNOSIS — O99.891 SHORTNESS OF BREATH DURING PREGNANCY: ICD-10-CM

## 2022-05-01 DIAGNOSIS — R06.02 SHORTNESS OF BREATH DURING PREGNANCY: ICD-10-CM

## 2022-05-01 DIAGNOSIS — J69.0 ASPIRATION PNEUMONIA OF BOTH LOWER LOBES DUE TO REGURGITATED FOOD (HCC): ICD-10-CM

## 2022-05-01 DIAGNOSIS — R10.9 ABDOMINAL PAIN DURING PREGNANCY IN THIRD TRIMESTER: ICD-10-CM

## 2022-05-01 DIAGNOSIS — Z3A.33 33 WEEKS GESTATION OF PREGNANCY: ICD-10-CM

## 2022-05-01 DIAGNOSIS — O26.893 ABDOMINAL PAIN DURING PREGNANCY IN THIRD TRIMESTER: ICD-10-CM

## 2022-05-01 DIAGNOSIS — A40.8: ICD-10-CM

## 2022-05-01 DIAGNOSIS — J96.01 ACUTE RESPIRATORY FAILURE WITH HYPOXIA (HCC): ICD-10-CM

## 2022-05-01 LAB
ALBUMIN SERPL-MCNC: 2.6 G/DL (ref 3.5–5)
ALBUMIN/GLOB SERPL: 0.6 {RATIO} (ref 1.2–3.5)
ALP SERPL-CCNC: 81 U/L (ref 50–130)
ALT SERPL-CCNC: 15 U/L (ref 12–65)
AST SERPL-CCNC: 33 U/L (ref 15–37)
BILIRUB DIRECT SERPL-MCNC: 0.1 MG/DL
BILIRUB SERPL-MCNC: 0.2 MG/DL (ref 0.2–1.1)
BUN SERPL-MCNC: 8 MG/DL (ref 6–23)
CREAT SERPL-MCNC: 0.69 MG/DL (ref 0.6–1)
CREAT UR-MCNC: 55 MG/DL
ERYTHROCYTE [DISTWIDTH] IN BLOOD BY AUTOMATED COUNT: 13.1 % (ref 11.9–14.6)
GLOBULIN SER CALC-MCNC: 4.2 G/DL (ref 2.3–3.5)
GLUCOSE, GLUUPC: NEGATIVE
HCT VFR BLD AUTO: 29.5 % (ref 35.8–46.3)
HGB BLD-MCNC: 9.4 G/DL (ref 11.7–15.4)
KETONES UR-MCNC: NEGATIVE MG/DL
MCH RBC QN AUTO: 26.8 PG (ref 26.1–32.9)
MCHC RBC AUTO-ENTMCNC: 31.9 G/DL (ref 31.4–35)
MCV RBC AUTO: 84 FL (ref 79.6–97.8)
NRBC # BLD: 0.04 K/UL (ref 0–0.2)
PLATELET # BLD AUTO: 256 K/UL (ref 150–450)
PMV BLD AUTO: 11.5 FL (ref 9.4–12.3)
PROT SERPL-MCNC: 6.8 G/DL (ref 6.3–8.2)
PROT UR QL: NEGATIVE
PROT UR-MCNC: 12 MG/DL
PROT/CREAT UR-RTO: 0.2
RBC # BLD AUTO: 3.51 M/UL (ref 4.05–5.2)
T4 FREE SERPL-MCNC: 0.8 NG/DL (ref 0.78–1.46)
TSH SERPL DL<=0.005 MIU/L-ACNC: 3.71 UIU/ML
WBC # BLD AUTO: 16.2 K/UL (ref 4.3–11.1)

## 2022-05-01 PROCEDURE — 81002 URINALYSIS NONAUTO W/O SCOPE: CPT | Performed by: OBSTETRICS & GYNECOLOGY

## 2022-05-01 PROCEDURE — 85027 COMPLETE CBC AUTOMATED: CPT

## 2022-05-01 PROCEDURE — 84520 ASSAY OF UREA NITROGEN: CPT

## 2022-05-01 PROCEDURE — G0378 HOSPITAL OBSERVATION PER HR: HCPCS

## 2022-05-01 PROCEDURE — 84439 ASSAY OF FREE THYROXINE: CPT

## 2022-05-01 PROCEDURE — 84156 ASSAY OF PROTEIN URINE: CPT

## 2022-05-01 PROCEDURE — 84443 ASSAY THYROID STIM HORMONE: CPT

## 2022-05-01 PROCEDURE — 71046 X-RAY EXAM CHEST 2 VIEWS: CPT

## 2022-05-01 PROCEDURE — 80076 HEPATIC FUNCTION PANEL: CPT

## 2022-05-01 PROCEDURE — 99284 EMERGENCY DEPT VISIT MOD MDM: CPT

## 2022-05-01 PROCEDURE — 82565 ASSAY OF CREATININE: CPT

## 2022-05-01 PROCEDURE — 74011250637 HC RX REV CODE- 250/637: Performed by: OBSTETRICS & GYNECOLOGY

## 2022-05-01 RX ORDER — ZOLPIDEM TARTRATE 5 MG/1
5 TABLET ORAL
Status: DISCONTINUED | OUTPATIENT
Start: 2022-05-01 | End: 2022-05-02

## 2022-05-01 RX ORDER — SODIUM CHLORIDE, SODIUM LACTATE, POTASSIUM CHLORIDE, CALCIUM CHLORIDE 600; 310; 30; 20 MG/100ML; MG/100ML; MG/100ML; MG/100ML
125 INJECTION, SOLUTION INTRAVENOUS CONTINUOUS
Status: DISCONTINUED | OUTPATIENT
Start: 2022-05-01 | End: 2022-05-01

## 2022-05-01 RX ORDER — CYCLOBENZAPRINE HCL 10 MG
5 TABLET ORAL
Status: COMPLETED | OUTPATIENT
Start: 2022-05-01 | End: 2022-05-01

## 2022-05-01 RX ORDER — POLYETHYLENE GLYCOL 3350 17 G/17G
17 POWDER, FOR SOLUTION ORAL DAILY
Status: DISCONTINUED | OUTPATIENT
Start: 2022-05-01 | End: 2022-05-02

## 2022-05-01 RX ORDER — FUROSEMIDE 10 MG/ML
20 INJECTION INTRAMUSCULAR; INTRAVENOUS ONCE
Status: DISCONTINUED | OUTPATIENT
Start: 2022-05-01 | End: 2022-05-01

## 2022-05-01 RX ORDER — SODIUM CHLORIDE 0.9 % (FLUSH) 0.9 %
5-40 SYRINGE (ML) INJECTION AS NEEDED
Status: DISCONTINUED | OUTPATIENT
Start: 2022-05-01 | End: 2022-05-04 | Stop reason: HOSPADM

## 2022-05-01 RX ORDER — DOCUSATE SODIUM 100 MG/1
100 CAPSULE, LIQUID FILLED ORAL 2 TIMES DAILY
Status: DISCONTINUED | OUTPATIENT
Start: 2022-05-01 | End: 2022-05-02

## 2022-05-01 RX ORDER — BUTORPHANOL TARTRATE 2 MG/ML
0.5 INJECTION INTRAMUSCULAR; INTRAVENOUS ONCE
Status: DISCONTINUED | OUTPATIENT
Start: 2022-05-01 | End: 2022-05-01

## 2022-05-01 RX ORDER — ACETAMINOPHEN 325 MG/1
650 TABLET ORAL
Status: DISCONTINUED | OUTPATIENT
Start: 2022-05-01 | End: 2022-05-02

## 2022-05-01 RX ORDER — BUTORPHANOL TARTRATE 2 MG/ML
1 INJECTION INTRAMUSCULAR; INTRAVENOUS
Status: DISCONTINUED | OUTPATIENT
Start: 2022-05-01 | End: 2022-05-02

## 2022-05-01 RX ORDER — SODIUM CHLORIDE 0.9 % (FLUSH) 0.9 %
5-40 SYRINGE (ML) INJECTION EVERY 8 HOURS
Status: DISCONTINUED | OUTPATIENT
Start: 2022-05-01 | End: 2022-05-04 | Stop reason: HOSPADM

## 2022-05-01 RX ADMIN — CYCLOBENZAPRINE 5 MG: 10 TABLET, FILM COATED ORAL at 19:58

## 2022-05-01 RX ADMIN — ACETAMINOPHEN 325MG 650 MG: 325 TABLET ORAL at 21:23

## 2022-05-01 RX ADMIN — DOCUSATE SODIUM 100 MG: 100 CAPSULE ORAL at 21:23

## 2022-05-01 RX ADMIN — POLYETHYLENE GLYCOL 3350 17 G: 17 POWDER, FOR SOLUTION ORAL at 20:00

## 2022-05-01 NOTE — PROGRESS NOTES
Diagnosis: Multiple myeloma    Regimen: IVIG  Cycle/Day: C13D1    Dr. Jackie Mckeon is supervising clinician today. ECOG:   ECOG [11/12/20 0901]   ECOG Performance Status 0       Nursing Assessment:   A focused nursing assessment addressing the toxicity of chemotherapy was performed and the patient reports the following:  Nausea: NO  Vomiting: NO  Fever: NO  Chills: NO  Other signs of infection: NO  Bleeding: NO  Mucositis: NO  Diarrhea: YES  Constipation: NO  Anorexia: NO  Dysuria: NO  Urinary Bleeding: NO  Cough: NO  Shortness of Breath: NO  Fatigue/Weakness: YES  Numbness/Tingling: YES, toes - ongoing  Other Neuropathies: NO  Edema: NO  Rash: NO  Hand/Foot Syndrome: NO  Anxiety/Depression/Insomnia: NO  Pain: NO    Revlimid  Oral Chemotherapy: Yes, Patient is taking medication as prescribed. Labs drawn and sent; pt up to bathroom for urine collection.

## 2022-05-01 NOTE — H&P
TONY History & Physical    Name: Maximiliano Chi MRN: [id number]  SSN: [id number]    YOB: 2000  Age: 23 y.o. Sex: female      Subjective:  24 yo  at [facility] presents complaining of abdominal pain and contractions for the past several hours. Denies any VB or LOF. She was in the hospital for threatened PTL and was dc after her 2nd dose of celestone 22 dinnertime. She states last night she woke from sleep with SOB, could not get comfortable then propped herself up with a pillow and sat in a chair to sleep. She also reports some chest pressure, worse with lying down. She has a HA that has been present since she began taking nifedipine. Denies any visual changes, N/V or RUQ pain. Previous Bps always low/normal.       Reason for Admission:  Pregnancy and contractions, SOB    History of Present Illness: Ms. Jerry Blanca is a 23 y.o.  female with an estimated gestational age of 7000 [facility] with Estimated Date of Delivery: 22. Pregnancy has been complicated by threatened  labor. Patient denies fever, nausea and vomiting, right upper quadrant pain  , vaginal bleeding , vaginal leaking of fluid , visual disturbances and back pain and dysuria. She has noted lower extremity swelling and swelling in her face and hands. She states FM has been decreased. Began feeling cramping around 1400, which got worse and so she came in. SOB has been present anytime she lies flat since midnight.     OB History    Para Term  AB Living   2 0 0 0 1 0   SAB IAB Ectopic Molar Multiple Live Births   1 0 0 0 0 0      # Outcome Date GA Lbr Henry/2nd Weight Sex Delivery Anes PTL Lv   2 Current            1 SAB              Past Medical History:   Diagnosis Date    Goiter     Hashimoto's thyroiditis     Irritable bowel syndrome with constipation     Major depressive disorder, single episode with anxious distress 10/11/2018    Vitamin D deficiency      Past Surgical History:   Procedure Laterality Date    HX COLONOSCOPY      HX GYN  2020    IUD placement    HX ORTHOPAEDIC  age 1    Achilles tendon lengthening    HX TONSILLECTOMY       Social History     Occupational History    Not on file   Tobacco Use    Smoking status: Never Smoker    Smokeless tobacco: Never Used   Substance and Sexual Activity    Alcohol use: Never    Drug use: Not Currently    Sexual activity: Yes     Partners: Male     Birth control/protection: None      Family History   Problem Relation Age of Onset    COPD Mother     Thyroid Disease Mother         hypothyroidism    Asthma Mother     Thyroid Disease Father         hyperthyroidism    Asthma Brother     Ovarian Cancer Maternal Grandmother     Heart Disease Maternal Grandmother     Dementia Paternal Grandmother     Thyroid Cancer Maternal Cousin        No Known Allergies  Prior to Admission medications    Medication Sig Start Date End Date Taking? Authorizing Provider   NIFEdipine (PROCARDIA) 10 mg capsule Take 3 Capsules by mouth three (3) times daily. Indications: early labor 22   Shay Jara MD   levothyroxine (SYNTHROID) 25 mcg tablet Take one and a half tablet by oral route once daily 22   Ankit Oconnor, DO   prenatal vit 91/iron/folic/dha (PRENATAL + DHA PO) Take  by mouth. Provider, Historical        Review of Systems:  A comprehensive review of systems was negative except for that written in the History of Present Illness. Objective:     Vitals:    Vitals:    22 1832 22 1842 22 1852 22 1924   BP: 116/61 119/65 127/73 (!) 118/59   Pulse: 81 80 73 88   Temp:       SpO2:          Temp (24hrs), Av.4 °F (36.9 °C), Min:98.4 °F (36.9 °C), Max:98.4 °F (36.9 °C)    BP  Min: 107/59  Max: 148/78     Physical Exam:  Constitutional: The patient appears well, alert, oriented x 3. Cardiovascular: Heart RRR, no murmurs.    Respiratory: Lungs clear, no respiratory distress  GI: Abdomen soft, nontender, no guarding  No fundal tenderness  Musculoskeletal: no cva tenderness  Upper ext: trace to 1+ edema, reflexes +2  Lower ext: 2+ pitting edema pretibial, neg joellen's, reflexes +2  Skin: no rashes or lesions  Psychiatric:Mood/ Affect: appropriate  Genitourinary: SVE: BT/89/-7 cephalic  Membranes:  Intact  Fetal Heart Rate:  Reactive  Baseline: 145 beats per minute  Variability: moderate  Accelerations: yes  Decelerations: none  Uterine contractions: none       Lab/Data Review:  Recent Results (from the past 24 hour(s))   CBC W/O DIFF    Collection Time: 05/01/22  6:16 PM   Result Value Ref Range    WBC 16.2 (H) 4.3 - 11.1 K/uL    RBC 3.51 (L) 4.05 - 5.2 M/uL    HGB 9.4 (L) 11.7 - 15.4 g/dL    HCT 29.5 (L) 35.8 - 46.3 %    MCV 84.0 79.6 - 97.8 FL    MCH 26.8 26.1 - 32.9 PG    MCHC 31.9 31.4 - 35.0 g/dL    RDW 13.1 11.9 - 14.6 %    PLATELET 238 292 - 338 K/uL    MPV 11.5 9.4 - 12.3 FL    ABSOLUTE NRBC 0.04 0.0 - 0.2 K/uL   BUN    Collection Time: 05/01/22  6:16 PM   Result Value Ref Range    BUN 8 6 - 23 MG/DL   CREATININE    Collection Time: 05/01/22  6:16 PM   Result Value Ref Range    Creatinine 0.69 0.6 - 1.0 MG/DL   HEPATIC FUNCTION PANEL    Collection Time: 05/01/22  6:16 PM   Result Value Ref Range    Protein, total 6.8 6.3 - 8.2 g/dL    Albumin 2.6 (L) 3.5 - 5.0 g/dL    Globulin 4.2 (H) 2.3 - 3.5 g/dL    A-G Ratio 0.6 (L) 1.2 - 3.5      Bilirubin, total 0.2 0.2 - 1.1 MG/DL    Bilirubin, direct 0.1 <0.4 MG/DL    Alk. phosphatase 81 50 - 130 U/L    AST (SGOT) 33 15 - 37 U/L    ALT (SGPT) 15 12 - 65 U/L   PROTEIN/CREATININE RATIO, URINE    Collection Time: 05/01/22  6:16 PM   Result Value Ref Range    Protein, urine random 12 mg/dL    Creatinine, urine 55.00 mg/dL    Protein/Creat.  urine Ratio 0.2     TSH 3RD GENERATION    Collection Time: 05/01/22  6:16 PM   Result Value Ref Range    TSH 3.710 uIU/mL   T4, FREE    Collection Time: 05/01/22  6:16 PM   Result Value Ref Range    T4, Free 0.8 0.78 - 1.46 NG/DL   POC URINE DIPSTICK MANUAL    Collection Time: 22  6:30 PM   Result Value Ref Range    Protein (POC) Negative Negative    Glucose, urine (POC) Negative Negative    Ketones (POC) Negative Negative     EKG normal sinus rhythm    Assessment and Plan: Active Problems:    Threatened  labor, third trimester (2022)      Abdominal pain during pregnancy in third trimester (2022)      Shortness of breath during pregnancy (2022)    SVE unchanged on initial evaluation and recheck 1 hour later, patient is s/p tocolysis with procardia course through steroid window, last dose procardia at home at 1700. Reviewed assessment and plan for observation with M, Dr. Ricardo Gan. Will stop scheduled procardia per Dr. Ricardo Gan. CXR without acute abnormality, no pulmonary edema. Labs as noted, no evidence of preeclampsia at this time. Will begin 24 hour urine collection for total protein per Dr. Nury Bellamy based on initial elevated BPs. FWB overall reassuring - category 1 tracing, s/p BMZ course on  and 22. Flexeril for back pain. Tylenol for HA. Miralaxx and colace for constipation. Observation overnight, monitor closely for cervical change, if cervix changes would start magnesium per [facility] recommendation. GBS negative on 22. Subclinical hypothyroidism - TSH and FT4 normal.  Hx of IBS with constipation - start miralaxx and colace, enema or suppository prn.

## 2022-05-01 NOTE — PROGRESS NOTES
Pt here in triage with complaints of back pain, lower abdominal pain and cramping; severe swelling in legs and feet and headache.  Dr Dane Goodwin at bedside

## 2022-05-02 ENCOUNTER — APPOINTMENT (OUTPATIENT)
Dept: CT IMAGING | Age: 22
DRG: 831 | End: 2022-05-02
Attending: OBSTETRICS & GYNECOLOGY
Payer: OTHER GOVERNMENT

## 2022-05-02 PROBLEM — A40.8: Status: ACTIVE | Noted: 2022-05-02

## 2022-05-02 PROBLEM — J96.01 ACUTE RESPIRATORY FAILURE WITH HYPOXIA (HCC): Status: ACTIVE | Noted: 2022-05-02

## 2022-05-02 PROBLEM — J69.0 ASPIRATION PNEUMONIA (HCC): Status: ACTIVE | Noted: 2022-05-02

## 2022-05-02 PROBLEM — J69.0 ASPIRATION PNEUMONIA DUE TO FOOD (REGURGITATED) (HCC): Status: ACTIVE | Noted: 2022-05-02

## 2022-05-02 PROBLEM — R09.02 HYPOXIA: Status: ACTIVE | Noted: 2022-05-02

## 2022-05-02 LAB
ABO + RH BLD: NORMAL
ABO + RH BLD: NORMAL
ALBUMIN SERPL-MCNC: 2.4 G/DL (ref 3.5–5)
ALBUMIN/GLOB SERPL: 0.6 {RATIO} (ref 1.2–3.5)
ALP SERPL-CCNC: 88 U/L (ref 50–130)
ALT SERPL-CCNC: 16 U/L (ref 12–65)
ANION GAP SERPL CALC-SCNC: 9 MMOL/L (ref 7–16)
APPEARANCE UR: CLEAR
AST SERPL-CCNC: 25 U/L (ref 15–37)
ATRIAL RATE: 77 BPM
ATRIAL RATE: 88 BPM
BACTERIA SPEC CULT: NORMAL
BACTERIA SPEC CULT: NORMAL
BACTERIA URNS QL MICRO: 0 /HPF
BASOPHILS # BLD: 0.1 K/UL (ref 0–0.2)
BASOPHILS NFR BLD: 0 % (ref 0–2)
BILIRUB SERPL-MCNC: 0.3 MG/DL (ref 0.2–1.1)
BILIRUB UR QL: NEGATIVE
BLOOD GROUP ANTIBODIES SERPL: NORMAL
BLOOD GROUP ANTIBODIES SERPL: NORMAL
BNP SERPL-MCNC: 1288 PG/ML (ref 5–125)
BUN SERPL-MCNC: 10 MG/DL (ref 6–23)
C TRACH RRNA SPEC QL NAA+PROBE: NEGATIVE
CALCIUM SERPL-MCNC: 8.5 MG/DL (ref 8.3–10.4)
CALCULATED P AXIS, ECG09: 73 DEGREES
CALCULATED P AXIS, ECG09: 73 DEGREES
CALCULATED R AXIS, ECG10: 52 DEGREES
CALCULATED R AXIS, ECG10: 60 DEGREES
CALCULATED T AXIS, ECG11: 31 DEGREES
CALCULATED T AXIS, ECG11: 33 DEGREES
CASTS URNS QL MICRO: ABNORMAL /LPF
CHLORIDE SERPL-SCNC: 110 MMOL/L (ref 98–107)
CO2 SERPL-SCNC: 23 MMOL/L (ref 21–32)
COLLECT DURATION TIME UR: 24 HR
COLOR UR: YELLOW
COVID-19 RAPID TEST, COVR: NOT DETECTED
CREAT SERPL-MCNC: 0.69 MG/DL (ref 0.6–1)
DIAGNOSIS, 93000: NORMAL
DIAGNOSIS, 93000: NORMAL
DIFFERENTIAL METHOD BLD: ABNORMAL
EOSINOPHIL # BLD: 0.1 K/UL (ref 0–0.8)
EOSINOPHIL NFR BLD: 0 % (ref 0.5–7.8)
EPI CELLS #/AREA URNS HPF: ABNORMAL /HPF
ERYTHROCYTE [DISTWIDTH] IN BLOOD BY AUTOMATED COUNT: 13.2 % (ref 11.9–14.6)
GLOBULIN SER CALC-MCNC: 4.2 G/DL (ref 2.3–3.5)
GLUCOSE SERPL-MCNC: 90 MG/DL (ref 65–100)
GLUCOSE UR STRIP.AUTO-MCNC: NEGATIVE MG/DL
HCT VFR BLD AUTO: 30.4 % (ref 35.8–46.3)
HGB BLD-MCNC: 9.7 G/DL (ref 11.7–15.4)
HGB UR QL STRIP: ABNORMAL
IMM GRANULOCYTES # BLD AUTO: 1 K/UL (ref 0–0.5)
IMM GRANULOCYTES NFR BLD AUTO: 5 % (ref 0–5)
KETONES UR QL STRIP.AUTO: NEGATIVE MG/DL
LACTATE SERPL-SCNC: 2.3 MMOL/L (ref 0.4–2)
LEUKOCYTE ESTERASE UR QL STRIP.AUTO: ABNORMAL
LYMPHOCYTES # BLD: 1.6 K/UL (ref 0.5–4.6)
LYMPHOCYTES NFR BLD: 7 % (ref 13–44)
MCH RBC QN AUTO: 26.6 PG (ref 26.1–32.9)
MCHC RBC AUTO-ENTMCNC: 31.9 G/DL (ref 31.4–35)
MCV RBC AUTO: 83.5 FL (ref 79.6–97.8)
MONOCYTES # BLD: 1.3 K/UL (ref 0.1–1.3)
MONOCYTES NFR BLD: 6 % (ref 4–12)
N GONORRHOEA RRNA SPEC QL NAA+PROBE: NEGATIVE
NEUTS SEG # BLD: 17.4 K/UL (ref 1.7–8.2)
NEUTS SEG NFR BLD: 81 % (ref 43–78)
NITRITE UR QL STRIP.AUTO: NEGATIVE
NRBC # BLD: 0.03 K/UL (ref 0–0.2)
P-R INTERVAL, ECG05: 160 MS
P-R INTERVAL, ECG05: 160 MS
PH UR STRIP: 6.5 [PH] (ref 5–9)
PLATELET # BLD AUTO: 231 K/UL (ref 150–450)
PMV BLD AUTO: 11.2 FL (ref 9.4–12.3)
POTASSIUM SERPL-SCNC: 3.3 MMOL/L (ref 3.5–5.1)
PROCALCITONIN SERPL-MCNC: 0.11 NG/ML (ref 0–0.49)
PROT 24H UR-MRATE: 312 MG/24HR
PROT SERPL-MCNC: 6.6 G/DL (ref 6.3–8.2)
PROT UR STRIP-MCNC: NEGATIVE MG/DL
PROT UR-MCNC: 11 MG/DL
Q-T INTERVAL, ECG07: 344 MS
Q-T INTERVAL, ECG07: 362 MS
QRS DURATION, ECG06: 78 MS
QRS DURATION, ECG06: 80 MS
QTC CALCULATION (BEZET), ECG08: 409 MS
QTC CALCULATION (BEZET), ECG08: 416 MS
RBC # BLD AUTO: 3.64 M/UL (ref 4.05–5.2)
RBC #/AREA URNS HPF: ABNORMAL /HPF
SERVICE CMNT-IMP: NORMAL
SERVICE CMNT-IMP: NORMAL
SODIUM SERPL-SCNC: 142 MMOL/L (ref 136–145)
SOURCE, COVRS: NORMAL
SP GR UR REFRACTOMETRY: 1.04 (ref 1–1.02)
SPECIMEN EXP DATE BLD: NORMAL
SPECIMEN EXP DATE BLD: NORMAL
SPECIMEN SOURCE: NORMAL
SPECIMEN VOL ?TM UR: 2840 ML
TROPONIN-HIGH SENSITIVITY: 12.1 PG/ML (ref 0–14)
UROBILINOGEN UR QL STRIP.AUTO: 0.2 EU/DL (ref 0.2–1)
VENTRICULAR RATE, ECG03: 77 BPM
VENTRICULAR RATE, ECG03: 88 BPM
WBC # BLD AUTO: 21.4 K/UL (ref 4.3–11.1)
WBC URNS QL MICRO: ABNORMAL /HPF

## 2022-05-02 PROCEDURE — G0378 HOSPITAL OBSERVATION PER HR: HCPCS

## 2022-05-02 PROCEDURE — 59025 FETAL NON-STRESS TEST: CPT | Performed by: OBSTETRICS & GYNECOLOGY

## 2022-05-02 PROCEDURE — 74011250637 HC RX REV CODE- 250/637: Performed by: OBSTETRICS & GYNECOLOGY

## 2022-05-02 PROCEDURE — 77030013140 HC MSK NEB VYRM -A

## 2022-05-02 PROCEDURE — 87635 SARS-COV-2 COVID-19 AMP PRB: CPT

## 2022-05-02 PROCEDURE — 81001 URINALYSIS AUTO W/SCOPE: CPT

## 2022-05-02 PROCEDURE — 99220 PR INITIAL OBSERVATION CARE/DAY 70 MINUTES: CPT | Performed by: OBSTETRICS & GYNECOLOGY

## 2022-05-02 PROCEDURE — 87040 BLOOD CULTURE FOR BACTERIA: CPT

## 2022-05-02 PROCEDURE — 84484 ASSAY OF TROPONIN QUANT: CPT

## 2022-05-02 PROCEDURE — 96376 TX/PRO/DX INJ SAME DRUG ADON: CPT

## 2022-05-02 PROCEDURE — 87086 URINE CULTURE/COLONY COUNT: CPT

## 2022-05-02 PROCEDURE — 74011250636 HC RX REV CODE- 250/636: Performed by: OBSTETRICS & GYNECOLOGY

## 2022-05-02 PROCEDURE — 83880 ASSAY OF NATRIURETIC PEPTIDE: CPT

## 2022-05-02 PROCEDURE — 74011000258 HC RX REV CODE- 258: Performed by: OBSTETRICS & GYNECOLOGY

## 2022-05-02 PROCEDURE — 96372 THER/PROPH/DIAG INJ SC/IM: CPT

## 2022-05-02 PROCEDURE — 77030021668 HC NEB PREFIL KT VYRM -A

## 2022-05-02 PROCEDURE — 80053 COMPREHEN METABOLIC PANEL: CPT

## 2022-05-02 PROCEDURE — 94760 N-INVAS EAR/PLS OXIMETRY 1: CPT

## 2022-05-02 PROCEDURE — 96365 THER/PROPH/DIAG IV INF INIT: CPT

## 2022-05-02 PROCEDURE — 65270000029 HC RM PRIVATE

## 2022-05-02 PROCEDURE — 86900 BLOOD TYPING SEROLOGIC ABO: CPT

## 2022-05-02 PROCEDURE — 36415 COLL VENOUS BLD VENIPUNCTURE: CPT

## 2022-05-02 PROCEDURE — 74011000250 HC RX REV CODE- 250: Performed by: OBSTETRICS & GYNECOLOGY

## 2022-05-02 PROCEDURE — 94640 AIRWAY INHALATION TREATMENT: CPT

## 2022-05-02 PROCEDURE — 99233 SBSQ HOSP IP/OBS HIGH 50: CPT | Performed by: OBSTETRICS & GYNECOLOGY

## 2022-05-02 PROCEDURE — 85025 COMPLETE CBC W/AUTO DIFF WBC: CPT

## 2022-05-02 PROCEDURE — 96375 TX/PRO/DX INJ NEW DRUG ADDON: CPT

## 2022-05-02 PROCEDURE — 77010033711 HC HIGH FLOW OXYGEN

## 2022-05-02 PROCEDURE — 74011000636 HC RX REV CODE- 636: Performed by: OBSTETRICS & GYNECOLOGY

## 2022-05-02 PROCEDURE — 83605 ASSAY OF LACTIC ACID: CPT

## 2022-05-02 PROCEDURE — 87070 CULTURE OTHR SPECIMN AEROBIC: CPT

## 2022-05-02 PROCEDURE — 99356 PR PROLONGED SVC I/P OR OBS SETTING 1ST HOUR: CPT | Performed by: OBSTETRICS & GYNECOLOGY

## 2022-05-02 PROCEDURE — 71260 CT THORAX DX C+: CPT

## 2022-05-02 PROCEDURE — 93005 ELECTROCARDIOGRAM TRACING: CPT | Performed by: INTERNAL MEDICINE

## 2022-05-02 PROCEDURE — 84145 PROCALCITONIN (PCT): CPT

## 2022-05-02 PROCEDURE — 77030012793 HC CIRC VNTLTR FISP -B

## 2022-05-02 RX ORDER — ADHESIVE BANDAGE
30 BANDAGE TOPICAL DAILY PRN
Status: DISCONTINUED | OUTPATIENT
Start: 2022-05-02 | End: 2022-05-03

## 2022-05-02 RX ORDER — ZOLPIDEM TARTRATE 5 MG/1
5 TABLET ORAL
Status: DISCONTINUED | OUTPATIENT
Start: 2022-05-02 | End: 2022-05-04 | Stop reason: HOSPADM

## 2022-05-02 RX ORDER — OXYCODONE HYDROCHLORIDE 5 MG/1
5 TABLET ORAL ONCE
Status: COMPLETED | OUTPATIENT
Start: 2022-05-02 | End: 2022-05-02

## 2022-05-02 RX ORDER — OXYCODONE HYDROCHLORIDE 5 MG/1
10 TABLET ORAL
Status: DISCONTINUED | OUTPATIENT
Start: 2022-05-02 | End: 2022-05-03

## 2022-05-02 RX ORDER — LEVALBUTEROL INHALATION SOLUTION 0.63 MG/3ML
0.63 SOLUTION RESPIRATORY (INHALATION)
Status: DISCONTINUED | OUTPATIENT
Start: 2022-05-02 | End: 2022-05-02

## 2022-05-02 RX ORDER — POLYETHYLENE GLYCOL 3350 17 G/17G
17 POWDER, FOR SOLUTION ORAL
Status: DISCONTINUED | OUTPATIENT
Start: 2022-05-02 | End: 2022-05-02

## 2022-05-02 RX ORDER — CYCLOBENZAPRINE HCL 10 MG
10 TABLET ORAL ONCE
Status: COMPLETED | OUTPATIENT
Start: 2022-05-03 | End: 2022-05-03

## 2022-05-02 RX ORDER — ACETAMINOPHEN 500 MG
1000 TABLET ORAL
Status: DISCONTINUED | OUTPATIENT
Start: 2022-05-02 | End: 2022-05-04 | Stop reason: HOSPADM

## 2022-05-02 RX ORDER — IPRATROPIUM BROMIDE 0.5 MG/2.5ML
0.5 SOLUTION RESPIRATORY (INHALATION)
Status: DISCONTINUED | OUTPATIENT
Start: 2022-05-02 | End: 2022-05-02

## 2022-05-02 RX ORDER — LEVOTHYROXINE SODIUM 75 UG/1
37.5 TABLET ORAL
Status: DISCONTINUED | OUTPATIENT
Start: 2022-05-03 | End: 2022-05-04 | Stop reason: HOSPADM

## 2022-05-02 RX ORDER — ACETAMINOPHEN 500 MG
1000 TABLET ORAL ONCE
Status: COMPLETED | OUTPATIENT
Start: 2022-05-02 | End: 2022-05-02

## 2022-05-02 RX ORDER — LEVALBUTEROL INHALATION SOLUTION 0.63 MG/3ML
0.63 SOLUTION RESPIRATORY (INHALATION)
Status: DISCONTINUED | OUTPATIENT
Start: 2022-05-03 | End: 2022-05-03

## 2022-05-02 RX ORDER — POLYETHYLENE GLYCOL 3350 17 G/17G
17 POWDER, FOR SOLUTION ORAL
Status: DISCONTINUED | OUTPATIENT
Start: 2022-05-02 | End: 2022-05-04 | Stop reason: HOSPADM

## 2022-05-02 RX ORDER — OXYCODONE HYDROCHLORIDE 5 MG/1
5 TABLET ORAL
Status: DISCONTINUED | OUTPATIENT
Start: 2022-05-02 | End: 2022-05-02

## 2022-05-02 RX ORDER — SODIUM CHLORIDE 0.9 % (FLUSH) 0.9 %
10 SYRINGE (ML) INJECTION
Status: COMPLETED | OUTPATIENT
Start: 2022-05-02 | End: 2022-05-02

## 2022-05-02 RX ORDER — ENOXAPARIN SODIUM 100 MG/ML
40 INJECTION SUBCUTANEOUS EVERY 24 HOURS
Status: DISCONTINUED | OUTPATIENT
Start: 2022-05-03 | End: 2022-05-03

## 2022-05-02 RX ORDER — SODIUM CHLORIDE, SODIUM LACTATE, POTASSIUM CHLORIDE, CALCIUM CHLORIDE 600; 310; 30; 20 MG/100ML; MG/100ML; MG/100ML; MG/100ML
25 INJECTION, SOLUTION INTRAVENOUS CONTINUOUS
Status: DISCONTINUED | OUTPATIENT
Start: 2022-05-02 | End: 2022-05-02

## 2022-05-02 RX ORDER — ENOXAPARIN SODIUM 100 MG/ML
40 INJECTION SUBCUTANEOUS EVERY 12 HOURS
Status: DISCONTINUED | OUTPATIENT
Start: 2022-05-02 | End: 2022-05-02

## 2022-05-02 RX ADMIN — PROMETHAZINE HYDROCHLORIDE 25 MG: 25 INJECTION INTRAMUSCULAR; INTRAVENOUS at 00:36

## 2022-05-02 RX ADMIN — LEVALBUTEROL HYDROCHLORIDE 0.63 MG: 0.63 SOLUTION RESPIRATORY (INHALATION) at 21:54

## 2022-05-02 RX ADMIN — SODIUM CHLORIDE, PRESERVATIVE FREE 5 ML: 5 INJECTION INTRAVENOUS at 14:00

## 2022-05-02 RX ADMIN — Medication 10 ML: at 10:45

## 2022-05-02 RX ADMIN — OXYCODONE 5 MG: 5 TABLET ORAL at 21:54

## 2022-05-02 RX ADMIN — IOPAMIDOL 100 ML: 755 INJECTION, SOLUTION INTRAVENOUS at 10:45

## 2022-05-02 RX ADMIN — OXYCODONE 5 MG: 5 TABLET ORAL at 16:15

## 2022-05-02 RX ADMIN — BUTORPHANOL TARTRATE 1 MG: 2 INJECTION, SOLUTION INTRAMUSCULAR; INTRAVENOUS at 07:41

## 2022-05-02 RX ADMIN — SODIUM CHLORIDE 3 G: 900 INJECTION INTRAVENOUS at 18:13

## 2022-05-02 RX ADMIN — SODIUM CHLORIDE 3 G: 900 INJECTION INTRAVENOUS at 12:31

## 2022-05-02 RX ADMIN — MAGNESIUM HYDROXIDE 30 ML: 2400 SUSPENSION ORAL at 17:04

## 2022-05-02 RX ADMIN — BUTORPHANOL TARTRATE 1 MG: 2 INJECTION, SOLUTION INTRAMUSCULAR; INTRAVENOUS at 00:37

## 2022-05-02 RX ADMIN — POLYETHYLENE GLYCOL 3350 17 G: 17 POWDER, FOR SOLUTION ORAL at 16:15

## 2022-05-02 RX ADMIN — ENOXAPARIN SODIUM 40 MG: 40 INJECTION SUBCUTANEOUS at 10:09

## 2022-05-02 RX ADMIN — LEVALBUTEROL HYDROCHLORIDE 0.63 MG: 0.63 SOLUTION RESPIRATORY (INHALATION) at 15:30

## 2022-05-02 RX ADMIN — SODIUM CHLORIDE, SODIUM LACTATE, POTASSIUM CHLORIDE, AND CALCIUM CHLORIDE 25 ML/HR: 600; 310; 30; 20 INJECTION, SOLUTION INTRAVENOUS at 10:20

## 2022-05-02 RX ADMIN — ACETAMINOPHEN 1000 MG: 500 TABLET, FILM COATED ORAL at 10:12

## 2022-05-02 RX ADMIN — ACETAMINOPHEN 1000 MG: 500 TABLET, FILM COATED ORAL at 21:54

## 2022-05-02 RX ADMIN — OXYCODONE 5 MG: 5 TABLET ORAL at 20:33

## 2022-05-02 RX ADMIN — SODIUM CHLORIDE 100 ML: 9 INJECTION, SOLUTION INTRAVENOUS at 10:46

## 2022-05-02 RX ADMIN — DOCUSATE SODIUM 100 MG: 100 CAPSULE ORAL at 09:27

## 2022-05-02 NOTE — CONSULTS
Maternal Fetal Medicine Inpatient Consult Note      Chief Complaint:  Pregnancy and shortness of breath and contractions. History of Present Illness: 23 y.o.  at 33w0d gestation who presents with shortness of breath following dc from hospital.  Pt previously admitted for PTL without cervical change. Went home on 30mg po procardia q6h. Developed shortness of breath at rest once at home. Originally dyspnea was felt to be secondary to nifedipine. She complains of shortness of breath at this time. Developed hypoxia over course of night. CT PE performed to r/o PE. (Lovenox started prior to scan out of concern) CT with concern for bilateral aspiration. No PE. No significant pleural effusions or evidence of fluid overload. However BNP elevated. New onset HTN. No significant cough. No fevers (tylenol given for temp 99 at 1000)  Fetal tachycardia, but good variability throughout. No recent flu/covid exposures. Rapid covid negative     Ctx have continued, mild in pain. No cervical change. Last BM Thursday, pt feels this may be a component of ctx. Patient denies HA, abdominal pain, vision changes. No regular contractions, LOF, VB. Good FM. Moderate edema which is new. No significant reflux, nausea, constipation, or other GI complaints. No recent fetal evaluation. Review of Systems:  A comprehensive review of systems was negative except for that written in the HPI.      History:  OB History    Para Term  AB Living   2 0 0 0 1 0   SAB IAB Ectopic Molar Multiple Live Births   1 0 0 0 0 0      # Outcome Date GA Lbr Henry/2nd Weight Sex Delivery Anes PTL Lv   2 Current            1 SAB                Past Surgical History:   Procedure Laterality Date    HX COLONOSCOPY      HX GYN  2020    IUD placement    HX ORTHOPAEDIC  age 1    Achilles tendon lengthening    HX TONSILLECTOMY         Past Medical History:   Diagnosis Date    Goiter     Hashimoto's thyroiditis     Irritable bowel syndrome with constipation     Major depressive disorder, single episode with anxious distress 10/11/2018    Vitamin D deficiency      FHx noncontributory.      No Known Allergies      Current Facility-Administered Medications:     ampicillin-sulbactam (UNASYN) 3 g in 0.9% sodium chloride (MBP/ADV) 100 mL MBP, 3 g, IntraVENous, Q6H, Letha Marquez DO, Stopped at 05/02/22 1301    [START ON 5/3/2022] levothyroxine (SYNTHROID) tablet 37.5 mcg, 37.5 mcg, Oral, ACB, Tosin Dominguez MD    [START ON 5/3/2022] enoxaparin (LOVENOX) injection 40 mg, 40 mg, SubCUTAneous, Q24H, Tosin Dominguez MD    levalbuterol (XOPENEX) nebulizer soln 0.63 mg/3 mL, 0.63 mg, Nebulization, Q4H RT, Tosin Dominguez MD, 0.63 mg at 05/02/22 1530    polyethylene glycol (MIRALAX) packet 17 g, 17 g, Oral, Q6H PRN, Tosin Dominguez MD    magnesium hydroxide (MILK OF MAGNESIA) 400 mg/5 mL oral suspension 30 mL, 30 mL, Oral, DAILY PRN, Tosin Dominguez MD    sodium chloride (NS) flush 5-40 mL, 5-40 mL, IntraVENous, Q8H, Janeen Fleming MD    sodium chloride (NS) flush 5-40 mL, 5-40 mL, IntraVENous, PRN, Janeen Fleming MD    acetaminophen (TYLENOL) tablet 650 mg, 650 mg, Oral, Q4H PRN, Janeen Fleming MD, 650 mg at 05/01/22 2123    Objective:     Vitals:     Patient Vitals for the past 24 hrs:   Temp Pulse Resp BP SpO2   05/02/22 1530 -- -- -- -- 95 %   05/02/22 1515 -- -- -- -- 94 %   05/02/22 1507 -- -- -- -- 94 %   05/02/22 1501 -- -- -- -- 94 %   05/02/22 1451 -- -- -- -- 92 %   05/02/22 1448 -- -- -- -- 92 %   05/02/22 1442 -- -- -- -- (!) 89 %   05/02/22 1435 -- -- -- -- (!) 89 %   05/02/22 1430 -- -- -- -- (!) 89 %   05/02/22 1421 -- -- -- -- (!) 89 %   05/02/22 1417 -- 81 -- 139/78 --   05/02/22 1402 -- 83 -- 127/75 --   05/02/22 1348 -- -- -- -- 91 %   05/02/22 1337 -- -- -- -- (!) 89 %   05/02/22 1332 -- 88 -- 132/69 --   05/02/22 1330 -- -- -- -- (!) 89 %   05/02/22 1325 -- -- -- -- (!) 89 %   05/02/22 1318 -- -- -- -- (!) 89 %   05/02/22 1317 -- 87 -- 129/65 --   05/02/22 1309 -- -- -- -- (!) 89 %   05/02/22 1302 -- 91 -- 132/62 --   05/02/22 1259 -- -- -- -- (!) 89 %   05/02/22 1233 99.2 °F (37.3 °C) 88 18 136/71 90 %   05/02/22 1217 -- 91 -- (!) 147/76 --   05/02/22 1204 -- 87 18 (!) 167/78 93 %   05/02/22 1159 -- -- -- -- 92 %   05/02/22 1154 -- -- -- -- 92 %   05/02/22 1149 -- -- -- -- 91 %   05/02/22 1148 -- 91 -- 134/77 --   05/02/22 1139 -- -- -- -- 94 %   05/02/22 1134 -- -- -- -- 94 %   05/02/22 1132 -- 88 -- (!) 141/75 --   05/02/22 1131 -- -- -- -- 94 %   05/02/22 1129 -- -- -- -- 95 %   05/02/22 1119 99.4 °F (37.4 °C) 90 -- (!) 150/78 92 %   05/02/22 1031 -- 85 -- (!) 143/74 94 %   05/02/22 1029 -- -- -- -- 93 %   05/02/22 1027 -- -- -- -- 92 %   05/02/22 1025 -- -- -- -- 94 %   05/02/22 1017 -- (!) 121 -- (!) 179/98 --   05/02/22 1010 -- -- -- -- 95 %   05/02/22 1008 -- -- -- -- 95 %   05/02/22 1006 -- -- -- -- 94 %   05/02/22 1004 -- -- -- -- 93 %   05/02/22 1002 -- -- -- -- 93 %   05/02/22 1000 -- 91 -- (!) 147/74 91 %   05/02/22 0958 -- -- -- -- 91 %   05/02/22 0956 -- -- -- -- 90 %   05/02/22 0954 -- -- -- -- 93 %   05/02/22 0952 -- -- -- -- 93 %   05/02/22 0950 -- -- -- -- 93 %   05/02/22 0948 -- -- -- -- 93 %   05/02/22 0946 -- 93 -- -- 93 %   05/02/22 0945 -- -- -- (!) 162/85 --   05/02/22 0944 -- -- -- -- 93 %   05/02/22 0942 -- -- -- -- 93 %   05/02/22 0940 -- -- -- -- 92 %   05/02/22 0938 -- -- -- -- 93 %   05/02/22 0936 -- -- -- -- 92 %   05/02/22 0931 99.9 °F (37.7 °C) (!) 109 22 131/73 --   05/02/22 0924 -- -- -- -- 91 %   05/02/22 0922 -- -- -- -- 91 %   05/02/22 0920 -- -- -- -- 90 %   05/02/22 0918 -- -- -- -- 91 %   05/02/22 0916 -- -- -- -- 91 %   05/02/22 0915 -- 93 -- (!) 160/88 --   05/02/22 0914 -- -- -- -- 91 %   05/02/22 0912 -- -- -- -- 91 %   05/02/22 0910 -- -- -- -- 91 %   05/02/22 0908 -- -- -- -- 91 %   05/02/22 0906 -- -- -- -- 92 %   05/02/22 0904 -- -- -- -- 92 %   05/02/22 9949 -- -- -- -- 93 %   05/02/22 0900 -- 93 -- (!) 158/88 93 %   05/02/22 0858 -- -- -- -- 92 %   05/02/22 0856 -- -- -- -- 94 %   05/02/22 0855 -- 88 -- (!) 156/85 --   05/02/22 0854 -- -- -- -- 94 %   05/02/22 0852 -- -- -- -- 90 %   05/02/22 0850 -- -- -- -- 91 %   05/02/22 0848 -- -- -- -- 91 %   05/02/22 0846 -- -- -- -- (!) 89 %   05/02/22 0845 -- -- -- -- (!) 89 %   05/02/22 0842 -- -- -- -- 90 %   05/02/22 0840 -- -- -- -- 93 %   05/02/22 0839 -- -- -- -- (!) 89 %   05/02/22 0833 -- -- -- -- (!) 89 %   05/02/22 0812 -- 88 -- (!) 146/79 --   05/02/22 0809 -- -- -- -- 90 %   05/02/22 0805 -- -- -- -- 93 %   05/02/22 0804 -- -- -- -- (!) 86 %   05/02/22 0802 -- 93 -- (!) 146/82 (!) 87 %   05/02/22 0759 -- -- -- -- 90 %   05/02/22 0754 -- -- -- -- 94 %   05/02/22 0749 -- -- -- -- 93 %   05/02/22 0746 -- 94 -- (!) 142/81 94 %   05/02/22 0744 -- -- -- -- 97 %   05/02/22 0739 -- -- -- -- 97 %   05/02/22 0734 -- -- -- -- 98 %   05/02/22 0729 99.2 °F (37.3 °C) 86 -- (!) 160/86 98 %   05/02/22 0553 -- 83 17 127/73 --   05/02/22 0309 98.2 °F (36.8 °C) 94 17 133/72 --   05/01/22 1924 -- 88 -- (!) 118/59 --   05/01/22 1852 -- 73 -- 127/73 --   05/01/22 1842 -- 80 -- 119/65 --   05/01/22 1832 -- 81 -- 116/61 --   05/01/22 1827 -- 81 -- (!) 107/59 --   05/01/22 1812 98.4 °F (36.9 °C) (!) 107 -- (!) 148/78 99 %   05/01/22 1807 -- -- -- -- 98 %   05/01/22 1802 -- -- -- -- 99 %        I&O:   05/02 0701 - 05/02 1900  In: -   Out: 925 [Urine:925]             04/30 1901 - 05/02 0700  In: 700 [P.O.:700]  Out: 850 [Urine:850]  Output by Drain (mL) 04/30/22 0701 - 04/30/22 1900 04/30/22 1901 - 05/01/22 0700 05/01/22 0701 - 05/01/22 1900 05/01/22 1901 - 05/02/22 0700 05/02/22 0701 - 05/02/22 1546   Patient has no LDAs of requested type attached. Exam:   Constitutional: Patient without distress. HEENT: Symmetric without facial palsy, uncorrected poor hearing or uncorrected poor vision.  No thyroid enlargement or goiter  Chest: No use of accessory muscles or excessive work of breathing  Lungs: CTAB- no crackles noted. Heart:  regular rate and rhythm  Abdomen: gravid, soft, non-tender; Fundus: soft and non tender  Genitourinary: deferred as without complaints of labor or ROM  Cervical Exam:  Dilation (cm):  (FT) Eff: 60 %  Membranes: Membrane Status: Intact  Lower Extremities: Edema: 1+ bilaterally  Skin: normal coloration and turgor, no rashes, no suspicious skin lesions noted. Neurologic: AOx3. Gait normal. Reflexes and motor strength normal and symmetric. Cranial nerves 2-12 and sensation grossly intact. Psychiatric: Mood appropriate, non focal    Maternal and Fetal Monitoring:                              Uterine Activity: Frequency (min): irritability Intensity: [place] Adjusted  Fetal Heart Rate: Mode: ExternalFetal Heart Rate: 160      NST:  · Indications:  Maternal hypoxia  · Extended monitoring   · Results:  Reactive   · FHR Baseline Rate:  170s initially, with time improved to 155 with mod linda. · Periodic Changes:  Good accelerations. · Comments:  No decels.      Labs:   CBC:    Recent Labs     05/02/22  1026 05/01/22 1816 04/29/22 1909 04/01/22  0947 02/17/22  0929 11/03/21  1129 11/03/21  0000 08/28/21  0916 08/07/21 1937   WBC 21.4* 16.2* 12.6*  --  10.2 7.1  --  4.9 6.8   HGB 9.7* 9.4* 10.5* 10.6* 11.0* 11.8  --  13.9 11.8   HCT 30.4* 29.5* 32.3*  --  33.4* 36.1  --  42.6 35.3*    256 242  --  239 260  --  268 279   HGBEXT  --   --   --   --   --   --  11.8  --   --    HCTEXT  --   --   --   --   --   --  36.1  --   --    PLTEXT  --   --   --   --   --   --  260  --   --      CMP:   Recent Labs     05/02/22  1026 05/01/22 1816 04/29/22  1909 02/17/22  0929 08/28/21  0916 08/07/21 1937     --  137 134 138 142   K 3.3*  --  3.7 3.8 3.8 3.8   *  --  105 103 104 108*   CO2 23  --  23 21 30 29   AGAP 9  --  9  --  4* 5*   GLU 90  --  84 110* 104* 75   BUN 10 8 7 5* 12 8   CREA 0.69 0.69 0.44* 0.32* 0.53* 0.46*   GFRAA >60  --  >60 183 >60 >60   GFRNA >60  --  >60 159 >60 >60   CA 8.5  --  9.3 8.5* 9.1 8.7   ALB 2.4* 2.6* 2.4* 3.5* 4.4  --    TP 6.6 6.8 6.9 6.3 8.2  --    GLOB 4.2* 4.2* 4.5*  --  3.8*  --    AGRAT 0.6* 0.6* 0.5* 1.3 1.2  --    ALT 16 15 12 20 20  --        Recent Labs     22  09   URICA 3.4          Recent Glucose Results: Recent Glucose Results:   Recent Labs     22  1026 22  1909 22  0929 21  0916 21  1937   GLU 90 84 110* 104* 75       Prenatal Labs:    Lab Results   Component Value Date/Time    Rubella, External immune 2021 12:00 AM    HBsAg, External negative 2021 12:00 AM    HIV, External NR 2021 12:00 AM    RPR, External NR 2021 12:00 AM    Gonorrhea, External negative 2021 12:00 AM    Chlamydia, External negative 2021 12:00 AM       Assessment and Plan:  23 y.o.  at 33w0d with     Principal Problem:    Threatened  labor, third trimester (2022)    Active Problems:    Abdominal pain during pregnancy in third trimester (2022)      Shortness of breath during pregnancy (2022)      Aspiration pneumonia ([facility] [address] (2022)      Sepsis due to anaerobic streptococci ([facility] [address] (2022)      Acute respiratory failure with hypoxia ([facility]) (2022)      1) Hypoxia with likely Aspiration Pneumonia and concern for heart strain. Unasyn. Neb tx. Maintain O2 >95% for fetal oxygenation. Incentive Spirometry. Echocardiogram pending. Lactate elevated, troponin and procalcitonin reassuring   Blood and Urine Cx pending. EKG reassuring. CT PE without PE. PPx lovenox q24h. Roxicodone 5mg q4hr prn mod back/subscapular pain. Heat therapy    2)  Contractions. S/P bmz. No procardia [place]. Past gestational age for magnesium neuroprotection. Past gestational age for indomethacin [place]. If begins to labor, hold lovenox. SCDs in place. [place] prn complaints. 3) Gestational HTN. No current evidence of preeclampsia. 24hr urine in progress. HELLP labs reassuring    If concern for  delivery, will have NICU consult. They are aware of patient's presence and will see when needed. Did not require NICU consult last admission. Fetal growth on 5/3/22 by MFM. Time:120   Minutes spent on floor,with greater than 50% of the time examining patient, explaining plan and coordinating care with nurse and requesting primary physician.

## 2022-05-02 NOTE — PROGRESS NOTES
CT scan ordered stat. msg left on CT scan phone. Anesthesia called to start another line and draw blood work. Orders received for lovenox  scds remain in place.

## 2022-05-02 NOTE — CONSULTS
[facility] Hospitalist Consult   Admit Date:  2022  5:51 PM   Name:  Dixon Trimble   Age:  23 y.o. Sex:  female  :  2000   MRN:  [id number]   Room:  [facility]    Presenting Complaint: Swelling, Headache, and Contractions    Reason(s) for Admission: Threatened  labor, third trimester [O47.03]     Hospitalists consulted by Radha David MD for: aspiration pneumonia and shortness of brath    History of Presenting Illness:   Dixon Trimble is a 23 y.o. female with history of hypothyroidism admitted to the Ob/Gyn service for shortness of breath and back pain. She is currently 33 weeks pregnant. Hospitalist service consulted for shortness of breath and for aspiration pneumonia. Patient says \"I was really hungry yesterday and I think I just ate some chicken too fast.\" She did have a coughing spell. She says she has also been having much more swelling in her lower extremities the past day, she says \"I had trouble walking up stairs because my toes were so fat. \" She does have some chest discomfort along her sternum (patient pointed) that radiates to her back and her left arm. She has never had this before. Her pain is exacerbated by \"breathing in and out\" and is relieved \"when I had some cold water. \" No fevers but was given APAP for temperature of 99.9F. No history of childhood asthma No history of heart problems but says \"heart disease does run in my family. \"     Patient with unrevealing CXR and CT chest that is negative for PE but does show small bilateral consolidations/effusions. Started on Unasyn and placed on 3L NC. Review of Systems:  10 systems reviewed and negative except as noted in HPI.   Assessment & Plan:     Principal Problem:    Threatened  labor, third trimester (2022)  - per primary    Active Problems:    Abdominal pain during pregnancy in third trimester (2022)   - per primary       Shortness of breath during pregnancy (2022)  - most likely infectious but increased lower extremity swelling and elevated BNP with chest pain raises concern for cardiac involvement  - check procalcitonin and troponin  - check EKG  - CT chest negative for PE  - antibiotics as below  - check TTE      Aspiration pneumonia ([facility] (2022)  - agree with blood cultures x2  - agree with Unasyn empirically   - agree with COVID and viral panel with respiratory culture      Sepsis due to anaerobic streptococci ([facility] (2022)   - as above      Acute respiratory failure with hypoxia ([facility] (2022)  - likely due to above  - management as above  - wean supplemental oxygen as above  - goal SpO2>90%  - jet nebs as needed    Ppx: Lovenox for VTE    Code Status: FULL CODE    Thank you for this consult. IM Hospitalist will continue to follow along. Please page/call with questions or concerns. Discussed above with patient at bedside. All questions answered.      Hospital Problems as of 2022 Date Reviewed: 2022          Codes Class Noted - Resolved POA    Aspiration pneumonia ([facility] ICD-10-CM: J69.0  ICD-9-CM: 507.0  2022 - Present Unknown        Sepsis due to anaerobic streptococci ([facility] ICD-10-CM: A40.8  ICD-9-CM: 038.0  2022 - Present Unknown        Acute respiratory failure with hypoxia ([facility] ICD-10-CM: J96.01  ICD-9-CM: 518.81  2022 - Present Unknown        Abdominal pain during pregnancy in third trimester ICD-10-CM: O26.893, R10.9  ICD-9-CM: 646.83, 789.00  2022 - Present Yes        Shortness of breath during pregnancy ICD-10-CM: O99.891, R06.02  ICD-9-CM: 646.83, 786.05  2022 - Present Unknown        * (Principal) Threatened  labor, third trimester ICD-10-CM: O47.03  ICD-9-CM: 644.03  2022 - Present Yes              Past History:  Past Medical History:   Diagnosis Date    Goiter     Hashimoto's thyroiditis     Irritable bowel syndrome with constipation     Major depressive disorder, single episode with anxious distress 10/11/2018    Vitamin D deficiency       Past Surgical History:   Procedure Laterality Date    HX COLONOSCOPY  2020    HX GYN  03/17/2020    IUD placement    HX ORTHOPAEDIC  age 1    Achilles tendon lengthening    HX TONSILLECTOMY        No Known Allergies   Social History     Tobacco Use    Smoking status: Never Smoker    Smokeless tobacco: Never Used   Substance Use Topics    Alcohol use: Never      Family History   Problem Relation Age of Onset    COPD Mother     Thyroid Disease Mother         hypothyroidism    Asthma Mother     Thyroid Disease Father         hyperthyroidism    Asthma Brother     Ovarian Cancer Maternal Grandmother     Heart Disease Maternal Grandmother     Dementia Paternal Grandmother     Thyroid Cancer Maternal Cousin       Family history reviewed and negative except as otherwise noted.     Immunization History   Administered Date(s) Administered    Influenza Vaccine 09/07/2018, 10/20/2020    Influenza Vaccine (Quad) PF (>6 Mo Flulaval, Fluarix, and >3 Yrs Afluria, Fluzone 89954) 12/07/2017, 09/07/2018    TB Skin Test (PPD) Intradermal 03/24/2017    Tdap 08/16/2018, 04/22/2022     Current Facility-Administered Medications   Medication Dose Route Frequency    enoxaparin (LOVENOX) injection 40 mg  40 mg SubCUTAneous Q12H    ampicillin-sulbactam (UNASYN) 3 g in 0.9% sodium chloride (MBP/ADV) 100 mL MBP  3 g IntraVENous Q6H    sodium chloride (NS) flush 5-40 mL  5-40 mL IntraVENous Q8H    sodium chloride (NS) flush 5-40 mL  5-40 mL IntraVENous PRN    acetaminophen (TYLENOL) tablet 650 mg  650 mg Oral Q4H PRN    docusate sodium (COLACE) capsule 100 mg  100 mg Oral BID    polyethylene glycol (MIRALAX) packet 17 g  17 g Oral DAILY    zolpidem (AMBIEN) tablet 5 mg  5 mg Oral QHS PRN       Objective:     Patient Vitals for the past 24 hrs:   Temp Pulse Resp BP SpO2   05/02/22 1233 99.2 °F (37.3 °C) 88 18 136/71 90 %   05/02/22 1217 -- 91 -- (!) 147/76 --   05/02/22 1204 -- 87 18 (!) 167/78 93 % 05/02/22 1159 -- -- -- -- 92 %   05/02/22 1154 -- -- -- -- 92 %   05/02/22 1149 -- -- -- -- 91 %   05/02/22 1148 -- 91 -- 134/77 --   05/02/22 1139 -- -- -- -- 94 %   05/02/22 1134 -- -- -- -- 94 %   05/02/22 1132 -- 88 -- (!) 141/75 --   05/02/22 1131 -- -- -- -- 94 %   05/02/22 1129 -- -- -- -- 95 %   05/02/22 1119 99.4 °F (37.4 °C) 90 -- (!) 150/78 92 %   05/02/22 1031 -- 85 -- (!) 143/74 94 %   05/02/22 1029 -- -- -- -- 93 %   05/02/22 1027 -- -- -- -- 92 %   05/02/22 1025 -- -- -- -- 94 %   05/02/22 1017 -- (!) 121 -- (!) 179/98 --   05/02/22 1010 -- -- -- -- 95 %   05/02/22 1008 -- -- -- -- 95 %   05/02/22 1006 -- -- -- -- 94 %   05/02/22 1004 -- -- -- -- 93 %   05/02/22 1002 -- -- -- -- 93 %   05/02/22 1000 -- 91 -- (!) 147/74 91 %   05/02/22 0958 -- -- -- -- 91 %   05/02/22 0956 -- -- -- -- 90 %   05/02/22 0954 -- -- -- -- 93 %   05/02/22 0952 -- -- -- -- 93 %   05/02/22 0950 -- -- -- -- 93 %   05/02/22 0948 -- -- -- -- 93 %   05/02/22 0946 -- 93 -- -- 93 %   05/02/22 0945 -- -- -- (!) 162/85 --   05/02/22 0944 -- -- -- -- 93 %   05/02/22 0942 -- -- -- -- 93 %   05/02/22 0940 -- -- -- -- 92 %   05/02/22 0938 -- -- -- -- 93 %   05/02/22 0936 -- -- -- -- 92 %   05/02/22 0931 99.9 °F (37.7 °C) (!) 109 22 131/73 --   05/02/22 0924 -- -- -- -- 91 %   05/02/22 0922 -- -- -- -- 91 %   05/02/22 0920 -- -- -- -- 90 %   05/02/22 0918 -- -- -- -- 91 %   05/02/22 0916 -- -- -- -- 91 %   05/02/22 0915 -- 93 -- (!) 160/88 --   05/02/22 0914 -- -- -- -- 91 %   05/02/22 0912 -- -- -- -- 91 %   05/02/22 0910 -- -- -- -- 91 %   05/02/22 0908 -- -- -- -- 91 %   05/02/22 0906 -- -- -- -- 92 %   05/02/22 0904 -- -- -- -- 92 %   05/02/22 0902 -- -- -- -- 93 %   05/02/22 0900 -- 93 -- (!) 158/88 93 %   05/02/22 0858 -- -- -- -- 92 %   05/02/22 0856 -- -- -- -- 94 %   05/02/22 0855 -- 88 -- (!) 156/85 --   05/02/22 0854 -- -- -- -- 94 %   05/02/22 0852 -- -- -- -- 90 %   05/02/22 0850 -- -- -- -- 91 %   05/02/22 0848 -- -- -- -- 91 %   05/02/22 0846 -- -- -- -- (!) 89 %   05/02/22 0845 -- -- -- -- (!) 89 %   05/02/22 0842 -- -- -- -- 90 %   05/02/22 0840 -- -- -- -- 93 %   05/02/22 0839 -- -- -- -- (!) 89 %   05/02/22 0833 -- -- -- -- (!) 89 %   05/02/22 0812 -- 88 -- (!) 146/79 --   05/02/22 0809 -- -- -- -- 90 %   05/02/22 0805 -- -- -- -- 93 %   05/02/22 0804 -- -- -- -- (!) 86 %   05/02/22 0802 -- 93 -- (!) 146/82 (!) 87 %   05/02/22 0759 -- -- -- -- 90 %   05/02/22 0754 -- -- -- -- 94 %   05/02/22 0749 -- -- -- -- 93 %   05/02/22 0746 -- 94 -- (!) 142/81 94 %   05/02/22 0744 -- -- -- -- 97 %   05/02/22 0739 -- -- -- -- 97 %   05/02/22 0734 -- -- -- -- 98 %   05/02/22 0729 99.2 °F (37.3 °C) 86 -- (!) 160/86 98 %   05/02/22 0553 -- 83 17 127/73 --   05/02/22 0309 98.2 °F (36.8 °C) 94 17 133/72 --   05/01/22 1924 -- 88 -- (!) 118/59 --   05/01/22 1852 -- 73 -- 127/73 --   05/01/22 1842 -- 80 -- 119/65 --   05/01/22 1832 -- 81 -- 116/61 --   05/01/22 1827 -- 81 -- (!) 107/59 --   05/01/22 1812 98.4 °F (36.9 °C) (!) 107 -- (!) 148/78 99 %   05/01/22 1807 -- -- -- -- 98 %   05/01/22 1802 -- -- -- -- 99 %     Oxygen Therapy  O2 Sat (%): 90 % (05/02/22 1233)  O2 Device: Nasal cannula (05/02/22 1233)  O2 Flow Rate (L/min): 3 l/min (05/02/22 1233)    Estimated body mass index is 34.37 kg/m² as calculated from the following:    Height as of 4/29/22: 5' (1.524 m). Weight as of 4/29/22: 79.8 kg (176 lb). Intake/Output Summary (Last 24 hours) at 5/2/2022 1306  Last data filed at 5/2/2022 1152  Gross per 24 hour   Intake 700 ml   Output 1775 ml   Net -1075 ml         Physical Exam:    Blood pressure 136/71, pulse 88, temperature 99.2 °F (37.3 °C), resp. rate 18, last menstrual period 09/13/2021, SpO2 90 %, currently breastfeeding. General:    Well nourished. No overt distress  Head:  Normocephalic, atraumatic  Eyes:  Sclerae appear normal.  Pupils equally round. ENT:  Nares appear normal, no drainage.   Moist oral mucosa  Neck:  No restricted ROM.  Trachea midline   CV:   RRR. No jugular venous distension. Lungs:   CTAB. No wheezing, rhonchi, or rales. Respirations even, unlabored. On 3L NC  Abdomen: Bowel sounds present. Soft, nontender, nondistended. Extremities: No cyanosis or clubbing.  +2 peripheral edema b/l. Skin:     No rashes and normal coloration. Warm and dry. Neuro:  CN II-XII grossly intact. Sensation intact. A&Ox3  Psych:  Normal mood and affect. I have reviewed ordered lab tests and independently visualized imaging below:    Recent Labs:  Recent Results (from the past 48 hour(s))   CBC W/O DIFF    Collection Time: 05/01/22  6:16 PM   Result Value Ref Range    WBC 16.2 (H) 4.3 - 11.1 K/uL    RBC 3.51 (L) 4.05 - 5.2 M/uL    HGB 9.4 (L) 11.7 - 15.4 g/dL    HCT 29.5 (L) 35.8 - 46.3 %    MCV 84.0 79.6 - 97.8 FL    MCH 26.8 26.1 - 32.9 PG    MCHC 31.9 31.4 - 35.0 g/dL    RDW 13.1 11.9 - 14.6 %    PLATELET 433 038 - 660 K/uL    MPV 11.5 9.4 - 12.3 FL    ABSOLUTE NRBC 0.04 0.0 - 0.2 K/uL   BUN    Collection Time: 05/01/22  6:16 PM   Result Value Ref Range    BUN 8 6 - 23 MG/DL   CREATININE    Collection Time: 05/01/22  6:16 PM   Result Value Ref Range    Creatinine 0.69 0.6 - 1.0 MG/DL   HEPATIC FUNCTION PANEL    Collection Time: 05/01/22  6:16 PM   Result Value Ref Range    Protein, total 6.8 6.3 - 8.2 g/dL    Albumin 2.6 (L) 3.5 - 5.0 g/dL    Globulin 4.2 (H) 2.3 - 3.5 g/dL    A-G Ratio 0.6 (L) 1.2 - 3.5      Bilirubin, total 0.2 0.2 - 1.1 MG/DL    Bilirubin, direct 0.1 <0.4 MG/DL    Alk. phosphatase 81 50 - 130 U/L    AST (SGOT) 33 15 - 37 U/L    ALT (SGPT) 15 12 - 65 U/L   PROTEIN/CREATININE RATIO, URINE    Collection Time: 05/01/22  6:16 PM   Result Value Ref Range    Protein, urine random 12 mg/dL    Creatinine, urine 55.00 mg/dL    Protein/Creat.  urine Ratio 0.2     TSH 3RD GENERATION    Collection Time: 05/01/22  6:16 PM   Result Value Ref Range    TSH 3.710 uIU/mL   T4, FREE    Collection Time: 05/01/22  6:16 PM Result Value Ref Range    T4, Free 0.8 0.78 - 1.46 NG/DL   POC URINE DIPSTICK MANUAL    Collection Time: 05/01/22  6:30 PM   Result Value Ref Range    Protein (POC) Negative Negative    Glucose, urine (POC) Negative Negative    Ketones (POC) Negative Negative   EKG, 12 LEAD, INITIAL    Collection Time: 05/01/22  6:44 PM   Result Value Ref Range    Ventricular Rate 77 BPM    Atrial Rate 77 BPM    P-R Interval 160 ms    QRS Duration 80 ms    Q-T Interval 362 ms    QTC Calculation (Bezet) 409 ms    Calculated P Axis 73 degrees    Calculated R Axis 60 degrees    Calculated T Axis 33 degrees    Diagnosis       Normal sinus rhythm with sinus arrhythmia  Normal ECG  When compared with ECG of 30-APR-2020 14:05,  Criteria for Septal infarct are no longer Present  Nonspecific T wave abnormality now evident in Inferior leads     NT-PRO BNP    Collection Time: 05/02/22 10:26 AM   Result Value Ref Range    NT pro-BNP 1,288 (H) 5 - 125 PG/ML   CBC WITH AUTOMATED DIFF    Collection Time: 05/02/22 10:26 AM   Result Value Ref Range    WBC 21.4 (H) 4.3 - 11.1 K/uL    RBC 3.64 (L) 4.05 - 5.2 M/uL    HGB 9.7 (L) 11.7 - 15.4 g/dL    HCT 30.4 (L) 35.8 - 46.3 %    MCV 83.5 79.6 - 97.8 FL    MCH 26.6 26.1 - 32.9 PG    MCHC 31.9 31.4 - 35.0 g/dL    RDW 13.2 11.9 - 14.6 %    PLATELET 432 083 - 238 K/uL    MPV 11.2 9.4 - 12.3 FL    ABSOLUTE NRBC 0.03 0.0 - 0.2 K/uL    DF AUTOMATED      NEUTROPHILS 81 (H) 43 - 78 %    LYMPHOCYTES 7 (L) 13 - 44 %    MONOCYTES 6 4.0 - 12.0 %    EOSINOPHILS 0 (L) 0.5 - 7.8 %    BASOPHILS 0 0.0 - 2.0 %    IMMATURE GRANULOCYTES 5 0.0 - 5.0 %    ABS. NEUTROPHILS 17.4 (H) 1.7 - 8.2 K/UL    ABS. LYMPHOCYTES 1.6 0.5 - 4.6 K/UL    ABS. MONOCYTES 1.3 0.1 - 1.3 K/UL    ABS. EOSINOPHILS 0.1 0.0 - 0.8 K/UL    ABS. BASOPHILS 0.1 0.0 - 0.2 K/UL    ABS. IMM. GRANS.  1.0 (H) 0.0 - 0.5 K/UL   METABOLIC PANEL, COMPREHENSIVE    Collection Time: 05/02/22 10:26 AM   Result Value Ref Range    Sodium 142 136 - 145 mmol/L    Potassium 3.3 (L) 3.5 - 5.1 mmol/L    Chloride 110 (H) 98 - 107 mmol/L    CO2 23 21 - 32 mmol/L    Anion gap 9 7 - 16 mmol/L    Glucose 90 65 - 100 mg/dL    BUN 10 6 - 23 MG/DL    Creatinine 0.69 0.6 - 1.0 MG/DL    GFR est AA >60 >60 ml/min/1.73m2    GFR est non-AA >60 >60 ml/min/1.73m2    Calcium 8.5 8.3 - 10.4 MG/DL    Bilirubin, total 0.3 0.2 - 1.1 MG/DL    ALT (SGPT) 16 12 - 65 U/L    AST (SGOT) 25 15 - 37 U/L    Alk. phosphatase 88 50 - 130 U/L    Protein, total 6.6 6.3 - 8.2 g/dL    Albumin 2.4 (L) 3.5 - 5.0 g/dL    Globulin 4.2 (H) 2.3 - 3.5 g/dL    A-G Ratio 0.6 (L) 1.2 - 3.5     TYPE & SCREEN    Collection Time: 05/02/22 10:26 AM   Result Value Ref Range    Crossmatch Expiration 05/05/2022,2359     ABO/Rh(D) A POSITIVE     Antibody screen NEG    LACTIC ACID    Collection Time: 05/02/22 12:06 PM   Result Value Ref Range    Lactic acid 2.3 (H) 0.4 - 2.0 MMOL/L       All Micro Results     Procedure Component Value Units Date/Time    CULTURE, BLOOD [[id number]] Collected: 05/02/22 1206    Order Status: Completed Specimen: Blood Updated: 05/02/22 1254    CULTURE, BLOOD [[id number]] Collected: 05/02/22 1219    Order Status: Completed Specimen: Blood Updated: 05/02/22 1254    COVID-19 RAPID TEST [[id number]]     Order Status: Sent     CULTURE, URINE [[id number]]     Order Status: Sent Specimen: Urine from Clean catch     CULTURE, RESPIRATORY/SPUTUM/BRONCH Elonda Locker STAIN [[id number]]     Order Status: Sent Specimen: Sputum     COVID-19,INFLUENZA A/B,RSV PANEL [[id number]]     Order Status: Sent Specimen: Nasopharyngeal           Other Studies:  XR CHEST PA LAT    Result Date: 5/1/2022  EXAM: XR CHEST PA LAT INDICATION: SOB, rule out pulmonary edema COMPARISON: Chest radiograph, 4/30/2020 FINDINGS: The cardiomediastinal silhouette is within normal limits. No focal parenchymal process. No pleural effusion. No pneumothorax. No acute osseous abnormality. No acute cardiopulmonary abnormality.      CT CHEST PULMONARY EMBOLISM    Result Date: 5/2/2022  EXAMINATION: CT CHEST- PULMONARY ANGIOGRAM 5/2/2022 11:03 AM ACCESSION NUMBER: [id number] INDICATION: R/O pulmonary embolisn chest pain severe shortness of breath cough fever patient 32 weeks pregnant COMPARISON: Chest x-ray 5/1/2022 TECHNIQUE: Multiple contiguous axial CT images of the chest were obtained from the lung apices to the lung bases after the intravenous administration of 100 cc Isovue-370 contrast material per pulmonary angiography protocol. Coronal reconstructions were performed. Radiation dose reduction techniques were used for this study:  Our CT scanners use one or all of the following: Automated exposure control, adjustment of the mA and/or kVp according to patient's size, iterative reconstruction. FINDINGS: PULMONARY ARTERIES: No evidence of pulmonary embolism. THORACIC AORTA: Unremarkable PROXIMAL GREAT VESSELS: Unremarkable HEART: Unremarkable PERICARDIUM: Unremarkable MEDIASTINAL LYMPH NODES: Unremarkable HILAR LYMPH NODES: Unremarkable AXILLARY LYMPH NODES: Unremarkable PULMONARY PARENCHYMA: There are dense areas of consolidation in the medial lower lobes bilaterally and minimally in the posterior right middle lobe and lingula. The large central airways are predominantly clear. PLEURA: No pneumothorax. Trace bilateral pleural effusions. VISUALIZED UPPER ABDOMEN: There is no free intraperitoneal gas in the included portions of the upper abdomen. OSSEOUS STRUCTURES: No suspicious lytic or blastic bony lesions. 1. No evidence of pulmonary embolism. 2. New dense dependent consolidations in both lower lobes, with mild involvement of the right middle lobe and lingula. Given the rapid appearance since the 5/1/2022 chest x-ray and the anatomic location, differential considerations favor large volume aspiration over an infectious pneumonia. 3. Trace bilateral pleural effusions. Signed:   Ender Blackman DO    Part of this note may have been written by using a voice dictation software. The note has been proof read but may still contain some grammatical/other typographical errors.

## 2022-05-02 NOTE — PROGRESS NOTES
Assisted patient to use bedpan, patient felt warm to touch. Temp taken patient reports SOB returning w/pain 6/10 in middle upper back. Patient voided 225ml clear/yellow urine. Lungs clear to auscultation. Resp 22.     0934 Dr Erica Blanco updated will be down to access.

## 2022-05-02 NOTE — PROGRESS NOTES
Patient desating 89-91% on 2.5L NC, no reports of SOB at this time. Resp 18. Lungs clear. O2 increased to 3L NC. Dr Olimpia Cancino notified in [address].

## 2022-05-02 NOTE — PROGRESS NOTES
Dr. Fredrick Flowers notified of patient's O2 sat of 86%. Orders received for nasal cannula at 2 L/min received.

## 2022-05-02 NOTE — PROGRESS NOTES
SPO2 93% on highflow, Dr Aden Mcpherson made aware. Respiratory paged to floor for further assessment.

## 2022-05-02 NOTE — PROGRESS NOTES
1430 Per Dr Sonali Fofana titrate O2 until SPO2 >95%. 1505 Despite patient on Non-rebreather mask @10L. SPO2 94%. Respiratory notified. Dr Sonali Fofana aware and orders updated to high flow NC with humidity. Respiratory to setup.

## 2022-05-02 NOTE — PROGRESS NOTES
Chart reviewed - patient admitted with  labor and SOB at 32.6. SW met with patient while social distancing with appropriate PPE. Discussed how patient is coping with hospitalization - support offered. Patient denied any needs from  at this time. SW will continue to follow.     ASHISH Lawson, [address] [facility]   [phone]

## 2022-05-02 NOTE — PROGRESS NOTES
Ante Partum High Risk Pregnancy Note    PATIENT: Destini Jama  MRN: [id number]      Patient admitted for SOB and back pain pt also noted to have  contractions states she does have SOB- worsening, worsening scapular back pain worse with deep breaths, now with low grade fever and worsening lower extremety labs. Vitals: Temp (24hrs), Av.9 °F (37.2 °C), Min:98.2 °F (36.8 °C), Max:99.9 °F (37.7 °C)   Patient Vitals for the past 24 hrs:   BP   22 0931 131/73   22 0915 (!) 160/88   22 0900 (!) 158/88   22 0855 (!) 156/85   22 0812 (!) 146/79   22 0802 (!) 146/82   22 0746 (!) 142/81   22 0729 (!) 160/86   22 0553 127/73   22 0309 133/72   22 1924 (!) 118/59   22 1852 127/73   22 1842 119/65   22 1832 116/61   22 1827 (!) 107/59   22 1812 (!) 148/78       I&O:    07 -  1900  In: -   Out: 225 [Urine:225]             1901 -  0700  In: 700 [P.O.:700]  Out: 850 [Urine:850]    Exam:  Patient with distress.                Abdomen: soft, non-tender               Fundus: soft and non tender                              Right Upper Quadrant: non-tender               Perineum: No sign of blood or amniotic fluid               Lower Extremities: 2+ pitting edema to knees left greater than right - pt reports worsening               Patellar Reflexes: 1+ bilaterally                            Fetal Monitoring:  Baseline: 180's moderate variability  bpm   Uterine Activity: Frequency: Every 2 minutes    FT 50 high  Pt with worsening shortness of breath and worsening back pain that she reported on representation to ob triage  L-CTAB to bases shallow  Fetal tachycardia since low grade temp  o2 sat 93% on 3 liters           Labs:   Recent Results (from the past 24 hour(s))   CBC W/O DIFF    Collection Time: 22  6:16 PM   Result Value Ref Range    WBC 16.2 (H) 4.3 - 11.1 K/uL    RBC 3.51 (L) 4.05 - 5.2 M/uL    HGB 9.4 (L) 11.7 - 15.4 g/dL    HCT 29.5 (L) 35.8 - 46.3 %    MCV 84.0 79.6 - 97.8 FL    MCH 26.8 26.1 - 32.9 PG    MCHC 31.9 31.4 - 35.0 g/dL    RDW 13.1 11.9 - 14.6 %    PLATELET 805 406 - 023 K/uL    MPV 11.5 9.4 - 12.3 FL    ABSOLUTE NRBC 0.04 0.0 - 0.2 K/uL   BUN    Collection Time: 05/01/22  6:16 PM   Result Value Ref Range    BUN 8 6 - 23 MG/DL   CREATININE    Collection Time: 05/01/22  6:16 PM   Result Value Ref Range    Creatinine 0.69 0.6 - 1.0 MG/DL   HEPATIC FUNCTION PANEL    Collection Time: 05/01/22  6:16 PM   Result Value Ref Range    Protein, total 6.8 6.3 - 8.2 g/dL    Albumin 2.6 (L) 3.5 - 5.0 g/dL    Globulin 4.2 (H) 2.3 - 3.5 g/dL    A-G Ratio 0.6 (L) 1.2 - 3.5      Bilirubin, total 0.2 0.2 - 1.1 MG/DL    Bilirubin, direct 0.1 <0.4 MG/DL    Alk. phosphatase 81 50 - 130 U/L    AST (SGOT) 33 15 - 37 U/L    ALT (SGPT) 15 12 - 65 U/L   PROTEIN/CREATININE RATIO, URINE    Collection Time: 05/01/22  6:16 PM   Result Value Ref Range    Protein, urine random 12 mg/dL    Creatinine, urine 55.00 mg/dL    Protein/Creat.  urine Ratio 0.2     TSH 3RD GENERATION    Collection Time: 05/01/22  6:16 PM   Result Value Ref Range    TSH 3.710 uIU/mL   T4, FREE    Collection Time: 05/01/22  6:16 PM   Result Value Ref Range    T4, Free 0.8 0.78 - 1.46 NG/DL   POC URINE DIPSTICK MANUAL    Collection Time: 05/01/22  6:30 PM   Result Value Ref Range    Protein (POC) Negative Negative    Glucose, urine (POC) Negative Negative    Ketones (POC) Negative Negative   EKG, 12 LEAD, INITIAL    Collection Time: 05/01/22  6:44 PM   Result Value Ref Range    Ventricular Rate 77 BPM    Atrial Rate 77 BPM    P-R Interval 160 ms    QRS Duration 80 ms    Q-T Interval 362 ms    QTC Calculation (Bezet) 409 ms    Calculated P Axis 73 degrees    Calculated R Axis 60 degrees    Calculated T Axis 33 degrees    Diagnosis       Normal sinus rhythm with sinus arrhythmia  Normal ECG  When compared with ECG of 30-APR-2020 14:05,  Criteria for Septal infarct are no longer Present  Nonspecific T wave abnormality now evident in Inferior leads         Assessment and Plan:      Principal Problem:    Threatened  labor, third trimester (2022)    Active Problems:    Abdominal pain during pregnancy in third trimester (2022)      Shortness of breath during pregnancy (2022)          concern for PE given worsening SOB and scapular pain and edema  D/c Alberto  Will get CT scan to r/o PE - stat  Dose of lovenox  CBC  BNp  CMP  Uric acid     SCD on  Recent leukocytosis thought to be related to steroids- CXR clear no evidence of chorio or pulmonary infection however pt continues to contract without change in cervix  Had EKG on admission- normal  No p/c ratio no evidence of HELLP

## 2022-05-02 NOTE — PROGRESS NOTES
Patient called out [phone] reporting chest pressure returned w/moderate upper back pain 6/10; sharp. Patient slouched down in bed. Patient anxious, breathing heavily \"I just don't know. \" Patient mom at bedside. Patient report same feeling during night and chest pressure relieved with stadol. Physical assessment completed. Vitals in chart. Lungs clear to auscultation, bilateral pedal/ankle edema noted +2/pitting. Patient reports treatment of anxiety just prior to pregnancy x1 year noting was seen by psychiatrist and prescribed veniflexine. 2561 Intervention completed, pain relieved per patient. Dr Annell Duane updated. 0800 Patient drowsy, alert to voice. Vitals in chart. O2 sat  Reports no pain now. Patient O2 sat 86-90% RA despite repositioning. Spoke with Dr Erica Blanco- patient placed on 2L O2 via NC.    0839 SPO2 desating. Patient reclined in bed again. Patient states \"I just feel retired,\" noting \"I'm not in pain when I dont think about it. \" O2 increased to 2.5, Dr Erica Blanco updated of situation. 0847 O2 increased to 3L, provider aware. Mae Li

## 2022-05-02 NOTE — PROGRESS NOTES
Recent Results (from the past 12 hour(s))   NT-PRO BNP    Collection Time: 05/02/22 10:26 AM   Result Value Ref Range    NT pro-BNP 1,288 (H) 5 - 125 PG/ML   CBC WITH AUTOMATED DIFF    Collection Time: 05/02/22 10:26 AM   Result Value Ref Range    WBC 21.4 (H) 4.3 - 11.1 K/uL    RBC 3.64 (L) 4.05 - 5.2 M/uL    HGB 9.7 (L) 11.7 - 15.4 g/dL    HCT 30.4 (L) 35.8 - 46.3 %    MCV 83.5 79.6 - 97.8 FL    MCH 26.6 26.1 - 32.9 PG    MCHC 31.9 31.4 - 35.0 g/dL    RDW 13.2 11.9 - 14.6 %    PLATELET 751 234 - 898 K/uL    MPV 11.2 9.4 - 12.3 FL    ABSOLUTE NRBC 0.03 0.0 - 0.2 K/uL    DF AUTOMATED      NEUTROPHILS 81 (H) 43 - 78 %    LYMPHOCYTES 7 (L) 13 - 44 %    MONOCYTES 6 4.0 - 12.0 %    EOSINOPHILS 0 (L) 0.5 - 7.8 %    BASOPHILS 0 0.0 - 2.0 %    IMMATURE GRANULOCYTES 5 0.0 - 5.0 %    ABS. NEUTROPHILS 17.4 (H) 1.7 - 8.2 K/UL    ABS. LYMPHOCYTES 1.6 0.5 - 4.6 K/UL    ABS. MONOCYTES 1.3 0.1 - 1.3 K/UL    ABS. EOSINOPHILS 0.1 0.0 - 0.8 K/UL    ABS. BASOPHILS 0.1 0.0 - 0.2 K/UL    ABS. IMM. GRANS. 1.0 (H) 0.0 - 0.5 K/UL   METABOLIC PANEL, COMPREHENSIVE    Collection Time: 05/02/22 10:26 AM   Result Value Ref Range    Sodium 142 136 - 145 mmol/L    Potassium 3.3 (L) 3.5 - 5.1 mmol/L    Chloride 110 (H) 98 - 107 mmol/L    CO2 23 21 - 32 mmol/L    Anion gap 9 7 - 16 mmol/L    Glucose 90 65 - 100 mg/dL    BUN 10 6 - 23 MG/DL    Creatinine 0.69 0.6 - 1.0 MG/DL    GFR est AA >60 >60 ml/min/1.73m2    GFR est non-AA >60 >60 ml/min/1.73m2    Calcium 8.5 8.3 - 10.4 MG/DL    Bilirubin, total 0.3 0.2 - 1.1 MG/DL    ALT (SGPT) 16 12 - 65 U/L    AST (SGOT) 25 15 - 37 U/L    Alk.  phosphatase 88 50 - 130 U/L    Protein, total 6.6 6.3 - 8.2 g/dL    Albumin 2.4 (L) 3.5 - 5.0 g/dL    Globulin 4.2 (H) 2.3 - 3.5 g/dL    A-G Ratio 0.6 (L) 1.2 - 3.5     TYPE & SCREEN    Collection Time: 05/02/22 10:26 AM   Result Value Ref Range    Crossmatch Expiration 05/05/2022,2359     ABO/Rh(D) A POSITIVE     Antibody screen NEG    No images are attached to the encounter or orders placed in the encounter\. rad  CT scan showed aspiration pneumonia-  Discussed with pt  Start unasyn  Keep o2 on  Consult hospitalist  NPO for now  Pt now recounts eating chicken last night and states it felt stuck and then started having back pain SOB  No pain meds for now  Docert aware and will be coming to see her  Fetal tachycardia related to temp and stress but vey reassuing

## 2022-05-03 ENCOUNTER — APPOINTMENT (OUTPATIENT)
Dept: GENERAL RADIOLOGY | Age: 22
DRG: 831 | End: 2022-05-03
Attending: INTERNAL MEDICINE
Payer: OTHER GOVERNMENT

## 2022-05-03 ENCOUNTER — APPOINTMENT (OUTPATIENT)
Dept: NON INVASIVE DIAGNOSTICS | Age: 22
DRG: 831 | End: 2022-05-03
Attending: INTERNAL MEDICINE
Payer: OTHER GOVERNMENT

## 2022-05-03 LAB
ALBUMIN SERPL-MCNC: 2.1 G/DL (ref 3.5–5)
ALBUMIN/GLOB SERPL: 0.6 {RATIO} (ref 1.2–3.5)
ALP SERPL-CCNC: 79 U/L (ref 50–130)
ALT SERPL-CCNC: 17 U/L (ref 12–65)
ANION GAP SERPL CALC-SCNC: 5 MMOL/L (ref 7–16)
AST SERPL-CCNC: 22 U/L (ref 15–37)
B PERT DNA SPEC QL NAA+PROBE: NOT DETECTED
BACTERIA SPEC CULT: NORMAL
BASOPHILS # BLD: 0.1 K/UL (ref 0–0.2)
BASOPHILS NFR BLD: 1 % (ref 0–2)
BILIRUB SERPL-MCNC: 0.3 MG/DL (ref 0.2–1.1)
BORDETELLA PARAPERTUSSIS PCR, BORPAR: NOT DETECTED
BUN SERPL-MCNC: 6 MG/DL (ref 6–23)
C PNEUM DNA SPEC QL NAA+PROBE: NOT DETECTED
CALCIUM SERPL-MCNC: 7.9 MG/DL (ref 8.3–10.4)
CHLORIDE SERPL-SCNC: 110 MMOL/L (ref 98–107)
CO2 SERPL-SCNC: 26 MMOL/L (ref 21–32)
CREAT SERPL-MCNC: 0.52 MG/DL (ref 0.6–1)
DIFFERENTIAL METHOD BLD: ABNORMAL
ECHO AO ASC DIAM: 2.6 CM
ECHO AO ROOT DIAM: 2.4 CM
ECHO AV AREA PEAK VELOCITY: 1.8 CM2
ECHO AV AREA VTI: 1.7 CM2
ECHO AV MEAN GRADIENT: 7 MMHG
ECHO AV MEAN VELOCITY: 1.3 M/S
ECHO AV PEAK GRADIENT: 13 MMHG
ECHO AV PEAK VELOCITY: 1.8 M/S
ECHO AV VELOCITY RATIO: 0.67
ECHO AV VTI: 40.4 CM
ECHO EST RA PRESSURE: 3 MMHG
ECHO IVC EXP: 1.5 CM
ECHO LA AREA 2C: 14.6 CM2
ECHO LA AREA 4C: 18.8 CM2
ECHO LA DIAMETER: 3.4 CM
ECHO LA MAJOR AXIS: 5.3 CM
ECHO LA MINOR AXIS: 5.2 CM
ECHO LA TO AORTIC ROOT RATIO: 1.42
ECHO LA VOL BP: 43 ML (ref 22–52)
ECHO LV E' LATERAL VELOCITY: 15 CM/S
ECHO LV E' SEPTAL VELOCITY: 12 CM/S
ECHO LV EDV A2C: 111 ML
ECHO LV EDV A4C: 109 ML
ECHO LV EJECTION FRACTION A2C: 55 %
ECHO LV EJECTION FRACTION A4C: 55 %
ECHO LV EJECTION FRACTION BIPLANE: 56 % (ref 55–100)
ECHO LV ESV A2C: 50 ML
ECHO LV ESV A4C: 49 ML
ECHO LV FRACTIONAL SHORTENING: 27 % (ref 28–44)
ECHO LV INTERNAL DIMENSION DIASTOLIC: 4.8 CM (ref 3.9–5.3)
ECHO LV INTERNAL DIMENSION SYSTOLIC: 3.5 CM
ECHO LV IVSD: 0.9 CM (ref 0.6–0.9)
ECHO LV MASS 2D: 147.8 G (ref 67–162)
ECHO LV POSTERIOR WALL DIASTOLIC: 0.9 CM (ref 0.6–0.9)
ECHO LV RELATIVE WALL THICKNESS RATIO: 0.38
ECHO LVOT AREA: 2.5 CM2
ECHO LVOT AV VTI INDEX: 0.66
ECHO LVOT DIAM: 1.8 CM
ECHO LVOT MEAN GRADIENT: 4 MMHG
ECHO LVOT PEAK GRADIENT: 6 MMHG
ECHO LVOT PEAK VELOCITY: 1.2 M/S
ECHO LVOT SV: 67.7 ML
ECHO LVOT VTI: 26.6 CM
ECHO MV A VELOCITY: 0.99 M/S
ECHO MV E DECELERATION TIME (DT): 225 MS
ECHO MV E VELOCITY: 1.54 M/S
ECHO MV E/A RATIO: 1.56
ECHO MV E/E' LATERAL: 10.27
ECHO MV E/E' RATIO (AVERAGED): 11.55
ECHO MV E/E' SEPTAL: 12.83
ECHO MV EROA PISA: 6.7 CM2
ECHO MV REGURGITANT ALIASING (NYQUIST) VELOCITY: 23 CM/S
ECHO MV REGURGITANT PEAK GRADIENT: 117 MMHG
ECHO MV REGURGITANT PEAK VELOCITY: 5.4 M/S
ECHO MV REGURGITANT RADIUS PISA: 0.5 CM
ECHO MV REGURGITANT VOLUME PISA: 1290.6 ML
ECHO MV REGURGITANT VTIA: 193 CM
ECHO PULMONARY ARTERY END DIASTOLIC PRESSURE: 10 MMHG
ECHO PV ACCELERATION TIME (AT): 137 MS
ECHO PV MAX VELOCITY: 1.2 M/S
ECHO PV PEAK GRADIENT: 6 MMHG
ECHO PV REGURGITANT MAX VELOCITY: 1.6 M/S
ECHO RIGHT VENTRICULAR SYSTOLIC PRESSURE (RVSP): 32 MMHG
ECHO RV BASAL DIMENSION: 3.7 CM
ECHO RV INTERNAL DIMENSION: 2.6 CM
ECHO RV TAPSE: 1.9 CM (ref 1.7–?)
ECHO TV REGURGITANT MAX VELOCITY: 2.71 M/S
ECHO TV REGURGITANT PEAK GRADIENT: 29 MMHG
EOSINOPHIL # BLD: 0.2 K/UL (ref 0–0.8)
EOSINOPHIL NFR BLD: 1 % (ref 0.5–7.8)
ERYTHROCYTE [DISTWIDTH] IN BLOOD BY AUTOMATED COUNT: 13.2 % (ref 11.9–14.6)
FLUAV SUBTYP SPEC NAA+PROBE: NOT DETECTED
FLUBV RNA SPEC QL NAA+PROBE: NOT DETECTED
GLOBULIN SER CALC-MCNC: 3.7 G/DL (ref 2.3–3.5)
GLUCOSE SERPL-MCNC: 99 MG/DL (ref 65–100)
HADV DNA SPEC QL NAA+PROBE: NOT DETECTED
HCOV 229E RNA SPEC QL NAA+PROBE: NOT DETECTED
HCOV HKU1 RNA SPEC QL NAA+PROBE: NOT DETECTED
HCOV NL63 RNA SPEC QL NAA+PROBE: NOT DETECTED
HCOV OC43 RNA SPEC QL NAA+PROBE: NOT DETECTED
HCT VFR BLD AUTO: 26.6 % (ref 35.8–46.3)
HGB BLD-MCNC: 8.8 G/DL (ref 11.7–15.4)
HMPV RNA SPEC QL NAA+PROBE: NOT DETECTED
HPIV1 RNA SPEC QL NAA+PROBE: NOT DETECTED
HPIV2 RNA SPEC QL NAA+PROBE: NOT DETECTED
HPIV3 RNA SPEC QL NAA+PROBE: NOT DETECTED
HPIV4 RNA SPEC QL NAA+PROBE: NOT DETECTED
IMM GRANULOCYTES # BLD AUTO: 0.9 K/UL (ref 0–0.5)
IMM GRANULOCYTES NFR BLD AUTO: 5 % (ref 0–5)
LYMPHOCYTES # BLD: 1.4 K/UL (ref 0.5–4.6)
LYMPHOCYTES NFR BLD: 8 % (ref 13–44)
M PNEUMO DNA SPEC QL NAA+PROBE: NOT DETECTED
MCH RBC QN AUTO: 27 PG (ref 26.1–32.9)
MCHC RBC AUTO-ENTMCNC: 33.1 G/DL (ref 31.4–35)
MCV RBC AUTO: 81.6 FL (ref 79.6–97.8)
MONOCYTES # BLD: 1.2 K/UL (ref 0.1–1.3)
MONOCYTES NFR BLD: 7 % (ref 4–12)
NEUTS SEG # BLD: 14.2 K/UL (ref 1.7–8.2)
NEUTS SEG NFR BLD: 79 % (ref 43–78)
NRBC # BLD: 0.02 K/UL (ref 0–0.2)
PLATELET # BLD AUTO: 206 K/UL (ref 150–450)
PMV BLD AUTO: 10.8 FL (ref 9.4–12.3)
POTASSIUM SERPL-SCNC: 3.7 MMOL/L (ref 3.5–5.1)
PROCALCITONIN SERPL-MCNC: 19.32 NG/ML (ref 0–0.49)
PROT SERPL-MCNC: 5.8 G/DL (ref 6.3–8.2)
RBC # BLD AUTO: 3.26 M/UL (ref 4.05–5.2)
RSV RNA SPEC QL NAA+PROBE: NOT DETECTED
RV+EV RNA SPEC QL NAA+PROBE: NOT DETECTED
SARS-COV-2 PCR, COVPCR: NOT DETECTED
SERVICE CMNT-IMP: NORMAL
SODIUM SERPL-SCNC: 141 MMOL/L (ref 136–145)
WBC # BLD AUTO: 17.9 K/UL (ref 4.3–11.1)

## 2022-05-03 PROCEDURE — 76819 FETAL BIOPHYS PROFIL W/O NST: CPT | Performed by: OBSTETRICS & GYNECOLOGY

## 2022-05-03 PROCEDURE — 74011250636 HC RX REV CODE- 250/636: Performed by: OBSTETRICS & GYNECOLOGY

## 2022-05-03 PROCEDURE — 92610 EVALUATE SWALLOWING FUNCTION: CPT

## 2022-05-03 PROCEDURE — 74011000258 HC RX REV CODE- 258: Performed by: OBSTETRICS & GYNECOLOGY

## 2022-05-03 PROCEDURE — 99232 SBSQ HOSP IP/OBS MODERATE 35: CPT | Performed by: OBSTETRICS & GYNECOLOGY

## 2022-05-03 PROCEDURE — 74011000250 HC RX REV CODE- 250: Performed by: OBSTETRICS & GYNECOLOGY

## 2022-05-03 PROCEDURE — 77010033678 HC OXYGEN DAILY

## 2022-05-03 PROCEDURE — 99233 SBSQ HOSP IP/OBS HIGH 50: CPT | Performed by: OBSTETRICS & GYNECOLOGY

## 2022-05-03 PROCEDURE — 85025 COMPLETE CBC W/AUTO DIFF WBC: CPT

## 2022-05-03 PROCEDURE — 59025 FETAL NON-STRESS TEST: CPT | Performed by: OBSTETRICS & GYNECOLOGY

## 2022-05-03 PROCEDURE — 76820 UMBILICAL ARTERY ECHO: CPT | Performed by: OBSTETRICS & GYNECOLOGY

## 2022-05-03 PROCEDURE — 0202U NFCT DS 22 TRGT SARS-COV-2: CPT

## 2022-05-03 PROCEDURE — 94640 AIRWAY INHALATION TREATMENT: CPT

## 2022-05-03 PROCEDURE — 76816 OB US FOLLOW-UP PER FETUS: CPT | Performed by: OBSTETRICS & GYNECOLOGY

## 2022-05-03 PROCEDURE — 99356 PR PROLONGED SVC I/P OR OBS SETTING 1ST HOUR: CPT | Performed by: OBSTETRICS & GYNECOLOGY

## 2022-05-03 PROCEDURE — 80053 COMPREHEN METABOLIC PANEL: CPT

## 2022-05-03 PROCEDURE — 65270000029 HC RM PRIVATE

## 2022-05-03 PROCEDURE — 84145 PROCALCITONIN (PCT): CPT

## 2022-05-03 PROCEDURE — 36415 COLL VENOUS BLD VENIPUNCTURE: CPT

## 2022-05-03 PROCEDURE — 71045 X-RAY EXAM CHEST 1 VIEW: CPT

## 2022-05-03 PROCEDURE — 93306 TTE W/DOPPLER COMPLETE: CPT

## 2022-05-03 PROCEDURE — 99223 1ST HOSP IP/OBS HIGH 75: CPT | Performed by: INTERNAL MEDICINE

## 2022-05-03 PROCEDURE — 74011250637 HC RX REV CODE- 250/637: Performed by: OBSTETRICS & GYNECOLOGY

## 2022-05-03 PROCEDURE — 77010033711 HC HIGH FLOW OXYGEN

## 2022-05-03 PROCEDURE — 94760 N-INVAS EAR/PLS OXIMETRY 1: CPT

## 2022-05-03 RX ORDER — OXYCODONE HYDROCHLORIDE 5 MG/1
5 TABLET ORAL
Status: DISCONTINUED | OUTPATIENT
Start: 2022-05-03 | End: 2022-05-04 | Stop reason: HOSPADM

## 2022-05-03 RX ORDER — CYCLOBENZAPRINE HCL 10 MG
10 TABLET ORAL
Status: DISCONTINUED | OUTPATIENT
Start: 2022-05-03 | End: 2022-05-04 | Stop reason: HOSPADM

## 2022-05-03 RX ORDER — LEVALBUTEROL INHALATION SOLUTION 0.63 MG/3ML
0.63 SOLUTION RESPIRATORY (INHALATION)
Status: DISCONTINUED | OUTPATIENT
Start: 2022-05-03 | End: 2022-05-04 | Stop reason: HOSPADM

## 2022-05-03 RX ORDER — ADHESIVE BANDAGE
30 BANDAGE TOPICAL
Status: DISCONTINUED | OUTPATIENT
Start: 2022-05-03 | End: 2022-05-04 | Stop reason: HOSPADM

## 2022-05-03 RX ORDER — AMOXICILLIN AND CLAVULANATE POTASSIUM 875; 125 MG/1; MG/1
1 TABLET, FILM COATED ORAL 2 TIMES DAILY WITH MEALS
Status: DISCONTINUED | OUTPATIENT
Start: 2022-05-03 | End: 2022-05-04 | Stop reason: HOSPADM

## 2022-05-03 RX ADMIN — CYCLOBENZAPRINE 10 MG: 10 TABLET, FILM COATED ORAL at 00:16

## 2022-05-03 RX ADMIN — SODIUM CHLORIDE, PRESERVATIVE FREE 10 ML: 5 INJECTION INTRAVENOUS at 15:17

## 2022-05-03 RX ADMIN — AMOXICILLIN AND CLAVULANATE POTASSIUM 1 TABLET: 875; 125 TABLET, FILM COATED ORAL at 17:18

## 2022-05-03 RX ADMIN — LEVALBUTEROL HYDROCHLORIDE 0.63 MG: 0.63 SOLUTION RESPIRATORY (INHALATION) at 13:39

## 2022-05-03 RX ADMIN — LEVOTHYROXINE SODIUM 37.5 MCG: 0.07 TABLET ORAL at 08:54

## 2022-05-03 RX ADMIN — LEVALBUTEROL HYDROCHLORIDE 0.63 MG: 0.63 SOLUTION RESPIRATORY (INHALATION) at 09:00

## 2022-05-03 RX ADMIN — CYCLOBENZAPRINE 10 MG: 10 TABLET, FILM COATED ORAL at 15:10

## 2022-05-03 RX ADMIN — SODIUM CHLORIDE 3 G: 900 INJECTION INTRAVENOUS at 00:07

## 2022-05-03 RX ADMIN — OXYCODONE 10 MG: 5 TABLET ORAL at 07:47

## 2022-05-03 RX ADMIN — SODIUM CHLORIDE 3 G: 900 INJECTION INTRAVENOUS at 06:02

## 2022-05-03 RX ADMIN — MAGNESIUM HYDROXIDE 30 ML: 2400 SUSPENSION ORAL at 07:56

## 2022-05-03 RX ADMIN — SODIUM CHLORIDE, PRESERVATIVE FREE 10 ML: 5 INJECTION INTRAVENOUS at 06:02

## 2022-05-03 RX ADMIN — SODIUM CHLORIDE 3 G: 900 INJECTION INTRAVENOUS at 12:27

## 2022-05-03 RX ADMIN — IRON SUCROSE 200 MG: 20 INJECTION, SOLUTION INTRAVENOUS at 11:52

## 2022-05-03 RX ADMIN — OXYCODONE 5 MG: 5 TABLET ORAL at 22:07

## 2022-05-03 NOTE — PROGRESS NOTES
SPEECH LANGUAGE PATHOLOGY: DYSPHAGIA  Initial Assessment and Discharge    NAME/AGE/GENDER: Walt Barney is a 23 y.o. female  DATE: 5/3/2022  PRIMARY DIAGNOSIS: Threatened  labor, third trimester [O47.03]  Hypoxia [R09.02]  Aspiration pneumonia due to food (regurgitated) ([facility] [address] [J69.0]  Aspiration pneumonia ([address] [J69.0]      ICD-10: Treatment Diagnosis: R13.12 Dysphagia, Oropharyngeal Phase    RECOMMENDATIONS   DIET:    Regular Consistency   Thin Liquids    MEDICATIONS: as tolerated     ASPIRATION PRECAUTIONS  · Slow rate of intake  · Small bites/sips  · Upright at 90 degrees during meal     COMPENSATORY STRATEGIES/MODIFICATIONS  · Take frequent rest breaks with po intake if short of breath     EDUCATION:  · Recommendations discussed with Family  · Patient     CONTINUATION OF SKILLED SERVICES/MEDICAL NECESSITY:   No dysphagia goals identified as swallow function is within normal limits. RECOMMENDATIONS for CONTINUED SPEECH THERAPY: No further speech therapy indicated at this time. ASSESSMENT   Patient presents with oropharyngeal swallowing abilities that are WNL. No overt s/sx airway compromise with solids or liquids. Reports choking episode occurred when she attempted to eat when short of breath after walking up a flight of steps. Denies any history of dysphagia. Suspect this was an isolated incident. Recommend continue regular diet/thin liquids. Meds as tolerated. No further speech therapy needs at this time. REHABILITATION POTENTIAL FOR STATED GOALS: Excellent    PLAN    FREQUENCY/DURATION: No further speech therapy indicated at this time as oropharyngeal swallow function is within normal limits. SUBJECTIVE   Alert upright in bed for assessment. Now on room air. Pleasant and friendly.    History of Present Injury/Illness: Ms. Aj Bob  has a past medical history of Goiter, Hashimoto's thyroiditis, Irritable bowel syndrome with constipation, Major depressive disorder, single episode with anxious distress (10/11/2018), and Vitamin D deficiency. She has no past medical history of Abnormal Papanicolaou smear of cervix, Anemia, Arrhythmia, Arthritis, Asthma, Breast disorder, CAD (coronary artery disease), Calculus of kidney, Cancer ([address] Chlamydia, Chronic kidney disease, Chronic obstructive pulmonary disease ([address] Chronic pain, Complication of anesthesia, Congestive heart failure ([address] Diabetes ([address] Disease of blood and blood forming organ, Epilepsy ([address] Essential hypertension, Genital herpes, GERD (gastroesophageal reflux disease), Gestational diabetes, Gestational hypertension, Gonorrhea, Headache, Heart abnormality, Herpes gestationis, Herpes simplex virus (HSV) infection, Human immunodeficiency virus (HIV) disease ([address] Hypercholesterolemia, Hypertension, Infertility, female, Kidney disease, Liver disease, Phlebitis and thrombophlebitis, Pituitary disorder ([address] Polycystic disease, ovaries, Postpartum depression, PUD (peptic ulcer disease), Rhesus isoimmunization affecting pregnancy, Seizures ([address] Sickle cell disease ([address] Sickle cell trait syndrome ([address] Stroke ([address] Syphilis, Systemic lupus erythematosus ([address] Thromboembolus ([address] or Trauma. . She also  has a past surgical history that includes hx tonsillectomy; hx orthopaedic (age 1); hx gyn (03/17/2020); and hx colonoscopy (2020). Problem List:  (Impairments causing functional limitations):  1. Oropharyngeal dysphagia- No symptoms identified    Previous Dysphagia: YES Reports choking episode prior to admission. States she was walking up stairs and was short of breath.  She then immediately began eating a \"slim georgia\" and choked  Diet Prior to Evaluation: Regular/thin    Orientation:   Person  Place  Time  Situation      Pain: Pain Scale 1: Numeric (0 - 10)  Pain Intensity 1: 0  Pain Location 1: Back  Pain Intervention(s) 1: Medication (see MAR)    OBJECTIVE   Oral Motor:   · Labial: No impairment  · Dentition: Intact and Natural  · Oral Hygiene: Adequate  · Lingual: No impairment    Swallow evaluation:   Patient presented with thin liquid via straw/serial sips and mixed fruit. Appropriate oral prep with all textures. Timely swallow initiation, and single swallows upon palpation. Adequate oral clearing. No overt signs or symptoms of airway compromise observed with liquid or solid textures. INTERDISCIPLINARY COLLABORATION: Registered Nurse  PRECAUTIONS/ALLERGIES: Patient has no known allergies. Tool Used: Dysphagia Outcome and Severity Scale (MELISSA)    Score Comments   Normal Diet  [x] 7 With no strategies or extra time needed   Functional Swallow  [] 6 May have mild oral or pharyngeal delay   Mild Dysphagia  [] 5 Which may require one diet consistency restricted    Mild-Moderate Dysphagia  [] 4 With 1-2 diet consistencies restricted   Moderate Dysphagia  [] 3 With 2 or more diet consistencies restricted   Moderate-Severe Dysphagia  [] 2 With partial PO strategies (trials with ST only)   Severe Dysphagia  [] 1 With inability to tolerate any PO safely      Score:  Initial: 7 Most Recent: x (Date 05/03/22 )   Interpretation of Tool: The Dysphagia Outcome and Severity Scale (MELISSA) is a simple, easy-to-use, 7-point scale developed to systematically rate the functional severity of dysphagia based on objective assessment and make recommendations for diet level, independence level, and type of nutrition. Current Medications:   No current facility-administered medications on file prior to encounter. Current Outpatient Medications on File Prior to Encounter   Medication Sig Dispense Refill    NIFEdipine (PROCARDIA) 10 mg capsule Take 3 Capsules by mouth three (3) times daily. Indications: early labor 60 Capsule 1    levothyroxine (SYNTHROID) 25 mcg tablet Take one and a half tablet by oral route once daily 45 Tablet 4    prenatal vit 91/iron/folic/dha (PRENATAL + DHA PO) Take  by mouth.          SAFETY:  After treatment position/precautions:  · Upright in bed  ·  at bedside  · Call light within reach    Total Treatment Duration:   Time In: 1243  Time Out: 12 [facility] [place] 06, [address] [facility]

## 2022-05-03 NOTE — PROGRESS NOTES
Report to Dr. Vannesa Moran regarding pt's complaints of pain; new orders received. Also given update on O2 therapy; RT recently decreased HiFlo settings. Orders received to change Xopenex to PRN.

## 2022-05-03 NOTE — PROGRESS NOTES
Patient reports feeling SOB following BM and ambulation. SPO2 97%RA. Diminished expiratory breath sounds noted. PRN 2L O2 NC started. Respiratory made aware as xopenex to early to give now. [phone] with respiratory, directed to encourage patient to continue to use incentive spirometer now and oncoming RT will be made aware. This staff then re-educated again patient on proper IS and encouraged patient to continue its use. Patient verbalized understanding, demonstrated proper use and agreed to use.

## 2022-05-03 NOTE — PROGRESS NOTES
[facility] Antepartum Progress Note    23 y.o.  at 33w1d by Estimated Date of Delivery: 22. Patient admitted for shortness of breath and pneumonia. She complains of continued peripheral edema. Shortness of breath is much better. No cough. Hypoxia much better. Off O2 at this time. Has had 2 bowel movements now. No further cramping. Patient denies HA, abdominal pain, vision changes. No regular contractions, LOF, VB. Good FM. Minimal edema. No chest pain or shortness of breath. No significant reflux, nausea, constipation, or other GI complaints. ROS per HPI, otherwise unremarkable.     Current Facility-Administered Medications:     iron sucrose (VENOFER) 200 mg in 0.9% sodium chloride 100 mL IVPB, 200 mg, IntraVENous, Q24H, Nate Dominguez MD    magnesium hydroxide (MILK OF MAGNESIA) 400 mg/5 mL oral suspension 30 mL, 30 mL, Oral, BID PRN, Nate Dominguez MD    oxyCODONE IR (ROXICODONE) tablet 5 mg, 5 mg, Oral, Q6H PRN, Nate Dominguez MD    cyclobenzaprine (FLEXERIL) tablet 10 mg, 10 mg, Oral, TID PRN, Nate Dominguez MD    ampicillin-sulbactam (UNASYN) 3 g in 0.9% sodium chloride (MBP/ADV) 100 mL MBP, 3 g, IntraVENous, Q6H, Letha Marquez DO, Last Rate: 200 mL/hr at 22 0602, 3 g at 22 0602    levothyroxine (SYNTHROID) tablet 37.5 mcg, 37.5 mcg, Oral, ACB, Nate Dominguez MD, 37.5 mcg at 22 0854    polyethylene glycol (MIRALAX) packet 17 g, 17 g, Oral, Q6H PRN, Nate Dominguez MD, 17 g at 22 1615    acetaminophen (TYLENOL) tablet 1,000 mg, 1,000 mg, Oral, Q6H PRN, Nate Dominguez MD, 1,000 mg at 22 2154    levalbuterol (XOPENEX) nebulizer soln 0.63 mg/3 mL, 0.63 mg, Nebulization, Q4H PRN, Nate Dominguez MD, 0.63 mg at 22 0900    zolpidem (AMBIEN) tablet 5 mg, 5 mg, Oral, QHS PRN, Nate Dominguez MD    sodium chloride (NS) flush 5-40 mL, 5-40 mL, IntraVENous, Q8H, Jay Henao MD, 5 mL at 22 1400    sodium chloride (NS) flush 5-40 mL, 5-40 mL, IntraVENous, PRN, Zakia Winter MD, 10 mL at 22 0602    Vitals:    Patient Vitals for the past 24 hrs:   BP   22 0932 119/71   22 0836 116/77   22 0732 127/80   22 0610 117/62   22 0301 129/69   22 0201 130/72   22 0101 125/67   22 0001 121/66   22 2301 126/70   22 2201 (!) 113/59   22 2101 125/61   22 1801 (!) 145/71   22 1704 133/68   22 1601 138/72   22 1529 133/66   22 1417 139/78   22 1402 127/75   22 1332 132/69   22 1317 129/65   22 1302 132/62   22 1233 136/71   22 1217 (!) 147/76   22 1204 (!) 167/78   22 1148 134/77   22 1132 (!) 141/75   22 1119 (!) 150/78   22 1031 (!) 143/74   22 1017 (!) 179/98   22 1000 (!) 147/74     Temp (24hrs), Av.8 °F (37.1 °C), Min:97.8 °F (36.6 °C), Max:99.4 °F (37.4 °C)      I&O:  No intake/output data recorded.  1901 -  0700  In: 2100 [P.O.:1800; I.V.:300]  Out: 4250 [Urine:4250]    Exam:   Constitutional: Patient without distress. HEENT: Symmetric without facial palsy, uncorrected poor hearing or uncorrected poor vision. No thyroid enlargement or goiter  Chest: No use of accessory muscles or excessive work of breathing  Abdomen: gravid, soft, non-tender. Fundus: soft and non tender  Genitourinary: deferred as without complaints of labor or ROM  Cervical Exam: Dilation (cm):  (FT) Eff: 60 %  Membranes: Membrane Status: Intact  Lower Extremities:  Edema: 2+ bilaterally  Skin: normal coloration and turgor, no rashes, no suspicious skin lesions noted. Neurologic: AOx3. Gait normal. Reflexes and motor strength normal and symmetric. Cranial nerves 2-12 and sensation grossly intact.   Psychiatric: non focal    Maternal and Fetal Monitoring:                              Uterine Activity: Frequency (min): none Intensity: [place] Adjusted Fetal Heart Rate: Mode: US ReadjustedFetal Heart Rate: 145      Labs:  CBC:    Recent Labs     22  0623 22  1026 22  18122  19022  0947 22  0929 21  1129 21  0000 21  0916 21  1937   WBC 17.9* 21.4* 16.2* 12.6*  --  10.2 7.1  --  4.9 6.8   HGB 8.8* 9.7* 9.4* 10.5* 10.6* 11.0* 11.8  --  13.9 11.8   HCT 26.6* 30.4* 29.5* 32.3*  --  33.4* 36.1  --  42.6 35.3*    231 256 242  --  239 260  --  268 279   HGBEXT  --   --   --   --   --   --   --  11.8  --   --    HCTEXT  --   --   --   --   --   --   --  36.1  --   --    PLTEXT  --   --   --   --   --   --   --  260  --   --      CMP:   Recent Labs     22  0623 22  1026 22  19022  0929 21  0916 21  1937    142  --  137 134 138 142   K 3.7 3.3*  --  3.7 3.8 3.8 3.8   * 110*  --  105 103 104 108*   CO2 26 23  --  23 21 30 29   AGAP 5* 9  --  9  --  4* 5*   GLU 99 90  --  84 110* 104* 75   BUN 6 10 8 7 5* 12 8   CREA 0.52* 0.69 0.69 0.44* 0.32* 0.53* 0.46*   GFRAA >60 >60  --  >60 183 >60 >60   GFRNA >60 >60  --  >60 159 >60 >60   CA 7.9* 8.5  --  9.3 8.5* 9.1 8.7   ALB 2.1* 2.4* 2.6* 2.4* 3.5* 4.4  --    TP 5.8* 6.6 6.8 6.9 6.3 8.2  --    GLOB 3.7* 4.2* 4.2* 4.5*  --  3.8*  --    AGRAT 0.6* 0.6* 0.6* 0.5* 1.3 1.2  --    ALT 17 16 15 12 20 20  --        Recent Labs     22  1920 22  09   URICA  --  3.4   LDH  --  163     --        Recent Glucose Results:   Recent Labs     22  0623 22  1026 22  1909 22  0929 21  0916 21  1937   GLU 99 90 84 110* 104* 75     A/P: 23 y.o.  at 33w1d      Principal Problem:    Aspiration pneumonia due to food (regurgitated) ([address] (2022)    Active Problems:    Threatened  labor, third trimester (2022)      Abdominal pain during pregnancy in third trimester (2022)      Shortness of breath during pregnancy (2022)      Aspiration pneumonia ([facility] [address] (5/2/2022)      Sepsis due to anaerobic streptococci ([facility] [address] (5/2/2022)      Acute respiratory failure with hypoxia (HCC) (5/2/2022)      Hypoxia (5/2/2022)    1) Pneumonia- improving  Continue abx (transition to po)  Ambulate and monitor oxygenation  Breathing treatment as needed  Flexeril prn back pain   Cx/respiratory panel pending to ensure not 2 separate issues. 2) Anemia- IV iron course (venofer today and tomorrow); add on iron studies. 3) PTL- secondary to constipation; treat constipation as needed. 4) Hypertension with proteinuria- unclear if secondary to stress of illness vs preeclampsia. Will do twice weekly testing for remainder. If further BP elevations, delivery at 37wk. Otherwise, continue expectant care. 5) Dependent edema- ambulation, hydration. Does not appear pathologic. Await echo results. Ultrasound by MFM today. Time: 95 Minutes spent on floor,with greater than 50% of the time examining patient, explaining plan and coordinating care with nurse and requesting primary physician.

## 2022-05-03 NOTE — PROGRESS NOTES
Patient reports fatigue reports increase in breathing effort. Resp 22 Sating between 93-95%RA. Scattered wheezing noted. Patient placed back on O2 2L NC. Respirtatory paged for PRN neb treatment.

## 2022-05-03 NOTE — PROGRESS NOTES
Xopenex given at this time. Decreased Airvo HFNC to 40L and 45%. Will obtain a different sat probe for better readings as well. RN aware of changes.

## 2022-05-03 NOTE — PROGRESS NOTES
Respiratory seen patient, neb tx given high jordin reduced to RA. Per respiratory patient may now ambulate to restroom on RA without O2 thearpy. This staff assisted patient to restroom. 7450 Patient voided/BM. Assisted patient back to bed. Patient reports no SOB or difficulty breathing. SPO2 95-96%RA. Respiratory consulted.  Per respiratory may leave patient on RA and if sat begin to drop place patient on 2L NC.

## 2022-05-03 NOTE — PROGRESS NOTES
Hospitalist Progress Note   Admit Date:  2022  5:51 PM   Name:  Chai Dunham   Age:  23 y.o. Sex:  female  :  2000   MRN:  [id number]   Room:  [facility]    Presenting Complaint: Swelling, Headache, and Contractions    Reason(s) for Admission: Threatened  labor, third trimester [O47.03]  Hypoxia [R09.02]  Aspiration pneumonia due to food (regurgitated) ([address] [J69.0]  Aspiration pneumonia [facility]) [J69.0]     Hospital Course & Interval History:     Chai Dunham is a 23 y.o. female with history of hypothyroidism admitted to the Ob/Gyn service for shortness of breath and back pain. She is currently 33 weeks pregnant. Hospitalist service consulted for shortness of breath and for aspiration pneumonia. Patient says \"I was really hungry yesterday and I think I just ate some chicken too fast.\" She did have a coughing spell. She says she has also been having much more swelling in her lower extremities the past day, she says \"I had trouble walking up stairs because my toes were so fat. \" She does have some chest discomfort along her sternum (patient pointed) that radiates to her back and her left arm. She has never had this before. Her pain is exacerbated by \"breathing in and out\" and is relieved \"when I had some cold water. \" No fevers but was given APAP for temperature of 99.9F. No history of childhood asthma No history of heart problems but says \"heart disease does run in my family. \"      Patient with unrevealing CXR and CT chest that is negative for PE but does show small bilateral consolidations/effusions. Started on Unasyn and placed on 3L NC. Subjective/24hr Events (22): Patient examined at bedside. Was placed on Airvo last night but weaned all the way to room air today. Feels much better today. Denies fevers/chills, chest pain. Does still have some swelling in her legs but it is unchanged. Denies worsening of shortness of breath. Spouse at bedside.      ROS:  10 systems reviewed and negative except as noted above. Assessment & Plan:     Principal Problem:    Threatened  labor, third trimester (2022)  - per primary     Active Problems:    Abdominal pain during pregnancy in third trimester (2022)   - per primary        Shortness of breath during pregnancy (2022)  - improved overall and weaned to room air  - most likely due to aspiration pneumonia  - pulmonary consulted  - agree with 7-day course of empiric Augmentin   - CT chest negative for PE  - can stay on Unasyn while inpatient  - TTE with preserved EF and with some tricuspid regurgitation which is not currently concerning        Aspiration pneumonia ([facility] [address] (2022)  - blood cultures x2 with NGTD  - agree with Unasyn empirically   - RPP is negative (including COVID)       Sepsis due to anaerobic streptococci ([facility] (2022)   - as above       Acute respiratory failure with hypoxia ([facility] [facility] (2022)  - resolved overall  - management as above  - wean supplemental oxygen as above  - goal SpO2>90%  - jet nebs as needed     Ppx: Lovenox for VTE     Code Status: FULL CODE     Thank you for this consult. IM Hospitalist will sign off. Please page/call with questions or concerns. Discussed above with patient and spouse at bedside. All questions answered.      Hospital Problems as of 5/3/2022 Date Reviewed: 2022          Codes Class Noted - Resolved POA    Aspiration pneumonia ([facility] ICD-10-CM: J69.0  ICD-9-CM: 507.0  2022 - Present Unknown        Sepsis due to anaerobic streptococci ([facility] ICD-10-CM: A40.8  ICD-9-CM: 038.0  2022 - Present Unknown        Acute respiratory failure with hypoxia [facility]) ICD-10-CM: J96.01  ICD-9-CM: 518.81  2022 - Present Unknown        * (Principal) Aspiration pneumonia due to food (regurgitated) ([facility] ICD-10-CM: J69.0  ICD-9-CM: 507.0  2022 - Present Unknown        Hypoxia ICD-10-CM: R09.02  ICD-9-CM: 799.02  2022 - Present Unknown        Abdominal pain during pregnancy in third trimester ICD-10-CM: O26.893, R10.9  ICD-9-CM: 646.83, 789.00  2022 - Present Yes        Shortness of breath during pregnancy ICD-10-CM: O99.891, R06.02  ICD-9-CM: 646.83, 786.05  2022 - Present Unknown        Threatened  labor, third trimester ICD-10-CM: O47.03  ICD-9-CM: 644.03  2022 - Present Yes              Objective:     Patient Vitals for the past 24 hrs:   Temp Pulse Resp BP SpO2   22 1308 -- -- -- -- 95 %   22 1306 -- -- -- -- 93 %   22 1305 -- -- -- -- 94 %   22 1302 -- -- -- -- 95 %   22 1300 -- -- -- -- 96 %   22 1258 -- -- -- -- 95 %   22 1256 -- -- -- -- 93 %   22 1254 -- -- -- -- 95 %   22 1252 -- -- -- -- 96 %   22 1250 -- -- -- -- 96 %   22 1248 -- -- -- -- 98 %   22 1228 -- -- -- -- 96 %   22 1226 -- -- -- -- 95 %   22 1219 -- -- -- -- 95 %   22 1217 -- -- -- -- 95 %   22 1215 -- -- -- -- 94 %   22 1213 -- -- -- -- 95 %   22 1211 -- -- -- -- 96 %   22 1209 -- -- -- -- 96 %   22 1207 -- -- -- -- 97 %   22 1205 -- -- -- -- 95 %   22 1203 -- -- -- -- 95 %   22 1201 -- -- -- -- 96 %   22 1159 -- -- -- -- 97 %   22 1157 -- -- -- -- 97 %   22 1155 -- 86 -- 138/83 100 %   22 1153 -- -- -- -- 98 %   22 1149 -- -- -- -- 93 %   22 1147 -- -- -- -- 97 %   22 1145 -- -- -- -- 95 %   22 1144 -- -- -- -- 94 %   22 1141 -- -- -- -- 96 %   22 1139 -- -- -- -- 97 %   22 1137 -- -- -- -- 97 %   22 1135 -- -- -- -- 97 %   22 1133 -- -- -- -- 95 %   22 1129 -- -- -- -- 96 %   22 1128 -- -- -- -- 94 %   22 1125 -- -- -- -- 96 %   22 1123 -- -- -- -- 96 %   22 1114 -- -- -- -- 95 %   22 1113 -- -- -- -- 94 %   22 1112 -- -- -- -- 95 %   22 1110 -- -- -- -- 95 %   22 1108 -- -- -- -- 95 %   22 1106 -- -- -- -- 95 %   05/03/22 1104 -- -- -- -- 94 %   05/03/22 1103 -- -- -- -- 92 %   05/03/22 1100 -- -- -- -- 96 %   05/03/22 1058 -- -- -- -- 96 %   05/03/22 1056 -- -- -- -- 95 %   05/03/22 1054 -- -- -- -- 95 %   05/03/22 1052 -- -- -- -- 96 %   05/03/22 1050 -- -- -- -- 96 %   05/03/22 1048 -- -- -- -- 96 %   05/03/22 1047 -- -- -- -- 94 %   05/03/22 1044 -- -- -- -- 97 %   05/03/22 1042 -- -- -- -- 97 %   05/03/22 1031 -- -- -- -- 96 %   05/03/22 1030 -- -- -- -- 93 %   05/03/22 1027 -- -- -- -- 96 %   05/03/22 1025 -- -- -- -- 96 %   05/03/22 1023 -- -- -- -- 97 %   05/03/22 1022 -- -- -- -- 92 %   05/03/22 1019 -- -- -- -- 95 %   05/03/22 1017 -- -- -- -- 96 %   05/03/22 1015 -- -- -- -- 95 %   05/03/22 1013 -- -- -- -- 96 %   05/03/22 1011 -- -- -- -- 97 %   05/03/22 1009 -- -- -- -- 97 %   05/03/22 1007 -- -- -- -- 96 %   05/03/22 1005 -- -- -- -- 96 %   05/03/22 1003 -- -- -- -- 96 %   05/03/22 1001 -- -- -- -- 95 %   05/03/22 0957 -- -- -- -- 96 %   05/03/22 0955 -- -- -- -- 95 %   05/03/22 0953 -- -- -- -- 96 %   05/03/22 0951 -- -- -- -- 96 %   05/03/22 0949 -- -- -- -- 96 %   05/03/22 0945 -- -- -- -- 97 %   05/03/22 0940 -- -- -- -- 100 %   05/03/22 0938 -- -- -- -- 96 %   05/03/22 0936 -- -- -- -- 95 %   05/03/22 0934 -- -- -- -- 95 %   05/03/22 0932 -- 84 -- 119/71 97 %   05/03/22 0930 -- -- -- -- 96 %   05/03/22 0928 -- -- -- -- 96 %   05/03/22 0926 -- -- -- -- 97 %   05/03/22 0924 -- -- -- -- 96 %   05/03/22 0922 -- -- -- -- 95 %   05/03/22 0920 -- -- -- -- 95 %   05/03/22 0918 -- -- -- -- 95 %   05/03/22 0916 -- -- -- -- 95 %   05/03/22 0914 -- -- -- -- 96 %   05/03/22 0910 -- -- -- -- 95 %   05/03/22 0908 -- -- -- -- 95 %   05/03/22 0906 -- -- -- -- 96 %   05/03/22 0904 -- -- -- -- 97 %   05/03/22 0902 -- -- -- -- 95 %   05/03/22 0900 -- -- -- -- 97 %   05/03/22 0858 -- -- -- -- 96 %   05/03/22 0856 -- -- -- -- 97 %   05/03/22 0855 -- -- -- -- 98 %   05/03/22 0854 -- -- -- -- 97 %   05/03/22 0852 -- -- -- -- 98 %   05/03/22 0850 -- -- -- -- 97 %   05/03/22 0848 -- -- -- -- 97 %   05/03/22 0846 -- -- -- -- 98 %   05/03/22 0840 -- -- -- -- (!) 86 %   05/03/22 0839 -- -- -- -- 95 %   05/03/22 0836 -- 81 -- 116/77 --   05/03/22 0818 -- -- -- -- 98 %   05/03/22 0813 -- -- -- -- 100 %   05/03/22 0808 -- -- -- -- 98 %   05/03/22 0803 -- -- -- -- 97 %   05/03/22 0758 -- -- -- -- 97 %   05/03/22 0753 -- -- -- -- (!) 83 %   05/03/22 0748 -- -- -- -- 100 %   05/03/22 0743 -- -- -- -- 98 %   05/03/22 0738 -- -- -- -- 97 %   05/03/22 0733 -- -- -- -- 99 %   05/03/22 0732 97.8 °F (36.6 °C) 81 18 127/80 --   05/03/22 0728 -- -- -- -- 100 %   05/03/22 0723 -- -- -- -- 98 %   05/03/22 0718 -- -- -- -- 95 %   05/03/22 0713 -- -- -- -- 99 %   05/03/22 0610 98.3 °F (36.8 °C) 90 18 117/62 97 %   05/03/22 0608 -- -- -- -- 96 %   05/03/22 0603 -- -- -- -- 95 %   05/03/22 0558 -- -- -- -- 96 %   05/03/22 0553 -- -- -- -- 95 %   05/03/22 0548 -- -- -- -- 95 %   05/03/22 0533 -- -- -- -- 95 %   05/03/22 0528 -- -- -- -- 96 %   05/03/22 0523 -- -- -- -- 95 %   05/03/22 0518 -- -- -- -- 95 %   05/03/22 0513 -- -- -- -- 94 %   05/03/22 0508 -- -- -- -- 96 %   05/03/22 0503 -- -- -- -- 94 %   05/03/22 0458 -- -- -- -- 95 %   05/03/22 0453 -- -- -- -- 95 %   05/03/22 0448 -- -- -- -- 94 %   05/03/22 0443 -- -- -- -- 95 %   05/03/22 0438 -- -- -- -- 95 %   05/03/22 0433 -- -- -- -- 95 %   05/03/22 0428 -- -- -- -- 96 %   05/03/22 0423 -- -- -- -- 95 %   05/03/22 0418 -- -- -- -- 97 %   05/03/22 0413 -- -- -- -- 97 %   05/03/22 0408 -- -- -- -- 96 %   05/03/22 0403 -- -- -- -- 97 %   05/03/22 0358 -- -- -- -- 97 %   05/03/22 0353 -- -- -- -- 97 %   05/03/22 0348 -- -- -- -- 98 %   05/03/22 0343 -- -- -- -- 98 %   05/03/22 0338 -- -- -- -- 98 %   05/03/22 0333 -- -- -- -- 97 %   05/03/22 0328 -- -- -- -- 97 %   05/03/22 0323 -- -- -- -- 97 %   05/03/22 0318 -- -- -- -- 98 %   05/03/22 0313 -- -- -- -- 98 %   05/03/22 0308 -- -- -- -- 97 %   05/03/22 0303 -- -- -- -- 97 %   05/03/22 0301 98.4 °F (36.9 °C) 86 20 129/69 --   05/03/22 0258 -- -- -- -- 97 %   05/03/22 0253 -- -- -- -- 97 %   05/03/22 0248 -- -- -- -- 97 %   05/03/22 0243 -- -- -- -- 98 %   05/03/22 0238 -- -- -- -- 97 %   05/03/22 0233 -- -- -- -- 98 %   05/03/22 0228 -- -- -- -- 96 %   05/03/22 0223 -- -- -- -- 97 %   05/03/22 0218 -- -- -- -- 98 %   05/03/22 0213 -- -- -- -- 97 %   05/03/22 0208 -- -- -- -- 97 %   05/03/22 0203 -- -- -- -- 98 %   05/03/22 0201 -- 81 -- 130/72 --   05/03/22 0158 -- -- -- -- 97 %   05/03/22 0153 -- -- -- -- 98 %   05/03/22 0148 -- -- -- -- 97 %   05/03/22 0143 -- -- -- -- 98 %   05/03/22 0138 -- -- -- -- 99 %   05/03/22 0133 -- -- -- -- 98 %   05/03/22 0128 -- -- -- -- 98 %   05/03/22 0123 -- -- -- -- 98 %   05/03/22 0118 -- -- -- -- 98 %   05/03/22 0113 -- -- -- -- 97 %   05/03/22 0107 -- -- -- -- 97 %   05/03/22 0105 -- -- -- -- 98 %   05/03/22 0103 -- -- -- -- 96 %   05/03/22 0101 -- 96 -- 125/67 97 %   05/03/22 0059 -- -- -- -- 97 %   05/03/22 0057 -- -- -- -- 96 %   05/03/22 0055 -- -- -- -- 97 %   05/03/22 0053 -- -- -- -- 98 %   05/03/22 0049 -- -- -- -- 99 %   05/03/22 0047 -- -- -- -- 98 %   05/03/22 0045 -- -- -- -- 98 %   05/03/22 0043 -- -- -- -- 98 %   05/03/22 0041 -- -- -- -- 95 %   05/03/22 0039 -- -- -- -- 97 %   05/03/22 0037 -- -- -- -- 97 %   05/03/22 0035 -- -- -- -- 98 %   05/03/22 0033 -- -- -- -- 96 %   05/03/22 0031 -- -- -- -- 98 %   05/03/22 0029 -- -- -- -- 96 %   05/03/22 0027 -- -- -- -- 96 %   05/03/22 0025 -- -- -- -- 96 %   05/03/22 0023 -- -- -- -- 96 %   05/03/22 0021 -- -- -- -- 97 %   05/03/22 0019 -- -- -- -- 97 %   05/03/22 0017 -- -- -- -- 96 %   05/03/22 0015 -- -- -- -- 98 %   05/03/22 0013 -- -- -- -- 96 %   05/03/22 0011 -- -- -- -- 97 %   05/03/22 0009 -- -- -- -- 96 %   05/03/22 0007 -- -- -- -- 98 %   05/03/22 0005 -- -- -- -- 98 %   05/03/22 0003 -- -- -- -- 97 %   05/03/22 0001 -- 93 -- 121/66 96 %   05/02/22 2359 -- -- -- -- 97 %   05/02/22 2357 -- -- -- -- 97 %   05/02/22 2355 -- -- -- -- 96 %   05/02/22 2353 -- -- -- -- 97 %   05/02/22 2351 -- -- -- -- 97 %   05/02/22 2349 -- -- -- -- 97 %   05/02/22 2347 -- -- -- -- 96 %   05/02/22 2345 -- -- -- -- 97 %   05/02/22 2343 -- -- -- -- 96 %   05/02/22 2341 -- -- -- -- 99 %   05/02/22 2339 -- -- -- -- 97 %   05/02/22 2337 -- -- -- -- 97 %   05/02/22 2335 -- -- -- -- 98 %   05/02/22 2333 -- -- -- -- 98 %   05/02/22 2331 -- -- -- -- 98 %   05/02/22 2329 -- -- -- -- 98 %   05/02/22 2327 -- -- -- -- 99 %   05/02/22 2325 -- -- -- -- 95 %   05/02/22 2323 -- -- -- -- 96 %   05/02/22 2321 -- -- -- -- 95 %   05/02/22 2319 -- -- -- -- 97 %   05/02/22 2317 -- -- -- -- 95 %   05/02/22 2315 -- -- -- -- 97 %   05/02/22 2313 -- -- -- -- 99 %   05/02/22 2311 -- -- -- -- 98 %   05/02/22 2309 -- -- -- -- 97 %   05/02/22 2307 -- -- -- -- 96 %   05/02/22 2305 -- -- -- -- 97 %   05/02/22 2303 -- -- -- -- 98 %   05/02/22 2301 -- 97 -- 126/70 97 %   05/02/22 2259 -- -- -- -- 97 %   05/02/22 2257 -- -- -- -- 99 %   05/02/22 2255 -- -- -- -- 98 %   05/02/22 2253 -- -- -- -- 98 %   05/02/22 2251 -- -- -- -- 97 %   05/02/22 2249 -- -- -- -- 98 %   05/02/22 2247 -- -- -- -- 95 %   05/02/22 2245 -- -- -- -- 97 %   05/02/22 2243 -- -- -- -- 99 %   05/02/22 2241 -- -- -- -- 98 %   05/02/22 2239 -- -- -- -- 100 %   05/02/22 2237 -- -- -- -- 99 %   05/02/22 2235 -- -- -- -- 99 %   05/02/22 2233 -- -- -- -- 100 %   05/02/22 2231 -- -- -- -- 100 %   05/02/22 2229 -- -- -- -- 100 %   05/02/22 2227 -- -- -- -- 100 %   05/02/22 2226 -- -- -- -- 100 %   05/02/22 2225 -- -- -- -- 97 %   05/02/22 2223 -- -- -- -- 99 %   05/02/22 2221 -- -- -- -- 100 %   05/02/22 2219 -- -- -- -- 100 %   05/02/22 2217 -- -- -- -- 100 %   05/02/22 2215 -- -- -- -- 100 %   05/02/22 2213 -- -- -- -- 99 %   05/02/22 2211 -- -- -- -- 99 %   05/02/22 2209 -- -- -- -- 99 %   05/02/22 2207 -- -- -- -- 97 %   05/02/22 2205 -- -- -- -- 94 % 05/02/22 2204 -- -- -- -- (!) 87 %   05/02/22 2203 -- -- -- -- 95 %   05/02/22 2201 -- 90 -- (!) 113/59 95 %   05/02/22 2159 -- -- -- -- 95 %   05/02/22 2157 -- -- -- -- 94 %   05/02/22 2155 -- -- -- -- 95 %   05/02/22 2153 -- -- -- -- 95 %   05/02/22 2151 -- -- -- -- 93 %   05/02/22 2149 -- -- -- -- 93 %   05/02/22 2147 -- -- -- -- 95 %   05/02/22 2143 -- -- -- -- 96 %   05/02/22 2141 -- -- -- -- 95 %   05/02/22 2139 -- -- -- -- 94 %   05/02/22 2138 -- -- -- -- (!) 89 %   05/02/22 2135 -- -- -- -- 98 %   05/02/22 2133 -- -- -- -- 98 %   05/02/22 2129 -- -- -- -- 98 %   05/02/22 2127 -- -- -- -- 96 %   05/02/22 2125 -- -- -- -- 98 %   05/02/22 2123 -- -- -- -- 100 %   05/02/22 2121 -- -- -- -- 100 %   05/02/22 2119 -- -- -- -- 100 %   05/02/22 2117 -- -- -- -- 100 %   05/02/22 2115 -- -- -- -- 100 %   05/02/22 2113 -- -- -- -- 100 %   05/02/22 2111 -- -- -- -- 100 %   05/02/22 2109 -- -- -- -- 100 %   05/02/22 2107 -- -- -- -- 99 %   05/02/22 2105 -- -- -- -- 99 %   05/02/22 2103 -- -- -- -- 100 %   05/02/22 2101 98.3 °F (36.8 °C) 87 18 125/61 100 %   05/02/22 2059 -- -- -- -- 100 %   05/02/22 2057 -- -- -- -- 100 %   05/02/22 1832 -- -- -- -- 99 %   05/02/22 1830 -- -- -- -- 100 %   05/02/22 1828 -- -- -- -- 100 %   05/02/22 1826 -- -- -- -- 99 %   05/02/22 1824 -- -- -- -- 100 %   05/02/22 1822 -- -- -- -- 100 %   05/02/22 1820 -- -- -- -- 100 %   05/02/22 1818 -- -- -- -- 100 %   05/02/22 1816 -- -- -- -- 100 %   05/02/22 1814 -- -- -- -- 100 %   05/02/22 1812 -- -- -- -- 100 %   05/02/22 1810 -- -- -- -- 100 %   05/02/22 1808 -- -- -- -- 99 %   05/02/22 1806 -- -- -- -- 100 %   05/02/22 1804 -- -- -- -- 100 %   05/02/22 1802 -- -- -- -- 99 %   05/02/22 1801 -- 71 18 (!) 145/71 --   05/02/22 1758 -- -- -- -- 99 %   05/02/22 1756 -- -- -- -- 100 %   05/02/22 1754 -- -- -- -- 100 %   05/02/22 1752 -- -- -- -- 100 %   05/02/22 1750 -- -- -- -- 100 %   05/02/22 1748 -- -- -- -- 98 %   05/02/22 1744 -- -- -- -- 97 % 05/02/22 1742 -- -- -- -- 93 %   05/02/22 1740 -- -- -- -- 97 %   05/02/22 1738 -- -- -- -- 100 %   05/02/22 1736 -- -- -- -- 100 %   05/02/22 1734 -- -- -- -- 98 %   05/02/22 1732 -- -- -- -- 97 %   05/02/22 1730 -- -- -- -- 100 %   05/02/22 1728 -- -- -- -- 98 %   05/02/22 1726 -- -- -- -- 97 %   05/02/22 1724 -- -- -- -- 96 %   05/02/22 1722 -- -- -- -- 100 %   05/02/22 1718 -- -- -- -- 97 %   05/02/22 1716 -- -- -- -- 95 %   05/02/22 1713 -- -- -- -- 97 %   05/02/22 1711 -- -- -- -- 97 %   05/02/22 1709 -- -- -- -- 96 %   05/02/22 1704 99.4 °F (37.4 °C) 80 20 133/68 --   05/02/22 1648 -- -- -- -- 94 %   05/02/22 1646 -- -- -- -- 96 %   05/02/22 1644 -- -- -- -- 91 %   05/02/22 1640 -- -- -- -- 94 %   05/02/22 1638 -- -- -- -- 95 %   05/02/22 1636 -- -- -- -- 92 %   05/02/22 1634 -- -- -- -- 91 %   05/02/22 1632 -- -- -- -- 91 %   05/02/22 1630 -- -- -- -- 92 %   05/02/22 1628 -- -- -- -- 92 %   05/02/22 1626 -- -- -- -- 93 %   05/02/22 1624 -- -- -- -- 92 %   05/02/22 1608 -- -- -- -- 93 %   05/02/22 1605 -- -- -- -- 94 %   05/02/22 1601 99.4 °F (37.4 °C) 91 20 138/72 --   05/02/22 1558 -- -- -- -- 93 %   05/02/22 1553 -- -- -- -- 93 %   05/02/22 1548 -- -- -- -- 95 %   05/02/22 1547 -- -- -- -- 94 %   05/02/22 1542 -- -- -- -- 94 %   05/02/22 1536 -- -- -- -- 93 %   05/02/22 1530 -- -- -- -- 95 %   05/02/22 1529 -- 87 22 133/66 --   05/02/22 1527 -- -- -- -- 93 %   05/02/22 1521 -- -- -- -- 94 %   05/02/22 1515 -- -- -- -- 94 %   05/02/22 1507 -- -- -- -- 94 %   05/02/22 1501 -- -- -- -- 94 %   05/02/22 1451 -- -- -- -- 92 %   05/02/22 1448 -- -- -- -- 92 %   05/02/22 1442 -- -- -- -- (!) 89 %   05/02/22 1435 -- -- -- -- (!) 89 %   05/02/22 1430 -- -- -- -- (!) 89 %   05/02/22 1421 -- -- -- -- (!) 89 %   05/02/22 1417 -- 81 -- 139/78 --   05/02/22 1402 -- 83 20 127/75 --   05/02/22 1348 -- -- -- -- 91 %   05/02/22 1337 -- -- -- -- (!) 89 %     Oxygen Therapy  O2 Sat (%): 95 % (05/03/22 1308)  Pulse via Oximetry: 127 beats per minute (05/03/22 0855)  O2 Device: Heated; Hi flow nasal cannula (05/03/22 0855)  Skin Assessment: Clean, dry, & intact (05/03/22 0855)  Skin Protection for O2 Device: No (05/03/22 0855)  O2 Flow Rate (L/min): 15 l/min (05/03/22 0855)  O2 Temperature: 89.6 °F (32 °C) (05/03/22 0855)  FIO2 (%): 21 % (05/03/22 0855)    Estimated body mass index is 34.37 kg/m² as calculated from the following:    Height as of 4/29/22: 5' (1.524 m). Weight as of 4/29/22: 79.8 kg (176 lb). Intake/Output Summary (Last 24 hours) at 5/3/2022 1336  Last data filed at 5/3/2022 1117  Gross per 24 hour   Intake 1400 ml   Output 2875 ml   Net -1475 ml         Physical Exam:     Blood pressure 138/83, pulse 86, temperature 97.8 °F (36.6 °C), resp. rate 18, last menstrual period 09/13/2021, SpO2 95 %, currently breastfeeding. General:    Well nourished. No overt distress  Head:  Normocephalic, atraumatic  Eyes:  Sclerae appear normal.  Pupils equally round. ENT:  Nares appear normal, no drainage. Moist oral mucosa  Neck:  No restricted ROM. Trachea midline   CV:   RRR. No jugular venous distension. Lungs:   CTAB. No wheezing, rhonchi, or rales. Respirations even, unlabored  Abdomen: Bowel sounds present. Soft, nontender, nondistended. Gravid  Extremities: No cyanosis or clubbing. No edema  Skin:     No rashes and normal coloration. Warm and dry. Neuro:  CN II-XII grossly intact. Sensation intact. A&Ox3  Psych:  Normal mood and affect.       I have reviewed ordered lab tests and independently visualized imaging below:    Recent Labs:  Recent Results (from the past 48 hour(s))   CBC W/O DIFF    Collection Time: 05/01/22  6:16 PM   Result Value Ref Range    WBC 16.2 (H) 4.3 - 11.1 K/uL    RBC 3.51 (L) 4.05 - 5.2 M/uL    HGB 9.4 (L) 11.7 - 15.4 g/dL    HCT 29.5 (L) 35.8 - 46.3 %    MCV 84.0 79.6 - 97.8 FL    MCH 26.8 26.1 - 32.9 PG    MCHC 31.9 31.4 - 35.0 g/dL    RDW 13.1 11.9 - 14.6 %    PLATELET 365 150 - 450 K/uL    MPV 11.5 9.4 - 12.3 FL    ABSOLUTE NRBC 0.04 0.0 - 0.2 K/uL   BUN    Collection Time: 05/01/22  6:16 PM   Result Value Ref Range    BUN 8 6 - 23 MG/DL   CREATININE    Collection Time: 05/01/22  6:16 PM   Result Value Ref Range    Creatinine 0.69 0.6 - 1.0 MG/DL   HEPATIC FUNCTION PANEL    Collection Time: 05/01/22  6:16 PM   Result Value Ref Range    Protein, total 6.8 6.3 - 8.2 g/dL    Albumin 2.6 (L) 3.5 - 5.0 g/dL    Globulin 4.2 (H) 2.3 - 3.5 g/dL    A-G Ratio 0.6 (L) 1.2 - 3.5      Bilirubin, total 0.2 0.2 - 1.1 MG/DL    Bilirubin, direct 0.1 <0.4 MG/DL    Alk. phosphatase 81 50 - 130 U/L    AST (SGOT) 33 15 - 37 U/L    ALT (SGPT) 15 12 - 65 U/L   PROTEIN/CREATININE RATIO, URINE    Collection Time: 05/01/22  6:16 PM   Result Value Ref Range    Protein, urine random 12 mg/dL    Creatinine, urine 55.00 mg/dL    Protein/Creat.  urine Ratio 0.2     TSH 3RD GENERATION    Collection Time: 05/01/22  6:16 PM   Result Value Ref Range    TSH 3.710 uIU/mL   T4, FREE    Collection Time: 05/01/22  6:16 PM   Result Value Ref Range    T4, Free 0.8 0.78 - 1.46 NG/DL   POC URINE DIPSTICK MANUAL    Collection Time: 05/01/22  6:30 PM   Result Value Ref Range    Protein (POC) Negative Negative    Glucose, urine (POC) Negative Negative    Ketones (POC) Negative Negative   EKG, 12 LEAD, INITIAL    Collection Time: 05/01/22  6:44 PM   Result Value Ref Range    Ventricular Rate 77 BPM    Atrial Rate 77 BPM    P-R Interval 160 ms    QRS Duration 80 ms    Q-T Interval 362 ms    QTC Calculation (Bezet) 409 ms    Calculated P Axis 73 degrees    Calculated R Axis 60 degrees    Calculated T Axis 33 degrees    Diagnosis       Normal sinus rhythm with sinus arrhythmia  Normal ECG    Confirmed by ST MEMO PHILLIPS MD (), CARLA PERALTA ([id number]) on 5/2/2022 4:51:44 PM     PROTEIN, URINE, 24 HR    Collection Time: 05/01/22  7:20 PM   Result Value Ref Range    Period of collection 24 hr    Volume 2,840 mL    Protein, urine random 11 mg/dL Protein,urine 24 hr 312 mg/24hr   NT-PRO BNP    Collection Time: 05/02/22 10:26 AM   Result Value Ref Range    NT pro-BNP 1,288 (H) 5 - 125 PG/ML   CBC WITH AUTOMATED DIFF    Collection Time: 05/02/22 10:26 AM   Result Value Ref Range    WBC 21.4 (H) 4.3 - 11.1 K/uL    RBC 3.64 (L) 4.05 - 5.2 M/uL    HGB 9.7 (L) 11.7 - 15.4 g/dL    HCT 30.4 (L) 35.8 - 46.3 %    MCV 83.5 79.6 - 97.8 FL    MCH 26.6 26.1 - 32.9 PG    MCHC 31.9 31.4 - 35.0 g/dL    RDW 13.2 11.9 - 14.6 %    PLATELET 610 175 - 818 K/uL    MPV 11.2 9.4 - 12.3 FL    ABSOLUTE NRBC 0.03 0.0 - 0.2 K/uL    DF AUTOMATED      NEUTROPHILS 81 (H) 43 - 78 %    LYMPHOCYTES 7 (L) 13 - 44 %    MONOCYTES 6 4.0 - 12.0 %    EOSINOPHILS 0 (L) 0.5 - 7.8 %    BASOPHILS 0 0.0 - 2.0 %    IMMATURE GRANULOCYTES 5 0.0 - 5.0 %    ABS. NEUTROPHILS 17.4 (H) 1.7 - 8.2 K/UL    ABS. LYMPHOCYTES 1.6 0.5 - 4.6 K/UL    ABS. MONOCYTES 1.3 0.1 - 1.3 K/UL    ABS. EOSINOPHILS 0.1 0.0 - 0.8 K/UL    ABS. BASOPHILS 0.1 0.0 - 0.2 K/UL    ABS. IMM. GRANS. 1.0 (H) 0.0 - 0.5 K/UL   METABOLIC PANEL, COMPREHENSIVE    Collection Time: 05/02/22 10:26 AM   Result Value Ref Range    Sodium 142 136 - 145 mmol/L    Potassium 3.3 (L) 3.5 - 5.1 mmol/L    Chloride 110 (H) 98 - 107 mmol/L    CO2 23 21 - 32 mmol/L    Anion gap 9 7 - 16 mmol/L    Glucose 90 65 - 100 mg/dL    BUN 10 6 - 23 MG/DL    Creatinine 0.69 0.6 - 1.0 MG/DL    GFR est AA >60 >60 ml/min/1.73m2    GFR est non-AA >60 >60 ml/min/1.73m2    Calcium 8.5 8.3 - 10.4 MG/DL    Bilirubin, total 0.3 0.2 - 1.1 MG/DL    ALT (SGPT) 16 12 - 65 U/L    AST (SGOT) 25 15 - 37 U/L    Alk.  phosphatase 88 50 - 130 U/L    Protein, total 6.6 6.3 - 8.2 g/dL    Albumin 2.4 (L) 3.5 - 5.0 g/dL    Globulin 4.2 (H) 2.3 - 3.5 g/dL    A-G Ratio 0.6 (L) 1.2 - 3.5     TYPE & SCREEN    Collection Time: 05/02/22 10:26 AM   Result Value Ref Range    Crossmatch Expiration 05/05/2022,235     ABO/Rh(D) A POSITIVE     Antibody screen NEG    PROCALCITONIN    Collection Time: 05/02/22 10:26 AM   Result Value Ref Range    Procalcitonin 0.11 0.00 - 0.49 ng/mL   TROPONIN-HIGH SENSITIVITY    Collection Time: 05/02/22 10:26 AM   Result Value Ref Range    Troponin-High Sensitivity 12.1 0 - 14 pg/mL   CULTURE, BLOOD    Collection Time: 05/02/22 12:06 PM    Specimen: Blood   Result Value Ref Range    Special Requests: RIGHT  HAND        Culture result: NO GROWTH AFTER 13 HOURS     LACTIC ACID    Collection Time: 05/02/22 12:06 PM   Result Value Ref Range    Lactic acid 2.3 (H) 0.4 - 2.0 MMOL/L   CULTURE, BLOOD    Collection Time: 05/02/22 12:19 PM    Specimen: Blood   Result Value Ref Range    Special Requests: RIGHT  HAND        Culture result: NO GROWTH AFTER 13 HOURS     URINALYSIS W/ RFLX MICROSCOPIC    Collection Time: 05/02/22  1:46 PM   Result Value Ref Range    Color YELLOW      Appearance CLEAR      Specific gravity 1.042 (H) 1.001 - 1.023      pH (UA) 6.5 5.0 - 9.0      Protein Negative NEG mg/dL    Glucose Negative mg/dL    Ketone Negative NEG mg/dL    Bilirubin Negative NEG      Blood TRACE (A) NEG      Urobilinogen 0.2 0.2 - 1.0 EU/dL    Nitrites Negative NEG      Leukocyte Esterase TRACE (A) NEG      WBC 5-10 0 /hpf    RBC 0-3 0 /hpf    Epithelial cells 3-5 0 /hpf    Bacteria 0 0 /hpf    Casts 0-3 0 /lpf   CULTURE, URINE    Collection Time: 05/02/22  1:47 PM    Specimen: Clean catch; Urine    URINE   Result Value Ref Range    Special Requests: NO SPECIAL REQUESTS      Culture result: NO GROWTH 1 DAY     COVID-19 RAPID TEST    Collection Time: 05/02/22  1:54 PM   Result Value Ref Range    Specimen source NASAL      COVID-19 rapid test Not detected NOTD     EKG, 12 LEAD, INITIAL    Collection Time: 05/02/22  2:41 PM   Result Value Ref Range    Ventricular Rate 88 BPM    Atrial Rate 88 BPM    P-R Interval 160 ms    QRS Duration 78 ms    Q-T Interval 344 ms    QTC Calculation (Bezet) 416 ms    Calculated P Axis 73 degrees    Calculated R Axis 52 degrees    Calculated T Axis 31 degrees Diagnosis       Normal sinus rhythm  Normal ECG  When compared with ECG of 01-MAY-2022 18:44,  No significant change was found  Confirmed by ST MEMO PHILLIPS MD (), CARLA PERALTA [id number]) on 5/2/2022 5:26:22 PM     CULTURE, RESPIRATORY/SPUTUM/BRONCH W GRAM STAIN    Collection Time: 05/02/22  5:01 PM    Specimen: Sputum   Result Value Ref Range    Special Requests: NO SPECIAL REQUESTS      GRAM STAIN 1 TO 40 WBCS SEEN PER OIF     GRAM STAIN 0 TO 1 EPITHELIAL CELLS SEEN PER OIF     GRAM STAIN RARE GRAM POSITIVE COCCI      GRAM STAIN RARE GRAM POSITIVE RODS      GRAM STAIN 2+ MUCUS PRESENT      Culture result:        NO GROWTH AFTER SHORT PERIOD OF INCUBATION. FURTHER RESULTS TO FOLLOW AFTER OVERNIGHT INCUBATION. CBC WITH AUTOMATED DIFF    Collection Time: 05/03/22  6:23 AM   Result Value Ref Range    WBC 17.9 (H) 4.3 - 11.1 K/uL    RBC 3.26 (L) 4.05 - 5.2 M/uL    HGB 8.8 (L) 11.7 - 15.4 g/dL    HCT 26.6 (L) 35.8 - 46.3 %    MCV 81.6 79.6 - 97.8 FL    MCH 27.0 26.1 - 32.9 PG    MCHC 33.1 31.4 - 35.0 g/dL    RDW 13.2 11.9 - 14.6 %    PLATELET 638 019 - 443 K/uL    MPV 10.8 9.4 - 12.3 FL    ABSOLUTE NRBC 0.02 0.0 - 0.2 K/uL    DF AUTOMATED      NEUTROPHILS 79 (H) 43 - 78 %    LYMPHOCYTES 8 (L) 13 - 44 %    MONOCYTES 7 4.0 - 12.0 %    EOSINOPHILS 1 0.5 - 7.8 %    BASOPHILS 1 0.0 - 2.0 %    IMMATURE GRANULOCYTES 5 0.0 - 5.0 %    ABS. NEUTROPHILS 14.2 (H) 1.7 - 8.2 K/UL    ABS. LYMPHOCYTES 1.4 0.5 - 4.6 K/UL    ABS. MONOCYTES 1.2 0.1 - 1.3 K/UL    ABS. EOSINOPHILS 0.2 0.0 - 0.8 K/UL    ABS. BASOPHILS 0.1 0.0 - 0.2 K/UL    ABS. IMM.  GRANS. 0.9 (H) 0.0 - 0.5 K/UL   METABOLIC PANEL, COMPREHENSIVE    Collection Time: 05/03/22  6:23 AM   Result Value Ref Range    Sodium 141 136 - 145 mmol/L    Potassium 3.7 3.5 - 5.1 mmol/L    Chloride 110 (H) 98 - 107 mmol/L    CO2 26 21 - 32 mmol/L    Anion gap 5 (L) 7 - 16 mmol/L    Glucose 99 65 - 100 mg/dL    BUN 6 6 - 23 MG/DL    Creatinine 0.52 (L) 0.6 - 1.0 MG/DL    GFR est AA >60 >60 ml/min/1.73m2    GFR est non-AA >60 >60 ml/min/1.73m2    Calcium 7.9 (L) 8.3 - 10.4 MG/DL    Bilirubin, total 0.3 0.2 - 1.1 MG/DL    ALT (SGPT) 17 12 - 65 U/L    AST (SGOT) 22 15 - 37 U/L    Alk.  phosphatase 79 50 - 130 U/L    Protein, total 5.8 (L) 6.3 - 8.2 g/dL    Albumin 2.1 (L) 3.5 - 5.0 g/dL    Globulin 3.7 (H) 2.3 - 3.5 g/dL    A-G Ratio 0.6 (L) 1.2 - 3.5     PROCALCITONIN    Collection Time: 05/03/22  6:23 AM   Result Value Ref Range    Procalcitonin 19.32 (H) 0.00 - 0.49 ng/mL   RESPIRATORY VIRUS PANEL W/COVID-19, PCR    Collection Time: 05/03/22  9:06 AM    Specimen: Nasopharyngeal   Result Value Ref Range    Adenovirus NOT DETECTED NOTDET      Coronavirus 229E NOT DETECTED NOTDET      Coronavirus HKU1 NOT DETECTED NOTDET      Coronavirus CVNL63 NOT DETECTED NOTDET      Coronavirus OC43 NOT DETECTED NOTDET      SARS-CoV-2, PCR NOT DETECTED NOTDET      Metapneumovirus NOT DETECTED NOTDET      Rhinovirus and Enterovirus NOT DETECTED NOTDET      Influenza A NOT DETECTED NOTDET      Influenza B NOT DETECTED NOTDET      Parainfluenza 1 NOT DETECTED NOTDET      Parainfluenza 2 NOT DETECTED NOTDET      Parainfluenza 3 NOT DETECTED NOTDET      Parainfluenza virus 4 NOT DETECTED NOTDET      RSV by PCR NOT DETECTED NOTDET      B. parapertussis, PCR NOT DETECTED NOTDET      Bordetella pertussis - PCR NOT DETECTED NOTDET      Chlamydophila pneumoniae DNA, QL, PCR NOT DETECTED NOTDET      Mycoplasma pneumoniae DNA, QL, PCR NOT DETECTED NOTDET     ECHO ADULT COMPLETE    Collection Time: 05/03/22 11:14 AM   Result Value Ref Range    LV EDV A2C 111 mL    LV EDV A4C 109 mL    LV ESV A2C 50 mL    LV ESV A4C 49 mL    IVSd 0.9 0.6 - 0.9 cm    LVIDd 4.8 3.9 - 5.3 cm    LVIDs 3.5 cm    LVOT Diameter 1.8 cm    LVOT Mean Gradient 4 mmHg    LVOT VTI 26.6 cm    LVOT Peak Velocity 1.2 m/s    LVOT Peak Gradient 6 mmHg    LVPWd 0.9 0.6 - 0.9 cm    LV E' Lateral Velocity 15 cm/s    LV E' Septal Velocity 12 cm/s    LV Ejection Fraction A2C 55 %    LV Ejection Fraction A4C 55 %    EF BP 56 55 - 100 %    LVOT Area 2.5 cm2    LVOT SV 67.7 ml    LA Minor Axis 5.2 cm    LA Major Grandy 5.3 cm    LA Area 2C 14.6 cm2    LA Area 4C 18.8 cm2    LA Volume BP 43 22 - 52 mL    LA Diameter 3.4 cm    AV Mean Gradient 7 mmHg    AV VTI 40.4 cm    AV Mean Velocity 1.3 m/s    AV Peak Velocity 1.8 m/s    AV Peak Gradient 13 mmHg    AV Area by VTI 1.7 cm2    AV Area by Peak Velocity 1.8 cm2    Aortic Root 2.4 cm    Ascending Aorta 2.6 cm    IVC Expiration 1.5 cm    MV Nyquist Velocity 23 cm/s    MR Radius PISA 0.50 cm    MR .0 cm    MR Peak Velocity 5.4 m/s    MR Peak Gradient 117 mmHg    MV E Wave Deceleration Time 225.0 ms    MV A Velocity 0.99 m/s    MV E Velocity 1.54 m/s    MV EROA PISA 6.7 cm2    WY Max Velocity 1.6 m/s    Pulmonary Artery EDP 10 mmHg    PV .0 ms    PV Max Velocity 1.2 m/s    PV Peak Gradient 6 mmHg    RVIDd 2.6 cm    RV Basal Dimension 3.7 cm    TAPSE 1.9 1.7 cm    TR Max Velocity 2.71 m/s    TR Peak Gradient 29 mmHg    Fractional Shortening 2D 27 28 - 44 %    LV RWT Ratio 0.38     LV Mass 2D 147.8 67 - 162 g    MV E/A 1.56     E/E' Ratio (Averaged) 11.55     E/E' Lateral 10.27     E/E' Septal 12.83     LA/AO Root Ratio 1.42     AV Velocity Ratio 0.67     LVOT:AV VTI Index 0.66     MR Regurg Volume PISA 1,290.60 mL    Est. RA Pressure 3 mmHg    RVSP 32 mmHg       All Micro Results     Procedure Component Value Units Date/Time    RESPIRATORY VIRUS PANEL W/COVID-19, PCR [[id number]] Collected: 05/03/22 0906    Order Status: Completed Specimen: Nasopharyngeal Updated: 05/03/22 1241     Adenovirus NOT DETECTED        Coronavirus 229E NOT DETECTED        Coronavirus HKU1 NOT DETECTED        Coronavirus CVNL63 NOT DETECTED        Coronavirus OC43 NOT DETECTED        SARS-CoV-2, PCR NOT DETECTED        Metapneumovirus NOT DETECTED        Rhinovirus and Enterovirus NOT DETECTED        Influenza A NOT DETECTED        Influenza B NOT DETECTED Parainfluenza 1 NOT DETECTED        Parainfluenza 2 NOT DETECTED        Parainfluenza 3 NOT DETECTED        Parainfluenza virus 4 NOT DETECTED        RSV by PCR NOT DETECTED        B. parapertussis, PCR NOT DETECTED        Bordetella pertussis - PCR NOT DETECTED        Chlamydophila pneumoniae DNA, QL, PCR NOT DETECTED        Mycoplasma pneumoniae DNA, QL, PCR NOT DETECTED       CULTURE, RESPIRATORY/SPUTUM/BRONCH Stiven Langley [[id number]] Collected: 05/02/22 1701    Order Status: Completed Specimen: Sputum Updated: 05/03/22 1206     Special Requests: NO SPECIAL REQUESTS        GRAM STAIN 1 TO 40 WBCS SEEN PER OIF      0 TO 1 EPITHELIAL CELLS SEEN PER OIF      RARE GRAM POSITIVE COCCI         RARE GRAM POSITIVE RODS         2+ MUCUS PRESENT        Culture result:       NO GROWTH AFTER SHORT PERIOD OF INCUBATION. FURTHER RESULTS TO FOLLOW AFTER OVERNIGHT INCUBATION.           CULTURE, URINE [[id number]] Collected: 05/02/22 1347    Order Status: Completed Specimen: Urine from Clean catch Updated: 05/03/22 0832     Special Requests: NO SPECIAL REQUESTS        Culture result: NO GROWTH 1 DAY       CULTURE, BLOOD [[id number]] Collected: 05/02/22 1206    Order Status: Completed Specimen: Blood Updated: 05/03/22 0240     Special Requests: --        RIGHT  HAND       Culture result: NO GROWTH AFTER 13 HOURS       CULTURE, BLOOD [[id number]] Collected: 05/02/22 1219    Order Status: Completed Specimen: Blood Updated: 05/03/22 0240     Special Requests: --        RIGHT  HAND       Culture result: NO GROWTH AFTER 13 HOURS       COVID-19,INFLUENZA A/B,RSV PANEL [[id number]] Collected: 05/02/22 1600    Order Status: Canceled Specimen: Nasopharyngeal     RESPIRATORY VIRUS PANEL W/COVID-19, PCR [[id number]] Collected: 05/02/22 1600    Order Status: Canceled     COVID-19 RAPID TEST [[id number]] Collected: 05/02/22 1354    Order Status: Completed Specimen: Nasopharyngeal Updated: 05/02/22 1428     Specimen source NASAL        COVID-19 rapid test Not detected        Comment:      The specimen is NEGATIVE for SARS-CoV-2, the novel coronavirus associated with COVID-19. A negative result does not rule out COVID-19. This test has been authorized by the FDA under an Emergency Use Authorization (EUA) for use by authorized laboratories. Fact sheet for Healthcare Providers: [employer]  Fact sheet for Patients: [employer]nz       Methodology: Isothermal Nucleic Acid Amplification               Other Studies:  XR CHEST SNGL V    Result Date: 5/3/2022   Portable view of the chest COMPARISON: Yesterday CLINICAL HISTORY: Suspected aspiration pneumonia, follow-up FINDINGS: There are bibasilar airspace densities. There are bilateral perihilar interstitial densities. There are tiny bilateral pleural effusions. No pneumothorax. Heart is enlarged. Mediastinal contour is within normal limits. Surrounding bones are intact. 1. Bibasilar airspace densities and bilateral perihilar interstitial densities. Differential diagnosis includes pulmonary edema and pneumonia. 2. No significant change compared to prior. ECHO ADULT COMPLETE    Result Date: 5/3/2022    Left Ventricle: Normal left ventricular systolic function with a visually estimated EF of 55 - 60%. Left ventricle size is normal. Normal wall thickness. Normal wall motion. Normal diastolic function.   Mitral Valve: Moderate regurgitation.   Tricuspid Valve: Mild to moderate paravalvular regurgitation. The estimated RVSP is 32 mmHgmmHg.   Pericardium: No pericardial effusion.        Current Meds:  Current Facility-Administered Medications   Medication Dose Route Frequency    iron sucrose (VENOFER) 200 mg in 0.9% sodium chloride 100 mL IVPB  200 mg IntraVENous Q24H    magnesium hydroxide (MILK OF MAGNESIA) 400 mg/5 mL oral suspension 30 mL  30 mL Oral BID PRN    oxyCODONE IR (ROXICODONE) tablet 5 mg  5 mg Oral Q6H PRN    cyclobenzaprine (FLEXERIL) tablet 10 mg  10 mg Oral TID PRN    levalbuterol (XOPENEX) nebulizer soln 0.63 mg/3 mL  0.63 mg Nebulization Q6H PRN    amoxicillin-clavulanate (AUGMENTIN) 875-125 mg per tablet 1 Tablet  1 Tablet Oral BID WITH MEALS    levothyroxine (SYNTHROID) tablet 37.5 mcg  37.5 mcg Oral ACB    polyethylene glycol (MIRALAX) packet 17 g  17 g Oral Q6H PRN    acetaminophen (TYLENOL) tablet 1,000 mg  1,000 mg Oral Q6H PRN    zolpidem (AMBIEN) tablet 5 mg  5 mg Oral QHS PRN    sodium chloride (NS) flush 5-40 mL  5-40 mL IntraVENous Q8H    sodium chloride (NS) flush 5-40 mL  5-40 mL IntraVENous PRN       Signed: Edgard Álvarez DO    Part of this note may have been written by using a voice dictation software. The note has been proof read but may still contain some grammatical/other typographical errors.

## 2022-05-03 NOTE — CONSULTS
History and Physical Initial Visit NOTE           5/3/2022    Delvin Rivas                        Date of Admission:  5/1/2022    The patient's chart is reviewed and the patient is discussed with the staff. Subjective:     Patient is a 23 y.o.  female seen and evaluated at the request of Dr. Abelardo Hidalgo. She has h/o hypothyroidism admitted to the Ob/Gyn service for shortness of breath and back pain. She is currently 33 weeks pregnant. Hospitalist service consulted for shortness of breath and for aspiration pneumonia. Patient says \"I was really hungry yesterday and I think I just ate some chicken too fast.\" She did have a coughing spell. She says she has also been having much more swelling in her lower extremities the past day, she says \"I had trouble walking up stairs because my toes were so fat. \" She does have some chest discomfort along her sternum (patient pointed) that radiates to her back and her left arm. She has never had this before. Her pain is exacerbated by \"breathing in and out\" and is relieved \"when I had some cold water. \" No fevers but was given APAP for temperature of 99.9F. No history of childhood asthma No history of heart problems but says \"heart disease does run in my family. \"      Patient with unrevealing CXR and CT chest that is negative for PE but does show small bilateral consolidations/effusions. Started on Unasyn and placed on 3L NC. She required AirVO yesterday afternoon and overnight and felt poorly. Nurse reports she was wheezing and given a breathing treatment. She denies any pulmonary diagnosis or h/o asthma. She does report feeling much better this morning and actually weaned to RA and walked to restroom without O2 need. She had a Tmax of 99.9 yesterday, but afebrile today. WBC is slightly better. PCT is at 19 today. BNP was 1100 and TTE is ordered for today. She notes ongoing leg swelling.      Review of Systems  A comprehensive review of systems was negative except for that written in the HPI. Prior to Admission Medications   Prescriptions Last Dose Informant Patient Reported? Taking? NIFEdipine (PROCARDIA) 10 mg capsule 5/2/2022 at Unknown time  No Yes   Sig: Take 3 Capsules by mouth three (3) times daily. Indications: early labor   levothyroxine (SYNTHROID) 25 mcg tablet 5/2/2022 at Unknown time  No Yes   Sig: Take one and a half tablet by oral route once daily   prenatal vit 91/iron/folic/dha (PRENATAL + DHA PO) 5/2/2022 at Unknown time  Yes Yes   Sig: Take  by mouth.       Facility-Administered Medications: None     Past Medical History:   Diagnosis Date    Goiter     Hashimoto's thyroiditis     Irritable bowel syndrome with constipation     Major depressive disorder, single episode with anxious distress 10/11/2018    Vitamin D deficiency      Past Surgical History:   Procedure Laterality Date    HX COLONOSCOPY  2020    HX GYN  03/17/2020    IUD placement    HX ORTHOPAEDIC  age 1    Achilles tendon lengthening    HX TONSILLECTOMY       Social History     Socioeconomic History    Marital status: LEGALLY      Spouse name: Not on file    Number of children: Not on file    Years of education: Not on file    Highest education level: Not on file   Occupational History    Not on file   Tobacco Use    Smoking status: Never Smoker    Smokeless tobacco: Never Used   Substance and Sexual Activity    Alcohol use: Never    Drug use: Not Currently    Sexual activity: Yes     Partners: Male     Birth control/protection: None   Other Topics Concern     Service Not Asked    Blood Transfusions Not Asked    Caffeine Concern Not Asked    Occupational Exposure Not Asked    Hobby Hazards Not Asked    Sleep Concern Not Asked    Stress Concern Not Asked    Weight Concern Not Asked    Special Diet Not Asked    Back Care Not Asked    Exercise Not Asked    Bike Helmet Not Asked   [address] Not Asked    Self-Exams Not Asked Social History Narrative    Not on file     Social Determinants of Health     Financial Resource Strain:     Difficulty of Paying Living Expenses: Not on file   Food Insecurity:     Worried About Running Out of Food in the Last Year: Not on file    Erin of Food in the Last Year: Not on file   Transportation Needs:     Lack of Transportation (Medical): Not on file    Lack of Transportation (Non-Medical):  Not on file   Physical Activity:     Days of Exercise per Week: Not on file    Minutes of Exercise per Session: Not on file   Stress:     Feeling of Stress : Not on file   Social Connections:     Frequency of Communication with Friends and Family: Not on file    Frequency of Social Gatherings with Friends and Family: Not on file    Attends Christianity Services: Not on file    Active Member of [address] [place] [address] [employer] or Organizations: Not on file    Attends Club or Organization Meetings: Not on file    Marital Status: Not on file   Intimate Partner Violence:     Fear of Current or Ex-Partner: Not on file    Emotionally Abused: Not on file    Physically Abused: Not on file    Sexually Abused: Not on file   Housing Stability:     Unable to Pay for Housing in the Last Year: Not on file    Number of [place] in the Last Year: Not on file    Unstable Housing in the Last Year: Not on file     Family History   Problem Relation Age of Onset    COPD Mother     Thyroid Disease Mother         hypothyroidism    Asthma Mother     Thyroid Disease Father         hyperthyroidism    Asthma Brother     Ovarian Cancer Maternal Grandmother     Heart Disease Maternal Grandmother     Dementia Paternal Grandmother     Thyroid Cancer Maternal Cousin      No Known Allergies  Current Facility-Administered Medications   Medication Dose Route Frequency    ampicillin-sulbactam (UNASYN) 3 g in 0.9% sodium chloride (MBP/ADV) 100 mL MBP  3 g IntraVENous Q6H    levothyroxine (SYNTHROID) tablet 37.5 mcg  37.5 mcg Oral ACB    enoxaparin (LOVENOX) injection 40 mg  40 mg SubCUTAneous Q24H    polyethylene glycol (MIRALAX) packet 17 g  17 g Oral Q6H PRN    magnesium hydroxide (MILK OF MAGNESIA) 400 mg/5 mL oral suspension 30 mL  30 mL Oral DAILY PRN    oxyCODONE IR (ROXICODONE) tablet 10 mg  10 mg Oral Q4H PRN    acetaminophen (TYLENOL) tablet 1,000 mg  1,000 mg Oral Q6H PRN    levalbuterol (XOPENEX) nebulizer soln 0.63 mg/3 mL  0.63 mg Nebulization Q4H PRN    zolpidem (AMBIEN) tablet 5 mg  5 mg Oral QHS PRN    sodium chloride (NS) flush 5-40 mL  5-40 mL IntraVENous Q8H    sodium chloride (NS) flush 5-40 mL  5-40 mL IntraVENous PRN     Objective:   Blood pressure 116/77, pulse 81, temperature 97.8 °F (36.6 °C), resp. rate 18, last menstrual period 09/13/2021, SpO2 98 %, currently breastfeeding. Intake/Output Summary (Last 24 hours) at 5/3/2022 0854  Last data filed at 5/3/2022 0646  Gross per 24 hour   Intake 1400 ml   Output 3000 ml   Net -1600 ml     PHYSICAL EXAM   Constitutional:  the patient is well developed and in no acute distress  EENMT:  Sclera clear, pupils equal, oral mucosa moist  Respiratory: mild rales in bases  Cardiovascular:  RRR without M,G,R  Gastrointestinal: soft and non-tender; with positive bowel sounds. Musculoskeletal: warm without cyanosis. There is 1+ lower extremity edema.   Skin:  no jaundice or rashes, no wounds   Neurologic: no gross neuro deficits     Psychiatric:  alert and oriented x 4    CXR:  Bibasilar infiltrates      CT Chest: bibasilar infiltrates      Recent Labs     05/03/22  0623 05/02/22  1206 05/02/22  1026 05/01/22  1816   WBC 17.9*  --  21.4* 16.2*   HGB 8.8*  --  9.7* 9.4*   HCT 26.6*  --  30.4* 29.5*     --  231 256   PCT 19.32*  --  0.11  --    LAC  --  2.3*  --   --      --  142  --    K 3.7  --  3.3*  --    *  --  110*  --    CO2 26  --  23  --    GLU 99  --  90  --    BUN 6  --  10 8   CREA 0.52*  --  0.69 0.69   CA 7.9*  --  8.5  --    ALB 2.1*  --  2.4* 2.6*   AST 22  --  25 33   ALT 17  --  16 15   AP 79  --  88 81   TROPHS  --   --  12.1  --    BNPNT  --   --  1,288*  --      ECHO: No results found for this or any previous visit. Results     Procedure Component Value Units Date/Time    CULTURE, RESPIRATORY/SPUTUM/BRONCH Cyndi Hong [[id number]] Collected: 05/02/22 1701    Order Status: Completed Specimen: Sputum Updated: 05/03/22 0708     Special Requests: NO SPECIAL REQUESTS        GRAM STAIN PENDING     Culture result:       NO GROWTH AFTER SHORT PERIOD OF INCUBATION. FURTHER RESULTS TO FOLLOW AFTER OVERNIGHT INCUBATION. COVID-19,INFLUENZA A/B,RSV PANEL [[id number]] Collected: 05/02/22 1600    Order Status: Canceled Specimen: Nasopharyngeal     RESPIRATORY VIRUS PANEL W/COVID-19, PCR [[id number]] Collected: 05/02/22 1600    Order Status: No result     COVID-19 RAPID TEST [[id number]] Collected: 05/02/22 1354    Order Status: Completed Specimen: Nasopharyngeal Updated: 05/02/22 1428     Specimen source NASAL        COVID-19 rapid test Not detected        Comment:      The specimen is NEGATIVE for SARS-CoV-2, the novel coronavirus associated with COVID-19. A negative result does not rule out COVID-19. This test has been authorized by the FDA under an Emergency Use Authorization (EUA) for use by authorized laboratories.         Fact sheet for Healthcare Providers: ConventionUpdate.co.nz  Fact sheet for Patients: ConventionUpdate.co.nz       Methodology: Isothermal Nucleic Acid Amplification         CULTURE, URINE [[id number]] Collected: 05/02/22 1347    Order Status: Completed Specimen: Urine from Clean catch Updated: 05/03/22 0832     Special Requests: NO SPECIAL REQUESTS        Culture result: NO GROWTH 1 DAY       CULTURE, BLOOD [[id number]] Collected: 05/02/22 1219    Order Status: Completed Specimen: Blood Updated: 05/03/22 0240     Special Requests: --        RIGHT  HAND       Culture result: NO GROWTH AFTER 13 HOURS       CULTURE, BLOOD [[id number]] Collected: 05/02/22 1206    Order Status: Completed Specimen: Blood Updated: 05/03/22 0240     Special Requests: --        RIGHT  HAND       Culture result: NO GROWTH AFTER 13 HOURS       CULTURE, URINE [[id number]] Collected: 04/29/22 2045    Order Status: Completed Specimen: Urine from Clean catch Updated: 05/02/22 0747     Special Requests: NO SPECIAL REQUESTS        Culture result:       <10,000 COLONIES/mL MIXED SKIN SAVANNAH ISOLATED          CULTURE, URINE [[id number]] Collected: 04/29/22 1915    Order Status: Canceled Specimen: Urine from [address] [facility], GENITAL GROUP B STREP [[id number]] Collected: 04/29/22 1913    Order Status: Completed Specimen: Perirectal Updated: 05/03/22 0732     Special Requests: NO SPECIAL REQUESTS        Culture result:       NO GROUP B BETA STREPTOCOCCUS ISOLATED          CULTURE, GENITAL GROUP B STREP [[id number]] Collected: 04/29/22 1912    Order Status: Canceled Specimen: VAGINAL/RECTAL     GRP B STREP SCRN BY PCR [[id number]] Collected: 04/29/22 1912    Order Status: Completed Specimen: VAGINAL/RECTAL Updated: 04/29/22 2052     Special Requests: NO SPECIAL REQUESTS        Culture result:       NEGATIVE: GBS target nucleic acid is not detected. Presumed not colonized for GBS.           CULTURE, URINE [[id number]] Collected: 04/29/22 1911    Order Status: Completed Specimen: Urine from Clean catch Updated: 05/02/22 0926     Special Requests: NO SPECIAL REQUESTS        Culture result:       50,000-100,000 COLONIES/mL MIXED SKIN SAVANNAH ISOLATED              Inpat Anti-Infectives (From admission, onward)     Start     Ordered Stop    05/02/22 1200  ampicillin-sulbactam (UNASYN) 3 g in 0.9% sodium chloride (MBP/ADV) 100 mL MBP  3 g,   IntraVENous,   EVERY 6 HOURS         05/02/22 1159 --              Assessment and Plan:  (Medical Decision Making)   Principal Problem:    Aspiration pneumonia ([address] (5/2/2022)    Most likely cause of decompensation given low grade temps, PCT and infiltrate appearance on CT. Would finish 7 days with Augmentin at discharge, which could be this afternoon presuming she continues to have no further O2 requirement, able to walk and TTE is not concerning. Active Problems:    Threatened  labor, third trimester (2022)    Per OB      Shortness of breath during pregnancy (2022)    Due to aspiration pneumonia. Acute respiratory failure with hypoxia ([address] (2022)    Due to aspiration pneumonia/pneumonitis. Given PCT elevation would favor finishing abx course x 7 days with Augmentin. Reasonable to get TTE ordered given leg swelling, + BNP and rales. Full Code    More than 50% of the time documented was spent in face-to-face contact with the patient and in the care of the patient on the floor/unit where the patient is located. Thank you very much for this referral.  We appreciate the opportunity to participate in this patient's care. Will follow along with above stated plan.     Nila Michel MD

## 2022-05-04 VITALS
HEART RATE: 75 BPM | RESPIRATION RATE: 19 BRPM | DIASTOLIC BLOOD PRESSURE: 76 MMHG | OXYGEN SATURATION: 100 % | SYSTOLIC BLOOD PRESSURE: 127 MMHG | TEMPERATURE: 97.8 F

## 2022-05-04 LAB
BACTERIA SPEC CULT: NORMAL
SERVICE CMNT-IMP: NORMAL

## 2022-05-04 PROCEDURE — 74011000250 HC RX REV CODE- 250: Performed by: OBSTETRICS & GYNECOLOGY

## 2022-05-04 PROCEDURE — 74011000258 HC RX REV CODE- 258: Performed by: OBSTETRICS & GYNECOLOGY

## 2022-05-04 PROCEDURE — 99239 HOSP IP/OBS DSCHRG MGMT >30: CPT | Performed by: OBSTETRICS & GYNECOLOGY

## 2022-05-04 PROCEDURE — 74011250636 HC RX REV CODE- 250/636: Performed by: OBSTETRICS & GYNECOLOGY

## 2022-05-04 PROCEDURE — 94760 N-INVAS EAR/PLS OXIMETRY 1: CPT

## 2022-05-04 PROCEDURE — 59025 FETAL NON-STRESS TEST: CPT | Performed by: OBSTETRICS & GYNECOLOGY

## 2022-05-04 PROCEDURE — 74011250637 HC RX REV CODE- 250/637: Performed by: OBSTETRICS & GYNECOLOGY

## 2022-05-04 PROCEDURE — 94640 AIRWAY INHALATION TREATMENT: CPT

## 2022-05-04 RX ORDER — AMOXICILLIN AND CLAVULANATE POTASSIUM 875; 125 MG/1; MG/1
1 TABLET, FILM COATED ORAL 2 TIMES DAILY WITH MEALS
Qty: 12 TABLET | Refills: 0 | Status: SHIPPED | OUTPATIENT
Start: 2022-05-04 | End: 2022-05-10

## 2022-05-04 RX ADMIN — SODIUM CHLORIDE, PRESERVATIVE FREE 10 ML: 5 INJECTION INTRAVENOUS at 08:02

## 2022-05-04 RX ADMIN — IRON SUCROSE 200 MG: 20 INJECTION, SOLUTION INTRAVENOUS at 10:50

## 2022-05-04 RX ADMIN — LEVOTHYROXINE SODIUM 37.5 MCG: 0.07 TABLET ORAL at 07:59

## 2022-05-04 RX ADMIN — LEVALBUTEROL HYDROCHLORIDE 0.63 MG: 0.63 SOLUTION RESPIRATORY (INHALATION) at 06:20

## 2022-05-04 RX ADMIN — AMOXICILLIN AND CLAVULANATE POTASSIUM 1 TABLET: 875; 125 TABLET, FILM COATED ORAL at 07:59

## 2022-05-04 NOTE — PROGRESS NOTES
Problem: Falls - Risk of  Goal: *Absence of Falls  Description: Document June Del Rio Fall Risk and appropriate interventions in the flowsheet.   Outcome: Progressing Towards Goal  Note: Fall Risk Interventions:            Medication Interventions: Patient to call before getting OOB    Elimination Interventions: Call light in reach

## 2022-05-04 NOTE — DISCHARGE INSTRUCTIONS
Patient Education        Aspiration Pneumonia: Care Instructions  Your Care Instructions     Aspiration pneumonia is an inflammation of the lungs. It may occur after you breathe in large amounts of foreign material, such as food, liquid, vomit, or mucus. Aspiration may happen because of a health problem that makes it hard to swallow. These problems include stroke or seizure. Pneumonia makes it hard to breathe. Follow-up care is a key part of your treatment and safety. Be sure to make and go to all appointments, and call your doctor if you are having problems. It's also a good idea to know your test results and keep a list of the medicines you take. How can you care for yourself at home? To help with swallowing   · You may need to do exercises to train your muscles to work together to help you swallow. You may also need to learn how to position your body or how to put food in your mouth to be able to swallow better. · You may need to change the foods you eat. Your doctor may tell you to eat certain foods and liquids to make swallowing easier. · You may need to change how you prepare foods. For example, you may need to add thickeners to some liquids, or puree certain foods in a . To help with pneumonia   · Take your antibiotics as directed. Do not stop taking them just because you feel better. You need to take the full course of antibiotics. · Take your medicines exactly as prescribed. For example, your doctor may have given you medicine that makes breathing easier. Call your doctor if you think you are having a problem with your medicine. · Get plenty of rest and sleep. You may feel weak and tired for a while, but your energy level will improve with time. · Take care of your cough so you can rest. A cough that brings up mucus from your lungs is common with pneumonia. It is one way your body gets rid of the infection.  But if coughing keeps you from resting or causes severe fatigue and chest-wall pain, talk to your doctor. He or she may suggest that you take a medicine to reduce the cough. · Use a humidifier to increase the moisture in the air. Dry air makes coughing worse. Follow the instructions for cleaning the machine. · Do not smoke, and avoid others' smoke. Smoke will make your cough last longer. If you need help quitting, talk to your doctor about stop-smoking programs and medicines. These can increase your chances of quitting for good. · Take an over-the-counter pain medicine, such as acetaminophen (Tylenol), ibuprofen (Advil, Motrin), or naproxen (Aleve) to help reduce fever and reduce chest pain caused by coughing. Read and follow all instructions on the label. · Do not take two or more pain medicines at the same time unless the doctor told you to. Many pain medicines have acetaminophen, which is Tylenol. Too much acetaminophen (Tylenol) can be harmful. When should you call for help? Call 911 anytime you think you may need emergency care. For example, call if:    · You have severe trouble breathing. Call your doctor now or seek immediate medical care if:    · You have a new or higher fever.     · You have new or worse trouble breathing.     · You cough up blood.     · You are dizzy or lightheaded, or you feel like you may faint. Watch closely for changes in your health, and be sure to contact your doctor if:    · You do not get better as expected.     · You are coughing more deeply or more often. Where can you learn more? Go to http://www.[employer].com/  Enter D757 in the search box to learn more about \"Aspiration Pneumonia: Care Instructions. \"  Current as of: July 6, 2021               Content Version: 13.2  © 2806-4421 [employer]. Care instructions adapted under license by [employer] (which disclaims liability or warranty for this information).  If you have questions about a medical condition or this instruction, always ask your healthcare professional. [address] any warranty or liability for your use of this information.

## 2022-05-04 NOTE — DISCHARGE SUMMARY
Antepartum Discharge Summary     Name: Nallely Greenberg MRN: [id number]  SSN: [id number]    YOB: 2000  Age: 23 y.o. Sex: female      Allergies: Patient has no known allergies. Admit Date: 2022    Discharge Date: 2022     Admitting Physician: Tommy Corey DO     Attending Physician:  Logan Dickerson, *     * Admission Diagnoses: Threatened  labor, third trimester [O47.03]; Hypoxia [R09.02]; Aspiration pneumonia due to food (regurgitated) ([facility] [J69.0]; Aspiration pneumonia ([facility] [J69.0]    * Discharge Diagnoses:   Hospital Problems as of 2022 Date Reviewed: 2022          Codes Class Noted - Resolved POA    Aspiration pneumonia ([facility] ICD-10-CM: J69.0  ICD-9-CM: 507.0  2022 - Present Unknown        Sepsis due to anaerobic streptococci ([facility] ICD-10-CM: A40.8  ICD-9-CM: 038.0  2022 - Present Unknown        Acute respiratory failure with hypoxia ([facility] ICD-10-CM: J96.01  ICD-9-CM: 518.81  2022 - Present Unknown        * (Principal) Aspiration pneumonia due to food (regurgitated) ([facility] ICD-10-CM: J69.0  ICD-9-CM: 507.0  2022 - Present Unknown        Hypoxia ICD-10-CM: R09.02  ICD-9-CM: 799.02  2022 - Present Unknown        Abdominal pain during pregnancy in third trimester ICD-10-CM: O26.893, R10.9  ICD-9-CM: 646.83, 789.00  2022 - Present Yes        Shortness of breath during pregnancy ICD-10-CM: O99.891, R06.02  ICD-9-CM: 646.83, 786.05  2022 - Present Unknown        Threatened  labor, third trimester ICD-10-CM: O47.03  ICD-9-CM: 644.03  2022 - Present Yes             Lab Results   Component Value Date/Time    ABO/Rh(D) A POSITIVE 2022 10:26 AM    Rubella, External immune 2021 12:00 AM    ABO,Rh A positive 2021 12:00 AM      Immunization History   Administered Date(s) Administered    Influenza Vaccine 2018, 10/20/2020    Influenza Vaccine (Quad) PF (>6 Mo Flulaval, Fluarix, and >3 Yrs Afluria, Fluzone 78406) 12/07/2017, 09/07/2018    TB Skin Test (PPD) Intradermal 03/24/2017    Tdap 08/16/2018, 04/22/2022       * Discharge Condition: stable    * Procedures:   mfm and pulmonary consult. Chest ct / labs. [facility] Course:    - pt was admitted with asp pneumonia. Receive iv antibiotics and supplemental oxygen. Had chest ct. Was noted to have proteinura. Will send home on oral antibiotics and twice weekly testing. * Disposition: Home    Discharge Medications:   Current Discharge Medication List      START taking these medications    Details   amoxicillin-clavulanate (AUGMENTIN) 875-125 mg per tablet Take 1 Tablet by mouth two (2) times daily (with meals) for 12 doses. Indications: pneumonia caused by bacteria  Qty: 12 Tablet, Refills: 0  Start date: 5/4/2022, End date: 5/10/2022      albuterol sulfate 90 mcg/actuation aebs Take 2 Puffs by inhalation four (4) times daily as needed for Wheezing. Qty: 1 Each, Refills: 1  Start date: 5/4/2022         CONTINUE these medications which have NOT CHANGED    Details   levothyroxine (SYNTHROID) 25 mcg tablet Take one and a half tablet by oral route once daily  Qty: 45 Tablet, Refills: 4      prenatal vit 91/iron/folic/dha (PRENATAL + DHA PO) Take  by mouth. STOP taking these medications       NIFEdipine (PROCARDIA) 10 mg capsule Comments:   Reason for Stopping:               * Follow-up Care/Patient Instructions:   Activity: Activity as tolerated  Diet: Regular Diet  Wound Care: None needed    Follow-up Information     Follow up With Specialties Details Why [address] [facility] , [address], [address] Ananda Brooks Physician Assistant   [address]  [facility] [address] [phone]

## 2022-05-04 NOTE — PROGRESS NOTES
Discharge instructions administered to pt. Pt verbalized understanding. Instructed pt to  prescriptions.

## 2022-05-04 NOTE — PROGRESS NOTES
Ante Partum High Risk Pregnancy Note    Patient admitted for shortness of breath states she does not  have  contractions, vaginal bleeding  and vaginal leaking of fluid . She has been using nasal cannula at night. Vitals: Temp (24hrs), Av.4 °F (36.9 °C), Min:97.8 °F (36.6 °C), Max:98.9 °F (37.2 °C)   Patient Vitals for the past 24 hrs:   BP   22 0805 127/76   22 0612 129/81   22 0433 (!) 147/81   22 0252 (!) 137/90   22 0132 124/73   22 0032 128/67   22 2353 119/65   22 2200 123/86   22 2003 124/67   22 1930 131/63   22 1833 117/62   22 1733 123/73   22 1634 126/76   22 1533 (!) 143/85   22 1332 (!) 140/82   22 1232 124/74   22 1155 138/83       I&O:   No intake/output data recorded.  1901 -  0700  In: 1400 [P.O.:1100; I.V.:300]  Out: 4900 [Urine:4900]    Exam:  Patient without distress. Heart rrr  Lungs ctab &s                Abdomen: soft, non-tender               Fundus: soft and non tender                            Perineum: No sign of blood or amniotic fluid               Lower Extremities: 2+                          NST:  reactive           Lab/Data Review: All lab results for the last 24 hours reviewed. Assessment and Plan:      Principal Problem:    Aspiration pneumonia due to food (regurgitated) ([address] (2022)    Active Problems:    Threatened  labor, third trimester (2022)      Abdominal pain during pregnancy in third trimester (2022)      Shortness of breath during pregnancy (2022)      Aspiration pneumonia ([address] (2022)      Sepsis due to anaerobic streptococci ([address] (2022)      Acute respiratory failure with hypoxia ([facility] (2022)      Hypoxia (2022)          aspiration pneumonia.  will have pt ambulate and check oxygen   Send home on augmentin / albuterol inhaler. Proteinuria  -will send home with twice weekly testing.

## 2022-05-04 NOTE — PROGRESS NOTES
Encourage pt to  Sit up in the chair with feet elevated. Also educated pt on the side effects of taking her iron medication.

## 2022-05-04 NOTE — PROGRESS NOTES
Pt ambulated the halls verbalized that she is feeling fine. Oxygen level checked after ambulating , 100 % RA. Pt states she is ready to go home. Dr Byron June made aware.

## 2022-05-05 PROBLEM — O47.03 THREATENED PRETERM LABOR, THIRD TRIMESTER: Status: RESOLVED | Noted: 2022-04-29 | Resolved: 2022-05-05

## 2022-05-05 PROBLEM — O26.893 ABDOMINAL PAIN DURING PREGNANCY IN THIRD TRIMESTER: Status: RESOLVED | Noted: 2022-05-01 | Resolved: 2022-05-05

## 2022-05-05 PROBLEM — R10.9 ABDOMINAL PAIN DURING PREGNANCY IN THIRD TRIMESTER: Status: RESOLVED | Noted: 2022-05-01 | Resolved: 2022-05-05

## 2022-05-05 LAB
BACTERIA SPEC CULT: NORMAL
GRAM STN SPEC: NORMAL
SERVICE CMNT-IMP: NORMAL

## 2022-05-07 LAB
BACTERIA SPEC CULT: NORMAL
BACTERIA SPEC CULT: NORMAL
SERVICE CMNT-IMP: NORMAL
SERVICE CMNT-IMP: NORMAL

## 2022-05-08 PROBLEM — O99.213 OBESITY AFFECTING PREGNANCY IN THIRD TRIMESTER, ANTEPARTUM: Status: ACTIVE | Noted: 2022-05-08

## 2022-05-08 PROBLEM — F32.A ANXIETY AND DEPRESSION: Status: RESOLVED | Noted: 2021-11-03 | Resolved: 2022-05-08

## 2022-05-08 PROBLEM — R09.02 HYPOXIA: Status: RESOLVED | Noted: 2022-05-02 | Resolved: 2022-05-08

## 2022-05-08 PROBLEM — O09.93 HIGH-RISK PREGNANCY IN THIRD TRIMESTER: Status: ACTIVE | Noted: 2021-11-03

## 2022-05-08 PROBLEM — E03.8 SUBCLINICAL HYPOTHYROIDISM: Status: RESOLVED | Noted: 2021-11-04 | Resolved: 2022-05-08

## 2022-05-08 PROBLEM — F41.9 ANXIETY AND DEPRESSION: Status: RESOLVED | Noted: 2021-11-03 | Resolved: 2022-05-08

## 2022-05-08 PROBLEM — Z80.8 FAMILY HISTORY OF THYROID CANCER: Status: RESOLVED | Noted: 2019-12-13 | Resolved: 2022-05-08

## 2022-05-09 PROBLEM — J69.0 ASPIRATION PNEUMONIA (HCC): Status: RESOLVED | Noted: 2022-05-02 | Resolved: 2022-05-09

## 2022-05-09 PROBLEM — R06.02 SHORTNESS OF BREATH DURING PREGNANCY: Status: RESOLVED | Noted: 2022-05-01 | Resolved: 2022-05-09

## 2022-05-09 PROBLEM — O99.891 SHORTNESS OF BREATH DURING PREGNANCY: Status: RESOLVED | Noted: 2022-05-01 | Resolved: 2022-05-09

## 2022-05-09 PROBLEM — O99.513 PNEUMONIA AFFECTING PREGNANCY IN THIRD TRIMESTER: Status: ACTIVE | Noted: 2022-05-02

## 2022-05-09 PROBLEM — F41.9 ANXIETY DURING PREGNANCY IN THIRD TRIMESTER, ANTEPARTUM: Status: ACTIVE | Noted: 2022-05-09

## 2022-05-09 PROBLEM — J96.01 ACUTE RESPIRATORY FAILURE WITH HYPOXIA (HCC): Status: RESOLVED | Noted: 2022-05-02 | Resolved: 2022-05-09

## 2022-05-09 PROBLEM — O99.013 ANEMIA AFFECTING PREGNANCY IN THIRD TRIMESTER: Status: ACTIVE | Noted: 2022-05-09

## 2022-05-09 PROBLEM — J18.9 PNEUMONIA AFFECTING PREGNANCY IN THIRD TRIMESTER: Status: ACTIVE | Noted: 2022-05-02

## 2022-05-09 PROBLEM — A40.8: Status: RESOLVED | Noted: 2022-05-02 | Resolved: 2022-05-09

## 2022-05-09 PROBLEM — O99.343 ANXIETY DURING PREGNANCY IN THIRD TRIMESTER, ANTEPARTUM: Status: ACTIVE | Noted: 2022-05-09

## 2022-05-09 PROBLEM — O14.93 PREECLAMPSIA, THIRD TRIMESTER: Status: ACTIVE | Noted: 2022-05-09

## 2022-05-10 ENCOUNTER — ANESTHESIA EVENT (OUTPATIENT)
Dept: LABOR AND DELIVERY | Age: 22
End: 2022-05-10
Payer: OTHER GOVERNMENT

## 2022-05-10 ENCOUNTER — ANESTHESIA (OUTPATIENT)
Dept: LABOR AND DELIVERY | Age: 22
End: 2022-05-10
Payer: OTHER GOVERNMENT

## 2022-05-10 ENCOUNTER — HOSPITAL ENCOUNTER (INPATIENT)
Age: 22
LOS: 3 days | Discharge: HOME | End: 2022-05-13
Attending: OBSTETRICS & GYNECOLOGY | Admitting: OBSTETRICS & GYNECOLOGY
Payer: OTHER GOVERNMENT

## 2022-05-10 DIAGNOSIS — O09.93 HIGH-RISK PREGNANCY IN THIRD TRIMESTER: ICD-10-CM

## 2022-05-10 DIAGNOSIS — F41.9 ANXIETY DURING PREGNANCY IN THIRD TRIMESTER, ANTEPARTUM: ICD-10-CM

## 2022-05-10 DIAGNOSIS — O42.919 PRETERM PREMATURE RUPTURE OF MEMBRANES (PPROM) WITH UNKNOWN ONSET OF LABOR: Primary | ICD-10-CM

## 2022-05-10 DIAGNOSIS — O99.343 ANXIETY DURING PREGNANCY IN THIRD TRIMESTER, ANTEPARTUM: ICD-10-CM

## 2022-05-10 LAB
A1 MICROGLOB PLACENTAL VAG QL: POSITIVE
ABO + RH BLD: NORMAL
ALBUMIN SERPL-MCNC: 2.3 G/DL (ref 3.5–5)
ALBUMIN/GLOB SERPL: 0.5 {RATIO} (ref 1.2–3.5)
ALP SERPL-CCNC: 95 U/L (ref 50–136)
ALT SERPL-CCNC: 18 U/L (ref 12–65)
AST SERPL-CCNC: 13 U/L (ref 15–37)
BASOPHILS # BLD: 0.1 K/UL (ref 0–0.2)
BASOPHILS NFR BLD MANUAL: 1 % (ref 0–2)
BILIRUB DIRECT SERPL-MCNC: 0.1 MG/DL
BILIRUB SERPL-MCNC: 0.2 MG/DL (ref 0.2–1.1)
BLOOD GROUP ANTIBODIES SERPL: NORMAL
BUN SERPL-MCNC: 7 MG/DL (ref 6–23)
CONTROL LINE PRESENT?: NORMAL
CREAT SERPL-MCNC: 0.55 MG/DL (ref 0.6–1)
DIFFERENTIAL METHOD BLD: ABNORMAL
EOSINOPHIL # BLD: 0.3 K/UL (ref 0–0.8)
EOSINOPHIL NFR BLD MANUAL: 2 % (ref 1–8)
ERYTHROCYTE [DISTWIDTH] IN BLOOD BY AUTOMATED COUNT: 14.7 % (ref 11.9–14.6)
EXPIRATION DATE: NORMAL
GLOBULIN SER CALC-MCNC: 4.7 G/DL (ref 2.3–3.5)
HCT VFR BLD AUTO: 31.9 % (ref 35.8–46.3)
HGB BLD-MCNC: 10.5 G/DL (ref 11.7–15.4)
INTERNAL NEGATIVE CONTROL: NORMAL
KIT LOT NO.: NORMAL
LYMPHOCYTES # BLD: 1.5 K/UL (ref 0.5–4.6)
LYMPHOCYTES NFR BLD MANUAL: 11 % (ref 16–44)
MCH RBC QN AUTO: 27.1 PG (ref 26.1–32.9)
MCHC RBC AUTO-ENTMCNC: 32.9 G/DL (ref 31.4–35)
MCV RBC AUTO: 82.2 FL (ref 79.6–97.8)
METAMYELOCYTES NFR BLD MANUAL: 1 %
MONOCYTES # BLD: 1.1 K/UL (ref 0.1–1.3)
MONOCYTES NFR BLD MANUAL: 8 % (ref 3–9)
MYELOCYTES NFR BLD MANUAL: 2 %
NEUTS BAND NFR BLD MANUAL: 18 % (ref 0–6)
NEUTS SEG # BLD: 10.3 K/UL (ref 1.7–8.2)
NEUTS SEG NFR BLD MANUAL: 57 % (ref 47–75)
NRBC # BLD: 0 K/UL (ref 0–0.2)
PLATELET # BLD AUTO: 233 K/UL (ref 150–450)
PLATELET COMMENTS,PCOM: ADEQUATE
PMV BLD AUTO: 10.8 FL (ref 9.4–12.3)
PROT SERPL-MCNC: 7 G/DL (ref 6.3–8.2)
RBC # BLD AUTO: 3.88 M/UL (ref 4.05–5.2)
RBC MORPH BLD: ABNORMAL
RBC MORPH BLD: ABNORMAL
SPECIMEN EXP DATE BLD: NORMAL
WBC # BLD AUTO: 13.6 K/UL (ref 4.3–11.1)
WBC MORPH BLD: SLIGHT

## 2022-05-10 PROCEDURE — 80076 HEPATIC FUNCTION PANEL: CPT

## 2022-05-10 PROCEDURE — 87086 URINE CULTURE/COLONY COUNT: CPT

## 2022-05-10 PROCEDURE — 3E0DXGC INTRODUCTION OF OTHER THERAPEUTIC SUBSTANCE INTO MOUTH AND PHARYNX, EXTERNAL APPROACH: ICD-10-PCS | Performed by: OBSTETRICS & GYNECOLOGY

## 2022-05-10 PROCEDURE — 74011250636 HC RX REV CODE- 250/636

## 2022-05-10 PROCEDURE — 74011000258 HC RX REV CODE- 258: Performed by: OBSTETRICS & GYNECOLOGY

## 2022-05-10 PROCEDURE — 65270000029 HC RM PRIVATE

## 2022-05-10 PROCEDURE — 86900 BLOOD TYPING SEROLOGIC ABO: CPT

## 2022-05-10 PROCEDURE — A4300 CATH IMPL VASC ACCESS PORTAL: HCPCS | Performed by: ANESTHESIOLOGY

## 2022-05-10 PROCEDURE — 74011250636 HC RX REV CODE- 250/636: Performed by: NURSE ANESTHETIST, CERTIFIED REGISTERED

## 2022-05-10 PROCEDURE — 85025 COMPLETE CBC W/AUTO DIFF WBC: CPT

## 2022-05-10 PROCEDURE — 84112 EVAL AMNIOTIC FLUID PROTEIN: CPT | Performed by: OBSTETRICS & GYNECOLOGY

## 2022-05-10 PROCEDURE — 77030014125 HC TY EPDRL BBMI -B: Performed by: ANESTHESIOLOGY

## 2022-05-10 PROCEDURE — 82565 ASSAY OF CREATININE: CPT

## 2022-05-10 PROCEDURE — 74011250637 HC RX REV CODE- 250/637: Performed by: OBSTETRICS & GYNECOLOGY

## 2022-05-10 PROCEDURE — 74011250636 HC RX REV CODE- 250/636: Performed by: OBSTETRICS & GYNECOLOGY

## 2022-05-10 PROCEDURE — 36415 COLL VENOUS BLD VENIPUNCTURE: CPT

## 2022-05-10 PROCEDURE — 74011000250 HC RX REV CODE- 250: Performed by: NURSE ANESTHETIST, CERTIFIED REGISTERED

## 2022-05-10 PROCEDURE — 84520 ASSAY OF UREA NITROGEN: CPT

## 2022-05-10 RX ORDER — OXYTOCIN/RINGER'S LACTATE 30/500 ML
0-20 PLASTIC BAG, INJECTION (ML) INTRAVENOUS
Status: DISCONTINUED | OUTPATIENT
Start: 2022-05-11 | End: 2022-05-11

## 2022-05-10 RX ORDER — SODIUM CHLORIDE, SODIUM LACTATE, POTASSIUM CHLORIDE, CALCIUM CHLORIDE 600; 310; 30; 20 MG/100ML; MG/100ML; MG/100ML; MG/100ML
125 INJECTION, SOLUTION INTRAVENOUS CONTINUOUS
Status: DISCONTINUED | OUTPATIENT
Start: 2022-05-10 | End: 2022-05-11

## 2022-05-10 RX ORDER — LIDOCAINE HYDROCHLORIDE 20 MG/ML
JELLY TOPICAL
Status: DISCONTINUED | OUTPATIENT
Start: 2022-05-10 | End: 2022-05-11

## 2022-05-10 RX ORDER — SODIUM CHLORIDE 0.9 % (FLUSH) 0.9 %
5-40 SYRINGE (ML) INJECTION AS NEEDED
Status: DISCONTINUED | OUTPATIENT
Start: 2022-05-10 | End: 2022-05-10

## 2022-05-10 RX ORDER — MINERAL OIL
120 OIL (ML) ORAL
Status: COMPLETED | OUTPATIENT
Start: 2022-05-10 | End: 2022-05-11

## 2022-05-10 RX ORDER — SODIUM CHLORIDE 0.9 % (FLUSH) 0.9 %
5-40 SYRINGE (ML) INJECTION EVERY 8 HOURS
Status: DISCONTINUED | OUTPATIENT
Start: 2022-05-10 | End: 2022-05-11

## 2022-05-10 RX ORDER — ROPIVACAINE HYDROCHLORIDE 2 MG/ML
INJECTION, SOLUTION EPIDURAL; INFILTRATION; PERINEURAL AS NEEDED
Status: DISCONTINUED | OUTPATIENT
Start: 2022-05-10 | End: 2022-05-11 | Stop reason: HOSPADM

## 2022-05-10 RX ORDER — OXYTOCIN/RINGER'S LACTATE 30/500 ML
10 PLASTIC BAG, INJECTION (ML) INTRAVENOUS AS NEEDED
Status: DISCONTINUED | OUTPATIENT
Start: 2022-05-10 | End: 2022-05-11

## 2022-05-10 RX ORDER — ONDANSETRON 2 MG/ML
4 INJECTION INTRAMUSCULAR; INTRAVENOUS ONCE
Status: COMPLETED | OUTPATIENT
Start: 2022-05-11 | End: 2022-05-11

## 2022-05-10 RX ORDER — DEXTROSE, SODIUM CHLORIDE, SODIUM LACTATE, POTASSIUM CHLORIDE, AND CALCIUM CHLORIDE 5; .6; .31; .03; .02 G/100ML; G/100ML; G/100ML; G/100ML; G/100ML
125 INJECTION, SOLUTION INTRAVENOUS CONTINUOUS
Status: DISCONTINUED | OUTPATIENT
Start: 2022-05-10 | End: 2022-05-11

## 2022-05-10 RX ORDER — SODIUM CHLORIDE 0.9 % (FLUSH) 0.9 %
5-40 SYRINGE (ML) INJECTION EVERY 8 HOURS
Status: DISCONTINUED | OUTPATIENT
Start: 2022-05-10 | End: 2022-05-10

## 2022-05-10 RX ORDER — LIDOCAINE HYDROCHLORIDE 10 MG/ML
1 INJECTION INFILTRATION; PERINEURAL
Status: DISCONTINUED | OUTPATIENT
Start: 2022-05-10 | End: 2022-05-11

## 2022-05-10 RX ORDER — BUTORPHANOL TARTRATE 2 MG/ML
1 INJECTION INTRAMUSCULAR; INTRAVENOUS
Status: DISCONTINUED | OUTPATIENT
Start: 2022-05-10 | End: 2022-05-11

## 2022-05-10 RX ORDER — ROPIVACAINE HYDROCHLORIDE 2 MG/ML
INJECTION, SOLUTION EPIDURAL; INFILTRATION; PERINEURAL
Status: DISCONTINUED | OUTPATIENT
Start: 2022-05-10 | End: 2022-05-11 | Stop reason: HOSPADM

## 2022-05-10 RX ORDER — LIDOCAINE HYDROCHLORIDE AND EPINEPHRINE 15; 5 MG/ML; UG/ML
INJECTION, SOLUTION EPIDURAL AS NEEDED
Status: DISCONTINUED | OUTPATIENT
Start: 2022-05-10 | End: 2022-05-11 | Stop reason: HOSPADM

## 2022-05-10 RX ORDER — NIFEDIPINE 10 MG/1
20 CAPSULE ORAL
Status: DISCONTINUED | OUTPATIENT
Start: 2022-05-10 | End: 2022-05-11

## 2022-05-10 RX ORDER — OXYTOCIN/RINGER'S LACTATE 30/500 ML
87.3 PLASTIC BAG, INJECTION (ML) INTRAVENOUS AS NEEDED
Status: COMPLETED | OUTPATIENT
Start: 2022-05-10 | End: 2022-05-11

## 2022-05-10 RX ORDER — ONDANSETRON 2 MG/ML
INJECTION INTRAMUSCULAR; INTRAVENOUS
Status: COMPLETED
Start: 2022-05-10 | End: 2022-05-11

## 2022-05-10 RX ORDER — NIFEDIPINE 10 MG/1
10 CAPSULE ORAL
Status: DISCONTINUED | OUTPATIENT
Start: 2022-05-10 | End: 2022-05-11

## 2022-05-10 RX ORDER — LABETALOL HYDROCHLORIDE 5 MG/ML
20 INJECTION, SOLUTION INTRAVENOUS
Status: DISCONTINUED | OUTPATIENT
Start: 2022-05-10 | End: 2022-05-11

## 2022-05-10 RX ADMIN — BUTORPHANOL TARTRATE 1 MG: 2 INJECTION, SOLUTION INTRAMUSCULAR; INTRAVENOUS at 19:39

## 2022-05-10 RX ADMIN — ROPIVACAINE HYDROCHLORIDE 8 ML/HR: 2 INJECTION, SOLUTION EPIDURAL; INFILTRATION at 20:51

## 2022-05-10 RX ADMIN — SODIUM CHLORIDE, SODIUM LACTATE, POTASSIUM CHLORIDE, CALCIUM CHLORIDE AND DEXTROSE MONOHYDRATE 125 ML/HR: 5; 600; 310; 30; 20 INJECTION, SOLUTION INTRAVENOUS at 21:45

## 2022-05-10 RX ADMIN — Medication 1 MILLI-UNITS/MIN: at 23:30

## 2022-05-10 RX ADMIN — ROPIVACAINE HYDROCHLORIDE 8 ML: 2 INJECTION, SOLUTION EPIDURAL; INFILTRATION; PERINEURAL at 20:51

## 2022-05-10 RX ADMIN — LIDOCAINE HYDROCHLORIDE,EPINEPHRINE BITARTRATE 3 ML: 15; .005 INJECTION, SOLUTION EPIDURAL; INFILTRATION; INTRACAUDAL; PERINEURAL at 20:47

## 2022-05-10 RX ADMIN — SODIUM CHLORIDE, SODIUM LACTATE, POTASSIUM CHLORIDE, CALCIUM CHLORIDE AND DEXTROSE MONOHYDRATE 125 ML/HR: 5; 600; 310; 30; 20 INJECTION, SOLUTION INTRAVENOUS at 17:02

## 2022-05-10 RX ADMIN — BUTORPHANOL TARTRATE 1 MG: 2 INJECTION, SOLUTION INTRAMUSCULAR; INTRAVENOUS at 16:54

## 2022-05-10 RX ADMIN — Medication 50 MCG: at 16:26

## 2022-05-10 RX ADMIN — AMPICILLIN SODIUM 2 G: 2 INJECTION, POWDER, FOR SOLUTION INTRAMUSCULAR; INTRAVENOUS at 17:35

## 2022-05-10 RX ADMIN — SODIUM CHLORIDE, SODIUM LACTATE, POTASSIUM CHLORIDE, AND CALCIUM CHLORIDE 125 ML/HR: 600; 310; 30; 20 INJECTION, SOLUTION INTRAVENOUS at 14:30

## 2022-05-10 RX ADMIN — AMPICILLIN SODIUM 1 G: 1 INJECTION, POWDER, FOR SOLUTION INTRAMUSCULAR; INTRAVENOUS at 21:38

## 2022-05-10 NOTE — PROGRESS NOTES
Dr Krishan Fierro called and orders received to start patient on Ampicillin 2gm IVPB and then Ampicillin 1gm IVPB every 4 hours for PPROM. Orders verified and read back.

## 2022-05-10 NOTE — PROGRESS NOTES
Pt to room 434 for IOL. Bedside report received from Miller Bejarano RN. Pt care assumed. POC discussed. Consents signed. EFM and TOCO applied. IV started x2 attempts in left hand with 18G, labs drawn and sent. Fluids running without difficulty. Urine collection sent for UA to lab.

## 2022-05-10 NOTE — PROGRESS NOTES
Pt presented to [facility] complaining of leaking clear fluid and painful contractions since 0430 this AM. EFM on. Dr Gale Schilder notified. [address] [facility] [address] [place]. POSITIVE. Dr Gale Schilder at bedside. SVE 1cm. US done per MD paz. 1315- Pt transferred to [address] for labor. Dr Selina Yung notified. OSEI consult ordered. 1335- Urine sent. IV started and labs sent.

## 2022-05-10 NOTE — H&P
Jade History & Physical    Name: Vito Rose MRN: [id number]  SSN: [id number]    YOB: 2000  Age: 23 y.o. Sex: female      Subjective: 24 yo  at 34w1d presents with complaint of pelvic pain/contractions followed by leaking fluid this am at 0430. She continued to leak small amounts then had a large gush of fluid that soaked her underwear. She came to [facility] for evaluation. [address] [place] [address] complicated by recent hospitalization 22 with aspiration pneumonia, patient was just discharged from the hospital on 22. PNC also complicated by elevated BP with recent dx preeclampsia, threatened PTL, anemia, anxiety and depression, hx of thyroiditis, recent 24 hour urine collection = 312 mg total protein on 22. Pt has been taking PO antibiotics. She had betamethasone course  and 22. Reason for Admission:  Pregnancy and leaking fluid. History of Present Illness: Ms. Lluvia Montes is a 23 y.o.  female with an estimated gestational age of 34w3d with Estimated Date of Delivery: 22. Patient complains of leaking fluid and cramping since 0430 am.   Patient denies chest pain, fever, headache , nausea and vomiting, right upper quadrant pain  , shortness of breath, swelling, vaginal bleeding , visual disturbances and back pain and dysuria. She has noted some urinary urgency and hesitancy. OB History    Para Term  AB Living   2 0 0 0 1 0   SAB IAB Ectopic Molar Multiple Live Births   1 0 0 0 0 0      # Outcome Date GA Lbr Henry/2nd Weight Sex Delivery Anes PTL Lv   2 Current            1 SAB              Past Medical History:   Diagnosis Date    Acute respiratory failure with hypoxia ([address] 2022    Anemia affecting pregnancy in third trimester 2022    Anxiety and depression 11/3/2021    No current meds.     Aspiration pneumonia ([address] 2022    Family history of thyroid cancer 2019    Goiter     Hashimoto's thyroiditis     Hypoxia 2022    Irritable bowel syndrome with constipation     Irritable bowel syndrome with constipation     Major depressive disorder, single episode with anxious distress 10/11/2018    Preeclampsia, third trimester 5/9/2022    Sepsis due to anaerobic streptococci ([address] 5/2/2022    Shortness of breath during pregnancy 5/1/2022    Subclinical hypothyroidism 11/4/2021    Vitamin D deficiency     Vitamin D deficiency      Past Surgical History:   Procedure Laterality Date    HX COLONOSCOPY  2020    HX GYN  03/17/2020    IUD placement    HX ORTHOPAEDIC  age 1    Achilles tendon lengthening    HX TONSILLECTOMY       Social History     Occupational History    Not on file   Tobacco Use    Smoking status: Never Smoker    Smokeless tobacco: Never Used   Substance and Sexual Activity    Alcohol use: Never    Drug use: Not Currently    Sexual activity: Yes     Partners: Male     Birth control/protection: None      Family History   Problem Relation Age of Onset    COPD Mother     Thyroid Disease Mother         hypothyroidism    Asthma Mother     Thyroid Disease Father         hyperthyroidism    Asthma Brother     Ovarian Cancer Maternal Grandmother     Heart Disease Maternal Grandmother     Dementia Paternal Grandmother     Thyroid Cancer Maternal Cousin        No Known Allergies  Prior to Admission medications    Medication Sig Start Date End Date Taking? Authorizing Provider   amoxicillin-clavulanate (AUGMENTIN) 875-125 mg per tablet Take 1 Tablet by mouth two (2) times daily (with meals) for 12 doses. Indications: pneumonia caused by bacteria 5/4/22 5/10/22 Yes Rubina Chavez,    venlafaxine-SR (EFFEXOR-XR) 150 mg capsule Take 1 Capsule by mouth daily. Indications: anxiousness associated with depression  Patient not taking: Reported on 5/10/2022 5/9/22   Maurice Smyth MD   albuterol sulfate 90 mcg/actuation aebs Take 2 Puffs by inhalation four (4) times daily as needed for Wheezing.   Patient not taking: Reported on 2022   Kathy Oh Peralta, DO   levothyroxine (SYNTHROID) 25 mcg tablet Take one and a half tablet by oral route once daily  Patient not taking: Reported on 5/10/2022 4/4/22   Deven Kennedy, DO   prenatal vit 91/iron/folic/dha (PRENATAL + DHA PO) Take  by mouth. Provider, Historical        Review of Systems:  A comprehensive review of systems was negative except for that written in the History of Present Illness. Objective:     Vitals:    Vitals:    05/10/22 1246 05/10/22 1247   BP:  (!) 155/88   Pulse:  93   Resp:  20   Temp:  98.4 °F (36.9 °C)   Weight: 84.8 kg (187 lb)    Height: 5' (1.524 m)       Temp (24hrs), Av.4 °F (36.9 °C), Min:98.4 °F (36.9 °C), Max:98.4 °F (36.9 °C)    BP  Min: 135/88  Max: 155/88     Physical Exam:  Constitutional: The patient appears well, alert, oriented x 3. Cardiovascular: Heart RRR, no murmurs. Respiratory: Lungs clear, no respiratory distress  GI: Abdomen soft, nontender, no guarding  No fundal tenderness  Musculoskeletal: no cva tenderness  Upper ext: no edema, reflexes +2  Lower ext: no edema, neg joellen's, reflexes +2  Skin: no rashes or lesions  Psychiatric:Mood/ Affect: appropriate  Genitourinary: SVE: /50/-3  Membranes:  Premature Rupture of Membranes; Amniotic Fluid: clear fluid  Fetal Heart Rate:  Reactive  Baseline: 155 beats per minute  Variability: moderate  Accelerations: yes  Decelerations: none  Uterine contractions: irregular, every 4-10 minutes     SSE: cervix with mucous dc, pooling present clear fluid  Bedside ultrasound cephalic presentation    Lab/Data Review:  No results found for this or any previous visit (from the past 24 hour(s)). Amnisure positive    Assessment and Plan: Active Problems:    Preeclampsia, third trimester (2022)      Overview: 22 PreE labs WNL, 24 hr urine 312      2022 at [facility]: BP Mild range elevations today 135/88. Reports daily HA       and BUE & BLE edema.       · Repeat preeclamptic labs in your office for elevated BP or symptoms.       (CBC, CMP, LDH, Uric Acid); also do P/C ratio (if elevated, need to then       confirm with 24h urine) or 24 hour urine (for total protein and creatinine       clearance).  premature rupture of membranes (PPROM) with unknown onset of labor (5/10/2022)       Admit to L&D for induction of labor. Cephalic by bedside us. GBS negative. S/p BMZ course for FLM on  and 22. S/p MFM consult as outpatient. BP mild range, OB emergency hypertensive protocol ordered. No severe features at this time. Continue to monitor closely. Notified Dr. Lotus Holguin of patient's assessment and plan of care. She is in agreement with admission, IOL for PPROM and will assume care at this time. Cytotec planned for cervical ripening. FWB overall reassuring, category 1 tracing. Patient with depression, recently to start SSRI. Continue to monitor closely.

## 2022-05-10 NOTE — CONSULTS
Neonatology Prenatal Consult:    Prenatal Consult was requested by the obstetrician. Obstetrical Note:  Medical record and notes reviewed. Obstetrical Findings:      Mother's Date of Admission: 5/10/2022 12:28 PM   Age: 23 y.o.  DONAL: Estimated Date of Delivery: 22  Gestation by dates: 34w1d   Pregnancy:   Membrane status:        Social History     Socioeconomic History    Marital status:    Tobacco Use    Smoking status: Never Smoker    Smokeless tobacco: Never Used   Substance and Sexual Activity    Alcohol use: Never    Drug use: Not Currently    Sexual activity: Yes     Partners: Male     Birth control/protection: None     Current Facility-Administered Medications   Medication Dose Route Frequency    sodium chloride (NS) flush 5-40 mL  5-40 mL IntraVENous Q8H    sodium chloride (NS) flush 5-40 mL  5-40 mL IntraVENous PRN    lactated Ringers infusion  125 mL/hr IntraVENous CONTINUOUS    sodium chloride (NS) flush 5-40 mL  5-40 mL IntraVENous Q8H    sodium chloride (NS) flush 5-40 mL  5-40 mL IntraVENous PRN    NIFEdipine (PROCARDIA) capsule 10 mg  10 mg Oral ONCE PRN    NIFEdipine (PROCARDIA) capsule 20 mg  20 mg Oral Q20MIN PRN    labetaloL (NORMODYNE;TRANDATE) injection 20 mg  20 mg IntraVENous ONCE PRN     Patient Active Problem List    Diagnosis Date Noted    Preeclampsia, third trimester 2022     premature rupture of membranes (PPROM) with unknown onset of labor 05/10/2022    Anemia affecting pregnancy in third trimester 2022    Anxiety during pregnancy in third trimester, antepartum 2022    Obesity affecting pregnancy in third trimester, antepartum 2022    Pneumonia affecting pregnancy in third trimester 2022    High-risk pregnancy in third trimester 2021    Hypothyroid in pregnancy, antepartum, third trimester        Lab Results   Component Value Date/Time    ABO/Rh(D) A POSITIVE 2022 10:26 AM    Antibody screen NEG 2022 10:26 AM    Antibody screen, External negative 2021 12:00 AM    HBsAg, External negative 2021 12:00 AM    HIV, External NR 2021 12:00 AM    Rubella, External immune 2021 12:00 AM    RPR, External NR 2021 12:00 AM    Gonorrhea, External negative 2021 12:00 AM    Chlamydia, External negative 2021 12:00 AM    ABO,Rh A positive 2021 12:00 AM        Items below were discussed with the parents:      Neonatology coverage reviewed. Survival is very good to excellent. This improves with advancing gestational age. Expected LOS, dependent on gestational age and maturity skills reviewed.  resuscitation team attendance reviewed. Admissions procedures were explained. Family visitation policy were explained. RDS, at some risk. This risk decreases with advancing gestational age. Management of RDS was reviewed. Sepsis evaluation was explained. Feeding techniques reviewed. Mother plans to provide breast milk, yes. The benefits of breast milk were discussed in detail, and breast milk feedings is strongly recommended. Lactation services were also reviewed. Gut problems, NEC and infections are lessened with breast milk feedings. Significant IVH and ROP are at a low risk as compared to the extreme  gestation. This risk decreases with advancing gestational age. School readiness and learning problems are at a low but increased risk as compared to the full term gestation. Asthma-like problems later in life is at a low but increased risk as compared to term gestation. An increased risk is found with a family history or with smoking. Significant morbidity or complications are a low occurrence for a \"late \" , but with the potential for an extended hospital stay because of temperature maintenance and poor feeding skills. If male , have discussed potential for circumcision. Risks and benefits explained.   Family advised to think carefully about this procedure before consenting. SIDS and safe infant sleep practices were reviewed. Family advised to maintain their flu and pertussis vaccinations. Local or primary pediatrician: to be determined. Recommendations: The state  regionalization guidelines were reviewed. The timing and place of delivery is determined by the obstetrical staff. The subsequent appropriate level of  care is determined by the  staff. It is appropriate for the infant to be receive care here in our  Care Unit, if after evaluation of the  infant, it is determined that greater than 32 weeks gestation and greater than 1500 gm, or that a higher level of care is not required, or with consultation with the level III  C. If infant needs NCU care, then will plan to admit to the neonatology service. Attestation:     Total consultation time was 40 minutes with over 50% of the total time was spent in counseling or coordination of care. This included prognosis, risks and benefits of management (treatment) options, importance of compliance with chosen management (treatment) options, and patient and family education.         Signed: Jose Bear MD  Today's Date: 5/10/2022

## 2022-05-10 NOTE — PROGRESS NOTES
Recent Results (from the past 12 hour(s))   RUPTURE OF FETAL MEMBRANES, POC    Collection Time: 05/10/22  1:05 PM   Result Value Ref Range    Rupture of fetal membrane Positive Negative    Control line present? Acceptable     Internal negative control Acceptable     Kit Lot No. [id number]     Expiration date 1/31/24    CBC WITH AUTOMATED DIFF    Collection Time: 05/10/22  1:40 PM   Result Value Ref Range    WBC 13.6 (H) 4.3 - 11.1 K/uL    RBC 3.88 (L) 4.05 - 5.2 M/uL    HGB 10.5 (L) 11.7 - 15.4 g/dL    HCT 31.9 (L) 35.8 - 46.3 %    MCV 82.2 79.6 - 97.8 FL    MCH 27.1 26.1 - 32.9 PG    MCHC 32.9 31.4 - 35.0 g/dL    RDW 14.7 (H) 11.9 - 14.6 %    PLATELET 562 486 - 905 K/uL    MPV 10.8 9.4 - 12.3 FL    ABSOLUTE NRBC 0.00 0.0 - 0.2 K/uL    NEUTROPHILS 57 47 - 75 %    BAND NEUTROPHILS 18 (H) 0 - 6 %    LYMPHOCYTES 11 (L) 16 - 44 %    MONOCYTES 8 3 - 9 %    EOSINOPHILS 2 1 - 8 %    BASOPHILS 1 0 - 2 %    METAMYELOCYTES 1 %    MYELOCYTES 2 %    ABS. NEUTROPHILS 10.3 (H) 1.7 - 8.2 K/UL    ABS. LYMPHOCYTES 1.5 0.5 - 4.6 K/UL    ABS. MONOCYTES 1.1 0.1 - 1.3 K/UL    ABS. EOSINOPHILS 0.3 0.0 - 0.8 K/UL    ABS.  BASOPHILS 0.1 0.0 - 0.2 K/UL    RBC COMMENTS SLIGHT  POLYCHROMASIA        RBC COMMENTS SLIGHT  MACROCYTOSIS        WBC COMMENTS SLIGHT      PLATELET COMMENTS ADEQUATE      DF MANUAL     TYPE & SCREEN    Collection Time: 05/10/22  1:40 PM   Result Value Ref Range    Crossmatch Expiration 05/13/2022,2526     ABO/Rh(D) A POSITIVE     Antibody screen NEG    BUN    Collection Time: 05/10/22  2:48 PM   Result Value Ref Range    BUN 7 6 - 23 MG/DL   CREATININE    Collection Time: 05/10/22  2:48 PM   Result Value Ref Range    Creatinine 0.55 (L) 0.6 - 1.0 MG/DL   HEPATIC FUNCTION PANEL    Collection Time: 05/10/22  2:48 PM   Result Value Ref Range    Protein, total 7.0 6.3 - 8.2 g/dL    Albumin 2.3 (L) 3.5 - 5.0 g/dL    Globulin 4.7 (H) 2.3 - 3.5 g/dL    A-G Ratio 0.5 (L) 1.2 - 3.5      Bilirubin, total 0.2 0.2 - 1.1 MG/DL Bilirubin, direct 0.1 <0.4 MG/DL    Alk.  phosphatase 95 50 - 136 U/L    AST (SGOT) 13 (L) 15 - 37 U/L    ALT (SGPT) 18 12 - 65 U/L   BP stable  AOK  Continue induction

## 2022-05-10 NOTE — PROGRESS NOTES
Dr Krishan Fierro called for pt orders. Orders received for pt to eat and then start cytotec 50mcg. Verified and read back by this RN.

## 2022-05-11 LAB
ALBUMIN SERPL-MCNC: 1.9 G/DL (ref 3.5–5)
ALBUMIN/GLOB SERPL: 0.5 {RATIO} (ref 1.2–3.5)
ALP SERPL-CCNC: 88 U/L (ref 50–130)
ALT SERPL-CCNC: 15 U/L (ref 12–65)
ANION GAP SERPL CALC-SCNC: 8 MMOL/L (ref 7–16)
APTT PPP: 28.7 SEC (ref 24.1–35.1)
AST SERPL-CCNC: 21 U/L (ref 15–37)
BASE DEFICIT BLD-SCNC: 6.3 MMOL/L
BASE DEFICIT BLD-SCNC: 6.5 MMOL/L
BILIRUB SERPL-MCNC: 0.5 MG/DL (ref 0.2–1.1)
BUN SERPL-MCNC: 4 MG/DL (ref 6–23)
CALCIUM SERPL-MCNC: 8.7 MG/DL (ref 8.3–10.4)
CHLORIDE SERPL-SCNC: 107 MMOL/L (ref 98–107)
CO2 SERPL-SCNC: 25 MMOL/L (ref 21–32)
CREAT SERPL-MCNC: 0.72 MG/DL (ref 0.6–1)
CREAT UR-MCNC: <13 MG/DL
ERYTHROCYTE [DISTWIDTH] IN BLOOD BY AUTOMATED COUNT: 14.6 % (ref 11.9–14.6)
GLOBULIN SER CALC-MCNC: 4.2 G/DL (ref 2.3–3.5)
GLUCOSE SERPL-MCNC: 110 MG/DL (ref 65–100)
HCO3 BLD-SCNC: 23.4 MMOL/L (ref 22–26)
HCO3 BLDV-SCNC: 19.4 MMOL/L (ref 23–28)
HCT VFR BLD AUTO: 30.3 % (ref 35.8–46.3)
HGB BLD-MCNC: 9.9 G/DL (ref 11.7–15.4)
INR PPP: 1
MCH RBC QN AUTO: 26.8 PG (ref 26.1–32.9)
MCHC RBC AUTO-ENTMCNC: 32.7 G/DL (ref 31.4–35)
MCV RBC AUTO: 82.1 FL (ref 79.6–97.8)
NRBC # BLD: 0 K/UL (ref 0–0.2)
PCO2 BLDCO: 39 MMHG (ref 32–68)
PCO2 BLDCO: 63 MMHG (ref 32–68)
PH BLDCO: 7.18 [PH] (ref 7.15–7.38)
PH BLDCO: 7.31 [PH] (ref 7.15–7.38)
PLATELET # BLD AUTO: 225 K/UL (ref 150–450)
PMV BLD AUTO: 10.8 FL (ref 9.4–12.3)
PO2 BLDCO: 24 MMHG
PO2 BLDCO: 37 MMHG
POTASSIUM SERPL-SCNC: 3.3 MMOL/L (ref 3.5–5.1)
PROT SERPL-MCNC: 6.1 G/DL (ref 6.3–8.2)
PROT UR-MCNC: 39 MG/DL
PROT/CREAT UR-RTO: NORMAL
PROTHROMBIN TIME: 13.2 SEC (ref 12.6–14.5)
RBC # BLD AUTO: 3.69 M/UL (ref 4.05–5.2)
SAO2 % BLD: 28.3 % (ref 95–98)
SAO2 % BLDV: 64.6 % (ref 65–88)
SERVICE CMNT-IMP: ABNORMAL
SERVICE CMNT-IMP: ABNORMAL
SODIUM SERPL-SCNC: 140 MMOL/L (ref 136–145)
SPECIMEN TYPE: ABNORMAL
SPECIMEN TYPE: ABNORMAL
WBC # BLD AUTO: 16.4 K/UL (ref 4.3–11.1)

## 2022-05-11 PROCEDURE — 77030019905 HC CATH URETH INTMIT MDII -A

## 2022-05-11 PROCEDURE — 82803 BLOOD GASES ANY COMBINATION: CPT

## 2022-05-11 PROCEDURE — 74011250637 HC RX REV CODE- 250/637: Performed by: OBSTETRICS & GYNECOLOGY

## 2022-05-11 PROCEDURE — 75410000003 HC RECOV DEL/VAG/CSECN EA 0.5 HR

## 2022-05-11 PROCEDURE — 65270000029 HC RM PRIVATE

## 2022-05-11 PROCEDURE — 74011250636 HC RX REV CODE- 250/636: Performed by: OBSTETRICS & GYNECOLOGY

## 2022-05-11 PROCEDURE — 85610 PROTHROMBIN TIME: CPT

## 2022-05-11 PROCEDURE — 59400 OBSTETRICAL CARE: CPT | Performed by: OBSTETRICS & GYNECOLOGY

## 2022-05-11 PROCEDURE — 75410000002 HC LABOR FEE PER 1 HR

## 2022-05-11 PROCEDURE — 85027 COMPLETE CBC AUTOMATED: CPT

## 2022-05-11 PROCEDURE — 74011000250 HC RX REV CODE- 250: Performed by: OBSTETRICS & GYNECOLOGY

## 2022-05-11 PROCEDURE — 80053 COMPREHEN METABOLIC PANEL: CPT

## 2022-05-11 PROCEDURE — 74011250636 HC RX REV CODE- 250/636

## 2022-05-11 PROCEDURE — 74011000258 HC RX REV CODE- 258: Performed by: OBSTETRICS & GYNECOLOGY

## 2022-05-11 PROCEDURE — 85730 THROMBOPLASTIN TIME PARTIAL: CPT

## 2022-05-11 PROCEDURE — 84156 ASSAY OF PROTEIN URINE: CPT

## 2022-05-11 PROCEDURE — 2709999900 HC NON-CHARGEABLE SUPPLY

## 2022-05-11 PROCEDURE — 76060000078 HC EPIDURAL ANESTHESIA

## 2022-05-11 PROCEDURE — 36415 COLL VENOUS BLD VENIPUNCTURE: CPT

## 2022-05-11 PROCEDURE — 75410000000 HC DELIVERY VAGINAL/SINGLE

## 2022-05-11 RX ORDER — OXYCODONE AND ACETAMINOPHEN 7.5; 325 MG/1; MG/1
2 TABLET ORAL
Status: DISCONTINUED | OUTPATIENT
Start: 2022-05-11 | End: 2022-05-13 | Stop reason: HOSPADM

## 2022-05-11 RX ORDER — NALOXONE HYDROCHLORIDE 0.4 MG/ML
0.4 INJECTION, SOLUTION INTRAMUSCULAR; INTRAVENOUS; SUBCUTANEOUS AS NEEDED
Status: DISCONTINUED | OUTPATIENT
Start: 2022-05-11 | End: 2022-05-13 | Stop reason: HOSPADM

## 2022-05-11 RX ORDER — AMOXICILLIN AND CLAVULANATE POTASSIUM 875; 125 MG/1; MG/1
1 TABLET, FILM COATED ORAL 2 TIMES DAILY WITH MEALS
Status: DISCONTINUED | OUTPATIENT
Start: 2022-05-11 | End: 2022-05-11 | Stop reason: ALTCHOICE

## 2022-05-11 RX ORDER — OXYCODONE AND ACETAMINOPHEN 5; 325 MG/1; MG/1
1 TABLET ORAL
Status: DISCONTINUED | OUTPATIENT
Start: 2022-05-11 | End: 2022-05-13 | Stop reason: HOSPADM

## 2022-05-11 RX ORDER — VENLAFAXINE HYDROCHLORIDE 150 MG/1
150 CAPSULE, EXTENDED RELEASE ORAL DAILY
Status: DISCONTINUED | OUTPATIENT
Start: 2022-05-11 | End: 2022-05-13 | Stop reason: HOSPADM

## 2022-05-11 RX ORDER — ZOLPIDEM TARTRATE 5 MG/1
5 TABLET ORAL
Status: DISCONTINUED | OUTPATIENT
Start: 2022-05-11 | End: 2022-05-13 | Stop reason: HOSPADM

## 2022-05-11 RX ORDER — LABETALOL HYDROCHLORIDE 5 MG/ML
20 INJECTION, SOLUTION INTRAVENOUS
Status: COMPLETED | OUTPATIENT
Start: 2022-05-11 | End: 2022-05-11

## 2022-05-11 RX ORDER — LEVOTHYROXINE SODIUM 50 UG/1
50 TABLET ORAL
Status: DISCONTINUED | OUTPATIENT
Start: 2022-05-11 | End: 2022-05-13 | Stop reason: HOSPADM

## 2022-05-11 RX ORDER — DIPHENHYDRAMINE HCL 25 MG
25 CAPSULE ORAL
Status: DISCONTINUED | OUTPATIENT
Start: 2022-05-11 | End: 2022-05-13 | Stop reason: HOSPADM

## 2022-05-11 RX ORDER — SIMETHICONE 80 MG
80 TABLET,CHEWABLE ORAL
Status: DISCONTINUED | OUTPATIENT
Start: 2022-05-11 | End: 2022-05-13 | Stop reason: HOSPADM

## 2022-05-11 RX ORDER — IBUPROFEN 400 MG/1
400 TABLET ORAL
Status: DISCONTINUED | OUTPATIENT
Start: 2022-05-11 | End: 2022-05-13 | Stop reason: HOSPADM

## 2022-05-11 RX ORDER — LABETALOL 200 MG/1
200 TABLET, FILM COATED ORAL 2 TIMES DAILY
Status: DISCONTINUED | OUTPATIENT
Start: 2022-05-11 | End: 2022-05-13 | Stop reason: HOSPADM

## 2022-05-11 RX ADMIN — LABETALOL HYDROCHLORIDE 20 MG: 5 INJECTION INTRAVENOUS at 07:52

## 2022-05-11 RX ADMIN — IBUPROFEN 400 MG: 400 TABLET ORAL at 07:09

## 2022-05-11 RX ADMIN — LEVOTHYROXINE SODIUM 50 MCG: 0.05 TABLET ORAL at 07:52

## 2022-05-11 RX ADMIN — OXYCODONE AND ACETAMINOPHEN 2 TABLET: 7.5; 325 TABLET ORAL at 10:18

## 2022-05-11 RX ADMIN — Medication 87.3 MILLI-UNITS/MIN: at 06:53

## 2022-05-11 RX ADMIN — IBUPROFEN 400 MG: 400 TABLET ORAL at 21:22

## 2022-05-11 RX ADMIN — LABETALOL HYDROCHLORIDE 200 MG: 200 TABLET, FILM COATED ORAL at 15:07

## 2022-05-11 RX ADMIN — MINERAL OIL 120 ML: 1000 LIQUID ORAL at 04:56

## 2022-05-11 RX ADMIN — ONDANSETRON 4 MG: 2 INJECTION INTRAMUSCULAR; INTRAVENOUS at 00:00

## 2022-05-11 RX ADMIN — AMPICILLIN SODIUM 1 G: 1 INJECTION, POWDER, FOR SOLUTION INTRAMUSCULAR; INTRAVENOUS at 01:29

## 2022-05-11 RX ADMIN — VENLAFAXINE HYDROCHLORIDE 150 MG: 150 CAPSULE, EXTENDED RELEASE ORAL at 08:52

## 2022-05-11 RX ADMIN — IBUPROFEN 400 MG: 400 TABLET ORAL at 15:07

## 2022-05-11 NOTE — PROGRESS NOTES
Patient Vitals for the past 4 hrs: Mode Fetal Heart Rate Variability Decelerations Accelerations RN Reviewed Strip?  Non Stress Test   05/11/22 0045      Yes    05/11/22 0030 External 145 6-25 BPM None Yes Yes Reactive   05/11/22 0015      Yes    05/10/22 2345      Yes    05/10/22 2330 External 140 6-25 BPM None Yes Yes    05/10/22 2315      Yes    05/10/22 2300 External 135 6-25 BPM None Yes Yes Reactive   05/10/22 2245      Yes    05/10/22 2230 External 145 6-25 BPM Early  Yes    05/10/22 2215      Yes    05/10/22 2200 External 130 6-25 BPM None No Yes    05/10/22 2145      Yes    05/10/22 2129 External 125 6-25 BPM None Yes Yes Reactive   05/10/22 2115      Yes    05/10/22 2100 External 125 6-25 BPM None Yes Yes Reactive   PER RN pt comfortable and doing well   Pit at 4

## 2022-05-11 NOTE — LACTATION NOTE
Lactation visit. First time mom. Baby born this morning, 5AM, in NCU due to prematurity. 34 week gestation. Mom has been asleep. Ready to start pumping now. Full instruction given on pump parts, pump settings and flange fit. Assisted mom with pumping. Showed mom hands on pumping. Mom has been leaking colostrum for weeks she states. Colostrum easily expressed with pumping, 8ml total. High volume. TReviewed collection and proper labeling for NCU infant. Discussed normal volume expectations. Volume fluctuation expected and reassured mom. Pump every 3 hours. Need to pump 8 times in 24 hours. LC to continue to follow and assist. Skin to skin encouraged as allowed.

## 2022-05-11 NOTE — PROGRESS NOTES
Dr. Olimpia Cancino updated on SVE below. MD coming to bedside. 05/11/22 0440   Cervical Exam   Dilation (cm) 10   Eff 100 %   Station +1   Vaginal exam done byJonnie Mosquera

## 2022-05-11 NOTE — L&D DELIVERY NOTE
Delivery Summary    Patient: Donna Nguyen MRN: [id number]  SSN: [id number]    YOB: 2000  Age: 23 y.o. Sex: female       Information for the patient's :  Autumn [place] Girl Frankie Arroyo [[id number]]       Labor Events:    Labor: Yes    Steroids: Full Course   Cervical Ripening Date/Time: 5/10/2022     Cervical Ripening Type: Misoprostol   Antibiotics During Labor: No   Rupture Identifier: Sac 1    Rupture Date/Time: 5/10/2022 4:30 AM   Rupture Type: PPROM   Amniotic Fluid Volume: Moderate    Amniotic Fluid Description: Clear    Amniotic Fluid Odor: None    Induction:         Induction Date/Time:        Indications for Induction:      Augmentation:     Augmentation Date/Time:      Indications for Augmentation:     Labor complications: None       Additional complications:        Delivery Events:  Indications For Episiotomy:     Episiotomy: None   Perineal Laceration(s):     Repaired:     Periurethral Laceration Location:      Repaired:     Labial Laceration Location:     Repaired:     Sulcal Laceration Location:     Repaired:     Vaginal Laceration Location:     Repaired:     Cervical Laceration Location:     Repaired:     Repair Suture:     Number of Repair Packets:     Estimated Blood Loss (ml):  ml   Quantitative Blood Loss (ml) Quantitative Blood Loss (mL): 25 mL              Delivery Date: 2022    Delivery Time: 5:38 AM  Delivery Type: Vaginal, Spontaneous  Sex:  Female    Gestational Age: 35w2d   Delivery Clinician:  Candance Cluck  Living Status: Living   Delivery Location: L&D            APGARS  One minute Five minutes Ten minutes   Skin color: 0   1   1     Heart rate: 2   2   2     Grimace: 2   2   2     Muscle tone: 1   2   2     Breathin   2   2     Totals: 6   9   9         Presentation: Vertex    Position: Left Occiput Anterior  Resuscitation Method:  Suctioning-bulb; Tactile Stimulation     Meconium Stained:        Cord Information: 3 Vessels  Complications: Nuchal Cord With Compressions  Cord around: head  Delayed cord clamping? Yes  Cord clamped date/time:   Disposition of Cord Blood: Lab    Blood Gases Sent?: Yes    Placenta:  Date/Time: 2022  5:40 AM  Removal: Spontaneous      Appearance: Normal      Measurements:  Birth Weight: 5 lb 11.4 oz (2.59 kg)      Birth Length: 1' 7.29\" (0.49 m)      Head Circumference: 1' 0.6\" (0.32 m)      Chest Circumference: 11.42\" (0.29 m)     Abdominal Girth: Other Providers:   ALTHEA WATT;EDUARDO BARKER;YADIRA ROJAS;MOLINA KEARNEY;Ema TOMAS, Obstetrician;Primary Nurse;Primary [place] Nurse;Scrub Tech;Neonatologist;Respiratory Therapist           Group B Strep: No results found for: GRBSEXT, GRBSEXT  Information for the patient's :  Charlee Macias [place] [[id number]]   No results found for: ABORH, PCTABR, PCTDIG, BILI, ABORHEXT, ABORH     No results for input(s): PCO2CB, PO2CB, HCO3I, SO2I, IBD, PTEMPI, SPECTI, PHICB, ISITE, IDEV, IALLEN in the last 72 hours. CTSP secondary to  with pushing  over intact perineum in JAME position with tight nuchal cord, attempted to reduce unable, clamped and cut at perineum. Baby handed to awaiting NICU staff for stimulation. Spontaneous delivery of placenta. Cervix and vagina inspected without evidence of lacerations.  Exceelent hemostasis and cosmesis

## 2022-05-11 NOTE — ANESTHESIA PROCEDURE NOTES
Epidural Block    Patient location during procedure: OB  Start time: 5/10/2022 8:41 PM  End time: 5/10/2022 8:48 PM  Reason for block: primary anesthetic, at surgeon's request and labor epidural  Staffing  Performed: attending   Anesthesiologist: Candy Iraheta MD  Preanesthetic Checklist  Completed: patient identified, risks and benefits discussed, surgical consent, pre-op evaluation and timeout performed  Block Placement  Patient position: sitting  Prep: ChloraPrep  Sterility prep: cap, drape, gloves, hand and mask  Sedation level: no sedation  Patient monitoring: continuous pulse oximetry and heart rate  Approach: midline  Location: lumbar  Lumbar location: L3-L4  Epidural  Loss of resistance technique: saline  Guidance: landmark technique  Needle  Needle type: Tuohy   Needle gauge: 17 G  Needle length: 10 cm  Needle insertion depth: 5 cm  Catheter type: end hole  Catheter size: 19 G  Catheter at skin depth: 9 cm  Catheter securement method: clear occlusive dressing, liquid medical adhesive and surgical tape  Test dose: negative  Assessment  Block outcome: pain improved  Number of attempts: 1  Procedure assessment: patient tolerated procedure well with no immediate complications

## 2022-05-11 NOTE — ANESTHESIA PREPROCEDURE EVALUATION
Relevant Problems   NEUROLOGY   (+) Anxiety during pregnancy in third trimester, antepartum      CARDIOVASCULAR   (+) Preeclampsia, third trimester      ENDOCRINE   (+) Hypothyroid in pregnancy, antepartum, third trimester   (+) Obesity affecting pregnancy in third trimester, antepartum      HEMATOLOGY   (+) Anemia affecting pregnancy in third trimester       Anesthetic History   No history of anesthetic complications            Review of Systems / Medical History  Patient summary reviewed and pertinent labs reviewed    Pulmonary                Comments: Pneumonia a few weeks ago - reports her breathing is back to normal    Neuro/Psych         Psychiatric history     Cardiovascular                       GI/Hepatic/Renal  Within defined limits              Endo/Other      Hypothyroidism  Obesity     Other Findings              Physical Exam    Airway  Mallampati: III  TM Distance: 4 - 6 cm  Neck ROM: normal range of motion   Mouth opening: Diminished (comment)     Cardiovascular    Rhythm: regular  Rate: normal         Dental  No notable dental hx       Pulmonary  Breath sounds clear to auscultation               Abdominal  GI exam deferred       Other Findings            Anesthetic Plan    ASA: 2  Anesthesia type: epidural            Anesthetic plan and risks discussed with: Patient, Mother and Spouse

## 2022-05-11 NOTE — PROGRESS NOTES
Dr. Marily Conway updated on SVE below. Pt to labor down and recheck in 1 hour.        05/11/22 0340   Cervical Exam   Dilation (cm) 9   Eff 100 %   Station 0

## 2022-05-11 NOTE — ROUTINE PROCESS
SBAR IN Report: Mother    Verbal report received from Nela Hayes RN (full name & credentials) on this patient, who is now being transferred from [facility] (unit) for routine progression of care. The patient is not wearing a green \"Anesthesia-Duramorph\" band. Report consisted of patient's Situation, Background, Assessment and Recommendations (SBAR). [place] ID bands were compared with the identification form, and verified with the patient and transferring nurse. Information from the SBAR and the Pedro Report was reviewed with the transferring nurse; opportunity for questions and clarification provided.

## 2022-05-11 NOTE — PROGRESS NOTES
Percocet 2 tab PO given to pt per request.  Educated pt to call out if pain medication does not help.

## 2022-05-11 NOTE — PROGRESS NOTES
Dr. Olimpia Cancino updated on SVE below. Orders to start pitocin at 1 and increase by 1 every 30 min.     05/10/22 6457   Cervical Exam   Dilation (cm) 3   Eff 80 %   Station -3   Vaginal exam done byJonnie Mosquera

## 2022-05-11 NOTE — PROGRESS NOTES
Dr. Dory Ferguson updated on SVE below and pt continuing to have pain with ctx despite stadol administration. Ok to get epidural per MD     05/10/22 2023   Cervical Exam   Dilation (cm) 2   Eff 80 %   Station -3   Vaginal exam done by?   Erna Jin

## 2022-05-11 NOTE — PROGRESS NOTES
Patient up to bathroom without need for assistance. Allie-care taught and completed. Voided 400 ml. Questions encouraged and answered. Patient back to bed, encouraged to call for needs or concerns. Verbalizes understanding.

## 2022-05-12 LAB
BACTERIA SPEC CULT: NORMAL
SERVICE CMNT-IMP: NORMAL

## 2022-05-12 PROCEDURE — 74011250637 HC RX REV CODE- 250/637: Performed by: OBSTETRICS & GYNECOLOGY

## 2022-05-12 PROCEDURE — 65270000029 HC RM PRIVATE

## 2022-05-12 PROCEDURE — 2709999900 HC NON-CHARGEABLE SUPPLY

## 2022-05-12 RX ORDER — DOCUSATE SODIUM 100 MG/1
100 CAPSULE, LIQUID FILLED ORAL 2 TIMES DAILY
Status: DISCONTINUED | OUTPATIENT
Start: 2022-05-12 | End: 2022-05-13 | Stop reason: HOSPADM

## 2022-05-12 RX ORDER — FUROSEMIDE 20 MG/1
20 TABLET ORAL DAILY
Status: DISCONTINUED | OUTPATIENT
Start: 2022-05-12 | End: 2022-05-13 | Stop reason: HOSPADM

## 2022-05-12 RX ADMIN — LABETALOL HYDROCHLORIDE 200 MG: 200 TABLET, FILM COATED ORAL at 10:41

## 2022-05-12 RX ADMIN — DOCUSATE SODIUM 100 MG: 100 CAPSULE ORAL at 20:17

## 2022-05-12 RX ADMIN — OXYCODONE HYDROCHLORIDE AND ACETAMINOPHEN 1 TABLET: 5; 325 TABLET ORAL at 20:08

## 2022-05-12 RX ADMIN — IBUPROFEN 400 MG: 400 TABLET ORAL at 12:46

## 2022-05-12 RX ADMIN — LABETALOL HYDROCHLORIDE 200 MG: 200 TABLET, FILM COATED ORAL at 17:33

## 2022-05-12 RX ADMIN — OXYCODONE HYDROCHLORIDE AND ACETAMINOPHEN 1 TABLET: 5; 325 TABLET ORAL at 10:41

## 2022-05-12 RX ADMIN — OXYCODONE HYDROCHLORIDE AND ACETAMINOPHEN 1 TABLET: 5; 325 TABLET ORAL at 15:17

## 2022-05-12 RX ADMIN — IBUPROFEN 400 MG: 400 TABLET ORAL at 07:04

## 2022-05-12 RX ADMIN — FUROSEMIDE 20 MG: 20 TABLET ORAL at 10:41

## 2022-05-12 RX ADMIN — LEVOTHYROXINE SODIUM 50 MCG: 0.05 TABLET ORAL at 07:05

## 2022-05-12 RX ADMIN — OXYCODONE HYDROCHLORIDE AND ACETAMINOPHEN 1 TABLET: 5; 325 TABLET ORAL at 23:48

## 2022-05-12 RX ADMIN — OXYCODONE HYDROCHLORIDE AND ACETAMINOPHEN 1 TABLET: 5; 325 TABLET ORAL at 00:26

## 2022-05-12 RX ADMIN — IBUPROFEN 400 MG: 400 TABLET ORAL at 17:33

## 2022-05-12 RX ADMIN — VENLAFAXINE HYDROCHLORIDE 150 MG: 150 CAPSULE, EXTENDED RELEASE ORAL at 10:41

## 2022-05-12 NOTE — PROGRESS NOTES
Shift assessment complete as noted. Patient request pain medicine. Questions encouraged and answered. Encouraged to call for needs or concerns. Verbalizes understanding. Plan of care reviewed and whiteboard updated.

## 2022-05-12 NOTE — PROGRESS NOTES
Post-Partum Day Number 1 Progress Note    Patient doing well  without significant complaints. Pain controlled on current medication. Voiding without difficulty, normal lochia. Baby in nicu    Vitals:    Visit Vitals  /80 (BP 1 Location: Right arm, BP Patient Position: Supine)   Pulse 87   Temp 99.1 °F (37.3 °C)   Resp 18   Ht 5' (1.524 m)   Wt 187 lb (84.8 kg)   LMP 09/13/2021   SpO2 97%   Breastfeeding Yes   BMI 36.52 kg/m²       Vital signs stable, afebrile. Exam:  Patient without distress. Heart rrr  Lungs cta b&s               Abdomen soft, fundus firm at level of umbilicus, nontender. Lower extremities are negative for  cords or tenderness. +2 tibial edema     Lab Results   Component Value Date/Time    ABO Group A 11/03/2021 11:29 AM    Rh (D) Positive 11/03/2021 11:29 AM    ABO/Rh(D) A POSITIVE 05/10/2022 01:40 PM        Lab Results   Component Value Date/Time    ABO/Rh(D) A POSITIVE 05/10/2022 01:40 PM    Antibody screen NEG 05/10/2022 01:40 PM    Antibody screen, External negative 11/03/2021 12:00 AM    Rubella, External immune 11/03/2021 12:00 AM    ABO,Rh A positive 11/03/2021 12:00 AM     Lab Results   Component Value Date/Time    HGB 9.9 (L) 05/11/2022 10:19 AM    Hgb, External 11.8 11/03/2021 12:00 AM     -560 for diuresis     Assessment and Plan:  Patient appears to be having uncomplicated post-partum course. Continue routine post-delivery care and maternal education. Breastfeeding / pumping. pree likely. On labetalol 200mg bid. Will give lasix 20mg x 5 days.

## 2022-05-12 NOTE — PROGRESS NOTES
SW unsuccessfully attempted to meet with patient throughout the day.   SW will follow-up in [address] [facility], [place], [address] [facility]   [phone]

## 2022-05-12 NOTE — LACTATION NOTE
Lactation visit. Mom resting. Has pumped every 3 hours. Colostrum volume down some, lowest volume so far 1.5ml. reassured mom about normalcy. Pump every 3 hours. Mom doing well with pumping, no issues. Nipples tender, lanolin given. Mom states she may get to attempt latching today with infant, offered LC assist if needed. Call out for help with pumping as needed.

## 2022-05-12 NOTE — PROGRESS NOTES
Problem: Vaginal Delivery: Day of Delivery-Post delivery  Goal: Consults, if ordered  Outcome: Progressing Towards Goal  Goal: Discharge Planning  Outcome: Progressing Towards Goal  Goal: Medications  Outcome: Progressing Towards Goal  Goal: Treatments/Interventions/Procedures  Outcome: Progressing Towards Goal  Goal: *Vital signs within defined limits  Outcome: Progressing Towards Goal  Goal: *Optimal pain control at patient's stated goal  Outcome: Progressing Towards Goal  Goal: *Participates in infant care  Outcome: Progressing Towards Goal     Problem: Vaginal Delivery: Day of Delivery-Post delivery  Goal: Activity/Safety  Outcome: Resolved/Met  Goal: Nutrition/Diet  Outcome: Resolved/Met  Goal: *Labs within defined limits  Outcome: Resolved/Met  Goal: *Hemodynamically stable  Outcome: Resolved/Met  Goal: *Demonstrates progressive activity  Outcome: Resolved/Met  Goal: *Tolerating diet  Outcome: Resolved/Met

## 2022-05-12 NOTE — ANESTHESIA POSTPROCEDURE EVALUATION
* No procedures listed *. No value filed.     Anesthesia Post Evaluation      Multimodal analgesia: multimodal analgesia used between 6 hours prior to anesthesia start to PACU discharge  Patient location during evaluation: floor  Patient participation: complete - patient participated  Level of consciousness: awake and alert  Pain management: adequate  Airway patency: patent  Anesthetic complications: no  Cardiovascular status: acceptable  Respiratory status: acceptable  Hydration status: acceptable  Post anesthesia nausea and vomiting:  controlled  Final Post Anesthesia Temperature Assessment:  Normothermia (36.0-37.5 degrees C)      INITIAL Post-op Vital signs:  Visit Vitals  /80 (BP 1 Location: Right arm, BP Patient Position: Supine)   Pulse 87   Temp 37.3 °C (99.1 °F)   Resp 18   Ht 5' (1.524 m)   Wt 84.8 kg (187 lb)   SpO2 97%   Breastfeeding Yes   BMI 36.52 kg/m²

## 2022-05-12 NOTE — PROGRESS NOTES
IV from left hand removed with cath tip intact, pressure dressing applied. Pt in bed with call light in reach.

## 2022-05-13 VITALS
TEMPERATURE: 98.4 F | HEIGHT: 60 IN | DIASTOLIC BLOOD PRESSURE: 79 MMHG | HEART RATE: 77 BPM | BODY MASS INDEX: 36.71 KG/M2 | OXYGEN SATURATION: 97 % | SYSTOLIC BLOOD PRESSURE: 125 MMHG | RESPIRATION RATE: 16 BRPM | WEIGHT: 187 LBS

## 2022-05-13 PROCEDURE — 74011250637 HC RX REV CODE- 250/637: Performed by: OBSTETRICS & GYNECOLOGY

## 2022-05-13 RX ORDER — FUROSEMIDE 20 MG/1
20 TABLET ORAL DAILY
Qty: 3 TABLET | Refills: 0 | Status: SHIPPED | OUTPATIENT
Start: 2022-05-13 | End: 2022-05-17 | Stop reason: ALTCHOICE

## 2022-05-13 RX ORDER — LABETALOL 200 MG/1
200 TABLET, FILM COATED ORAL 2 TIMES DAILY
Qty: 60 TABLET | Refills: 1 | Status: SHIPPED | OUTPATIENT
Start: 2022-05-13

## 2022-05-13 RX ORDER — ACETAMINOPHEN 500 MG
1000 TABLET ORAL
Qty: 60 TABLET | Refills: 1 | Status: SHIPPED
Start: 2022-05-13 | End: 2022-05-17 | Stop reason: ALTCHOICE

## 2022-05-13 RX ORDER — IBUPROFEN 800 MG/1
800 TABLET ORAL
Qty: 30 TABLET | Refills: 0 | Status: SHIPPED | OUTPATIENT
Start: 2022-05-13

## 2022-05-13 RX ORDER — OXYCODONE HYDROCHLORIDE 5 MG/1
5 TABLET ORAL
Qty: 12 TABLET | Refills: 0 | Status: SHIPPED | OUTPATIENT
Start: 2022-05-13 | End: 2022-05-16

## 2022-05-13 RX ADMIN — OXYCODONE HYDROCHLORIDE AND ACETAMINOPHEN 1 TABLET: 5; 325 TABLET ORAL at 06:29

## 2022-05-13 RX ADMIN — OXYCODONE HYDROCHLORIDE AND ACETAMINOPHEN 1 TABLET: 5; 325 TABLET ORAL at 10:01

## 2022-05-13 RX ADMIN — LABETALOL HYDROCHLORIDE 200 MG: 200 TABLET, FILM COATED ORAL at 10:00

## 2022-05-13 RX ADMIN — LEVOTHYROXINE SODIUM 50 MCG: 0.05 TABLET ORAL at 07:50

## 2022-05-13 RX ADMIN — VENLAFAXINE HYDROCHLORIDE 150 MG: 150 CAPSULE, EXTENDED RELEASE ORAL at 10:01

## 2022-05-13 RX ADMIN — DOCUSATE SODIUM 100 MG: 100 CAPSULE ORAL at 09:59

## 2022-05-13 RX ADMIN — FUROSEMIDE 20 MG: 20 TABLET ORAL at 10:00

## 2022-05-13 NOTE — DISCHARGE SUMMARY
Obstetrical Discharge Summary     Name: Maximiliano Chi MRN: [id number]  SSN: [id number]    YOB: 2000  Age: 23 y.o. Sex: female      Allergies: Patient has no known allergies. Admit Date: 5/10/2022    Discharge Date: 2022     Admitting Physician: Yanni Addison MD     Attending Physician:  Issac Owens, *     * Admission Diagnoses:  premature rupture of membranes (PPROM) with unknown onset of labor [O42.919]    * Discharge Diagnoses:   Information for the patient's :  Leah Moses [[id number]]   Delivery of a 5 lb 11.4 oz (2.59 kg) female infant via Vaginal, Spontaneous on 2022 at 5:38 AM  by Hu Iqbal. Apgars were 6  and 9 . Additional Diagnoses:   Hospital Problems as of 2022 Date Reviewed: 2022          Codes Class Noted - Resolved POA    Preeclampsia, third trimester ICD-10-CM: O14.93  ICD-9-CM: 642.43  2022 - Present Yes    Overview Signed 2022  4:09 PM by Emmett Dasilva RN     22 PreE labs WNL, 24 hr urine 312  2022 at [facility]: BP Mild range elevations today 135/88. Reports daily HA and BUE & BLE edema. · Repeat preeclamptic labs in your office for elevated BP or symptoms. (CBC, CMP, LDH, Uric Acid); also do P/C ratio (if elevated, need to then confirm with 24h urine) or 24 hour urine (for total protein and creatinine clearance). * (Principal)  premature rupture of membranes (PPROM) with unknown onset of labor ICD-10-CM: O42.919  ICD-9-CM: 658.10  5/10/2022 - Present Unknown        High-risk pregnancy in third trimester ICD-10-CM: O09.93  ICD-9-CM: V23.9  11/3/2021 - Present Yes    Overview Addendum 2022  4:06 PM by Emmett Dasilva RN     NIPT-negative x 3  CF/SMA- negative    GTT-129  HGB-10.6-iron rich diet, PNV with iron.     COVID-not vaccinated-considering    Flu-    Tdap- 22 PreE labs WNL, 24 hr urine 312  2022 at [facility]: Reassuring fetal status; JARETT 15cm, BPP , Dopplers WNL             Hypothyroid in pregnancy, antepartum, third trimester ICD-10-CM: O99.283, E03.9  ICD-9-CM: 648.13, 244.9  Unknown - Present Yes    Overview Addendum 2022  3:47 PM by Lily Easton RN     11/3/21 TSH-4.220  T4-0.95    Levothyroxine 25 mcg dialy  Check today 2  22 TSH-2.900  T4-0.78-increase to 37 mcg. 22 TSH 3.7  2022 at [facility]: Takes Synthroid 25 mcg daily po    · Recommend evaluation of TSH each trimester. Goal to keep TSH <2-2.5. · If medication change, repeat TFTs in 4 weeks. · If poorly controlled thyroid, recommend growth evaluation in 3rd trimester. Lab Results   Component Value Date/Time    ABO/Rh(D) A POSITIVE 05/10/2022 01:40 PM    Rubella, External immune 2021 12:00 AM    ABO,Rh A positive 2021 12:00 AM      Immunization History   Administered Date(s) Administered    Hep B Vaccine (Adult) 10/13/2021    Influenza Vaccine 2018, 2018, 10/20/2020, 10/20/2020    Influenza Vaccine (Quad) PF (>6 Mo Flulaval, Fluarix, and >3 Yrs Afluria, Fluzone [id number]) 2017, 2017, 2018    TB Skin Test (PPD) Intradermal 2017, 2017    Tdap 2018, 2018, 2022       * Procedures: vaginal delivery  Procedure(s):   SECTION           * Discharge Condition: good    * Hospital Course: Postpartum course was complicated by hypertension, which added 0 days to the patient's length of stay. * Disposition: Home    Discharge Medications:   Current Discharge Medication List      START taking these medications    Details   furosemide (LASIX) 20 mg tablet Take 1 Tablet by mouth daily for 3 days. Qty: 3 Tablet, Refills: 0  Start date: 2022, End date: 2022      labetaloL (NORMODYNE) 200 mg tablet Take 1 Tablet by mouth two (2) times a day.   Qty: 60 Tablet, Refills: 1  Start date: 2022      ibuprofen (MOTRIN) 800 mg tablet Take 1 Tablet by mouth every six (6) hours as needed for Pain. Qty: 30 Tablet, Refills: 0  Start date: 2022      oxyCODONE IR (Roxicodone) 5 mg immediate release tablet Take 1 Tablet by mouth every six (6) hours as needed for Pain for up to 3 days. Max Daily Amount: 20 mg.  Qty: 12 Tablet, Refills: 0  Start date: 2022, End date: 2022    Associated Diagnoses:  premature rupture of membranes (PPROM) with unknown onset of labor      acetaminophen (Tylenol Extra Strength) 500 mg tablet Take 2 Tablets by mouth every six (6) hours as needed for Pain. Qty: 60 Tablet, Refills: 1  Start date: 2022         CONTINUE these medications which have NOT CHANGED    Details   venlafaxine-SR (EFFEXOR-XR) 150 mg capsule Take 1 Capsule by mouth daily. Indications: anxiousness associated with depression  Qty: 30 Capsule, Refills: 3      albuterol sulfate 90 mcg/actuation aebs Take 2 Puffs by inhalation four (4) times daily as needed for Wheezing. Qty: 1 Each, Refills: 1      prenatal vit 91/iron/folic/dha (PRENATAL + DHA PO) Take  by mouth. STOP taking these medications       levothyroxine (SYNTHROID) 25 mcg tablet Comments:   Reason for Stopping:               * Follow-up Care/Patient Instructions:   Activity: No sex for 6 weeks, No driving while on analgesics and No heavy lifting for 4 weeks  Diet: Regular Diet  Wound Care: Keep wound clean and dry    Follow-up Information     Follow up With Specialties Details Why [address] [facility] , [address] [place], [place] Physician Assistant   Adelaida   [address] [facility] [address] [phone]

## 2022-05-13 NOTE — PROGRESS NOTES
Post-Partum Day Number 2 Progress/Discharge Note    Patient doing well post-partum without significant complaint. Voiding without difficulty, normal lochia, positive flatus. BP stable    Vitals:  Patient Vitals for the past 8 hrs:   BP Temp Pulse Resp SpO2   22 0743 127/82 98.2 °F (36.8 °C) 78 16 97 %   22 0250 127/88 98.5 °F (36.9 °C) 73 16 97 %     Temp (24hrs), Av.3 °F (36.8 °C), Min:98 °F (36.7 °C), Max:98.5 °F (36.9 °C)      Vital signs stable, afebrile. Exam:  Patient without distress. Abdomen soft, fundus firm at level of umbilicus, non tender               Lower extremities are negative for swelling, cords or tenderness. Lab/Data Review: All lab results for the last 24 hours reviewed. Assessment and Plan:  Patient appears to be having uncomplicated post-partum course. Continue routine perineal care and maternal education. Plan discharge for today with follow up in our office in 1 week. Labetalol and lasix.

## 2022-05-13 NOTE — DISCHARGE INSTRUCTIONS
Vaginal Childbirth: What To Expect At Home    Your Recovery: Your body will slowly heal in the next few weeks. It is easy to get too tired and overwhelmed during the first weeks after your baby is born. Changes in your hormones can shift your mood without warning. You may find it hard to meet the extra demands on your energy and time. Take it easy on yourself. Follow-up care is a key part of your treatment and safety. Be sure to make and go to all appointments, and call your doctor if you are having problems. It's also a good idea to know your test results and keep a list of the medicines you take. How can you care for yourself at home? Vaginal bleeding and cramps  · After delivery, you will have a bloody discharge from the vagina. This will turn pink within a week and then white or yellow after about 10 days. It may last for 2 to 4 weeks or longer, until the uterus has healed. Use pads instead of tampons until you stop bleeding. · Do not worry if you pass some blood clots, as long as they are smaller than a golf ball. If you have a tear or stitches in your vaginal area, change the pad at least every 4 hours to prevent soreness and infection. · You may have cramps for the first few days after childbirth. These are normal and occur as the uterus shrinks to normal size. Take an over-the-counter pain medicine, such as acetaminophen (Tylenol), ibuprofen (Advil, Motrin), or naproxen (Aleve), for cramps. Read and follow all instructions on the label. Do not take aspirin, because it can cause more bleeding. Do not take acetaminophen (Tylenol) and other acetaminophen containing medications (i.e. Percocet) at the same time. Breast fullness  · Your breasts may overfill (engorge) in the first few days after delivery. To help milk flow and to relieve pain, warm your breasts in the shower or by using warm, moist towels before nursing.   · If you are not nursing, do not put warmth on your breasts or touch your breasts. Wear a tight bra or sports bra and use ice until the fullness goes away. This usually takes 2 to 3 days. · Put ice or a cold pack on your breast after nursing to reduce swelling and pain. Put a thin cloth between the ice and your skin. Activity  · Eat a balanced diet. Do not try to lose weight by cutting calories. Keep taking your prenatal vitamins, or take a multivitamin. · Get as much rest as you can. Try to take naps when your baby sleeps during the day. · Get some exercise every day. But do not do any heavy exercise until your doctor says it is okay. · Wait until you are healed (about 4 to 6 weeks) before you have sexual intercourse. Your doctor will tell you when it is okay to have sex. · Talk to your doctor about birth control. You can get pregnant even before your period returns. Also, you can get pregnant while you are breast-feeding. Mental Health  · Many women get the \"baby blues\" during the first few days after childbirth. You may lose sleep, feel irritable, and cry easily. You may feel happy one minute and sad the next. Hormone changes are one cause of these emotional changes. Also, the demands of a new baby, along with visits from relatives or other family needs, add to a mother's stress. The \"baby blues\" often peak around the fourth day. Then they ease up in less than 2 weeks. · If your moodiness or anxiety lasts for more than 2 weeks, or if you feel like life is not worth living, you may have postpartum depression. This is different for each mother. Some mothers with serious depression may worry intensely about their infant's well-being. Others may feel distant from their child. Some mothers might even feel that they might harm their baby. A mother may have signs of paranoia, wondering if someone is watching her. · With all the changes in your life, you may not know if you are depressed.  Pregnancy sometimes causes changes in how you feel that are similar to the symptoms of depression. · Symptoms of depression include:  · Feeling sad or hopeless and losing interest in daily activities. These are the most common symptoms of depression. · Sleeping too much or not enough. · Feeling tired. You may feel as if you have no energy. · Eating too much or too little. · POSTPARTUM SUPPORT INTERNATIONAL (PSI) offers a Warm line; Chat with the Expert phone sessions; Information and Articles about Pregnancy and Postpartum Mood Disorders; Comprehensive List of Free Support Groups; Knowledgeable local coordinators who will offer support, information, and resources; Guide to Resources on [employer]; Calendar of events in the  mood disorders community; Latest News and Research; and [facility] [address] for [employer]. Remember - You are not alone; You are not to blame; With help, you will be well. 8-505-715-PPD(8788). WWW. POSTPARTUM. NET   · Writing or talking about death, such as writing suicide notes or talking about guns, knives, or pills. Keep the numbers for these national suicide hotlines: 2-376-939-TALK (4-551.866.8454) and 6-678-XAPHCBV (4-728.474.4772). If you or someone you know talks about suicide or feeling hopeless, get help right away. Constipation and Hemorrhoids  · Drink plenty of fluids, enough so that your urine is light yellow or clear like water. If you have kidney, heart, or liver disease and have to limit fluids, talk with your doctor before you increase the amount of fluids you drink. · Eat plenty of fiber each day. Have a bran muffin or bran cereal for breakfast, and try eating a piece of fruit for a mid-afternoon snack. · For painful, itchy hemorrhoids, put ice or a cold pack on the area several times a day for 10 minutes at a time. Follow this by putting a warm compress on the area for another 10 to 20 minutes or by sitting in a shallow, warm bath. When should you call for help? Call 911 anytime you think you may need emergency care.  For example, call if:  · You are thinking of hurting yourself, your baby, or anyone else. · You passed out (lost consciousness). · You have symptoms of a blood clot in your lung (called a pulmonary embolism). These may include:    · Sudden chest pain. · Trouble breathing. · Coughing up blood. Call your doctor now or seek immediate medical care if:  · You have severe vaginal bleeding. · You are soaking through a pad each hour for 2 or more hours. · Your vaginal bleeding seems to be getting heavier or is still bright red 4 days after delivery. · You are dizzy or lightheaded, or you feel like you may faint. · You are vomiting or cannot keep fluids down. · You have a fever. · You have new or more belly pain. · You pass tissue (not just blood). · Your vaginal discharge smells bad. · Your belly feels tender or full and hard. · Your breasts are continuously painful or red. · You feel sad, anxious, or hopeless for more than a few days. · You have sudden, severe pain in your belly. · You have symptoms of a blood clot in your leg (called a deep vein thrombosis),          such as:  · Pain in your calf, back of the knee, thigh, or groin. · Redness and swelling in your leg or groin. · You have symptoms of preeclampsia, such as:  · Sudden swelling of your face, hands, or feet. · New vision problems (such as dimness or blurring). · A severe headache. · Your blood pressure is higher than it should be or rises suddenly. · You have new nausea or vomiting. Watch closely for changes in your health, and be sure to contact your doctor if you have any problems. DISCHARGE SUMMARY from Nurse      What to do at Home:    Nothing in the vagina for 6 weeks. Call MD if experiencing heavy vaginal bleeding greater than 1 pad per hour, temperature over 100.4, foul smelling vaginal discharge, thoughts of suicide or homicide, symptoms of postpartum depression or mastitis, or any other major medical concerns.           * Please give a list of your current medications to your Primary Care Provider. *  Please update this list whenever your medications are discontinued, doses are      changed, or new medications (including over-the-counter products) are added. *  Please carry medication information at all times in case of emergency situations. These are general instructions for a healthy lifestyle:    No smoking/ No tobacco products/ Avoid exposure to second hand smoke  Surgeon General's Warning:  Quitting smoking now greatly reduces serious risk to your health. Obesity, smoking, and sedentary lifestyle greatly increases your risk for illness    A healthy diet, regular physical exercise & weight monitoring are important for maintaining a healthy lifestyle    You may be retaining fluid if you have a history of heart failure or if you experience any of the following symptoms:  Weight gain of 3 pounds or more overnight or 5 pounds in a week, increased swelling in our hands or feet or shortness of breath while lying flat in bed. Please call your doctor as soon as you notice any of these symptoms; do not wait until your next office visit. The discharge information has been reviewed with the patient. The patient verbalized understanding. Discharge medications reviewed with the patient and appropriate educational materials and side effects teaching were provided.   ___________________________________________________________________________________________________________________________________

## 2022-05-13 NOTE — ROUTINE PROCESS
Discharge teaching complete. Patient verbalizes understanding. Questions encouraged and answered.        Patient to call out when ready to be discharged from room

## 2022-05-13 NOTE — PROGRESS NOTES
Chart reviewed - first time parent; depression/anxiety; baby in NICU (34.2); EPDS = 17.    SW met with patient while social distancing w/appropriate PPE. Discussed how patient is coping with baby Myrna's need to be in the NICU - support provided. Patient was provided the opportunity to share how she emotionally felt during pregnancy. Patient was on Lexapro for approximately 1 year before becoming pregnant. She then stopped this medication \"cold turkey\" when she learned she was pregnant. Per patient, \"I couldn't quiet my mind\" during pregnancy. Patient described having severe anxiety, difficulty sleeping, as well as depressive thoughts. She resumed her Effexor after delivery and states that she can \"already tell a difference. \"  Patient's medications are managed by Brooke at [facility]. Patient lives with her boyfriend and mother Karl Andino. She states that her mom is emotionally supportive and \"talks to me and tries to help me. \"  Additionally, patient has \"lots of family here. \"  Patient has previously participated in therapy as she was \"seeing someone at [facility]. \"  She learned coping skills, which she has found helpful, but she's not currently seeing a therapist.  Patient's primary insurance is currently , but she has Medicaid secondary. SW educated patient on mental health support available thru [facility]'s IMPACTT Program (7-519.109.8844). Patient agreeable for  to provide contact information for program.  Patient is aware that she is to contact program for additional information. She will then be contacted by a Critical access hospital Coordinator to be enrolled in support services.  provided education and literature on support available thru Postpartum Support International (PSI). PSI Warmline:  8-333-806-4PPD (6426). WWW. POSTPARTUM. NET    Family was informed of signs/symptoms, forms of intervention (medication, counseling, education), and resources (local coordinators available telephonically, monthly support group in [place], weekly \"chat with expert\" phone sessions). Additionally, patient was provided with [facility] Checklist.\"      Discussed importance of self-care and accepting help when offered. Family was encouraged to contact me with any questions/needs -  contact information provided. EPDS = 17 (OB office notified via fax). Patient provided with counseling resources:  [facility]  [phone] (text or call)  www.[employer] (AMI). org  - Provides virtual visits    [address], [address] [place] [address]  [phone]  F: [phone]  www.[email or web address]. OpenWhere  -  will need to fax referral.  Please notify Lilo Call if youre interested in this practice. Adela [address]  www. [email or web address]. [employer]Where  - Provides virtual visits    [facility] (formerly Katerina Lees)  [phone]  www.[email or web address]  - Provides virtual visits    BREE Ryder[place], [address] [facility]   [phone]

## 2022-05-26 DIAGNOSIS — E55.9 VITAMIN D DEFICIENCY DISEASE: ICD-10-CM

## 2022-05-26 DIAGNOSIS — E06.3 AUTOIMMUNE THYROIDITIS: Primary | ICD-10-CM

## 2022-06-01 ENCOUNTER — TELEPHONE (OUTPATIENT)
Dept: OBGYN CLINIC | Age: 22
End: 2022-06-01

## 2022-06-08 ENCOUNTER — POSTPARTUM VISIT (OUTPATIENT)
Dept: OBGYN CLINIC | Age: 22
End: 2022-06-08

## 2022-06-08 VITALS
SYSTOLIC BLOOD PRESSURE: 120 MMHG | WEIGHT: 156.8 LBS | HEIGHT: 60 IN | BODY MASS INDEX: 30.78 KG/M2 | DIASTOLIC BLOOD PRESSURE: 72 MMHG

## 2022-06-08 DIAGNOSIS — N89.8 VAGINAL IRRITATION: ICD-10-CM

## 2022-06-08 DIAGNOSIS — R39.198 DIFFICULTY URINATING: ICD-10-CM

## 2022-06-08 DIAGNOSIS — R10.2 PELVIC PRESSURE IN FEMALE: ICD-10-CM

## 2022-06-08 LAB
BACTERIA, WET MOUNT, POC: NORMAL
BILIRUBIN, URINE, POC: NEGATIVE
BLOOD URINE, POC: NEGATIVE
CLUE CELLS, WET MOUNT, POC: PRESENT
GLUCOSE URINE, POC: NEGATIVE
KETONES, URINE, POC: NEGATIVE
LEUKOCYTE ESTERASE, URINE, POC: NORMAL
NITRITE, URINE, POC: NEGATIVE
PH, URINE, POC: 6 (ref 4.6–8)
PROTEIN,URINE, POC: NEGATIVE
RBC WET MOUNT, POC: NORMAL
SPECIFIC GRAVITY, URINE, POC: 1.02 (ref 1–1.03)
TRICH, WET MOUNT, POC: NORMAL
URINALYSIS CLARITY, POC: CLEAR
URINALYSIS COLOR, POC: YELLOW
UROBILINOGEN, POC: NORMAL
WBC, WET MOUNT, POC: NORMAL
YEAST, WET MOUNT, POC: NORMAL

## 2022-06-08 PROCEDURE — 87210 SMEAR WET MOUNT SALINE/INK: CPT | Performed by: OBSTETRICS & GYNECOLOGY

## 2022-06-08 PROCEDURE — 99902 PR PRENATAL VISIT: CPT | Performed by: OBSTETRICS & GYNECOLOGY

## 2022-06-08 RX ORDER — VENLAFAXINE HYDROCHLORIDE 150 MG/1
150 CAPSULE, EXTENDED RELEASE ORAL DAILY
COMMUNITY
Start: 2022-05-09 | End: 2022-08-09 | Stop reason: SDUPTHER

## 2022-06-08 RX ORDER — LABETALOL 200 MG/1
TABLET, FILM COATED ORAL
COMMUNITY
Start: 2022-05-13 | End: 2022-08-09

## 2022-06-08 RX ORDER — ACETAMINOPHEN AND CODEINE PHOSPHATE 120; 12 MG/5ML; MG/5ML
1 SOLUTION ORAL DAILY
Qty: 30 TABLET | Refills: 11 | Status: SHIPPED | OUTPATIENT
Start: 2022-06-08

## 2022-06-08 RX ORDER — METRONIDAZOLE 7.5 MG/G
1 GEL VAGINAL DAILY
Qty: 70 G | Refills: 0 | Status: SHIPPED | OUTPATIENT
Start: 2022-06-08 | End: 2022-06-13

## 2022-06-08 NOTE — PROGRESS NOTES
Patient here 4 weeks after  vaginal delivery without  complaints  Her concerns are of vaginal discharge with odor and constipation. Wet prep reveals BV. Rx sent. Miralax recommended daily. Lochia is appropriate. Moods are appropriate. Breasts are doing well. Infant is thriving, coming home from NICU tomorrow. Vulva is intact with normal exam with odor noted. .  She is given contraceptive options as indicated. She will followup as indicated for AE or prn.

## 2022-06-27 ENCOUNTER — FOLLOWUP TELEPHONE ENCOUNTER (OUTPATIENT)
Dept: CASE MANAGEMENT | Age: 22
End: 2022-06-27

## 2022-06-27 NOTE — TELEPHONE ENCOUNTER
Phone call to patient at [phone] due to EPDS score of 17 after delivery. No answer; message left requesting call back.     KELECHI Michaels, [address] [facility]   [phone]

## 2022-07-06 ENCOUNTER — FOLLOWUP TELEPHONE ENCOUNTER (OUTPATIENT)
Dept: CASE MANAGEMENT | Age: 22
End: 2022-07-06

## 2022-07-06 NOTE — TELEPHONE ENCOUNTER
Phone call to patient at [phone] due to EPDS score of 17 after delivery.       No answer; message left requesting call back.     KELECHI Griffin, [address] [facility]   [phone]

## 2022-07-13 RX ORDER — LABETALOL 200 MG/1
TABLET, FILM COATED ORAL
Qty: 180 TABLET | OUTPATIENT
Start: 2022-07-13

## 2022-08-03 ENCOUNTER — TELEPHONE (OUTPATIENT)
Dept: INTERNAL MEDICINE CLINIC | Facility: CLINIC | Age: 22
End: 2022-08-03

## 2022-08-03 NOTE — TELEPHONE ENCOUNTER
Saw Salvador 4/27/2020 and I see Dr Ruth Llanos had ordered Effexor 150 mg QD back on 5/9/22  I am not sure about what meds she took and when. Maybe she needs to call her pharmacy ?

## 2022-08-03 NOTE — TELEPHONE ENCOUNTER
----- Message from Aliya Diaz sent at 8/3/2022  2:07 PM EDT -----  Subject: Message to Provider    QUESTIONS  Information for Provider? Pt would like to know if office received medical   records for her back in 2019 back when she established care with PCP   Salvador. She was formally with Roula and looking to see when she started   anxiety medications. She is try to appeal a ruling at her college. Please   advise.  ---------------------------------------------------------------------------  --------------  Radha ALEX  [phone]; OK to leave message on voicemail  ---------------------------------------------------------------------------  --------------  SCRIPT ANSWERS  Relationship to Patient?  Self

## 2022-08-04 ENCOUNTER — OFFICE VISIT (OUTPATIENT)
Dept: OBGYN CLINIC | Age: 22
End: 2022-08-04

## 2022-08-04 ENCOUNTER — FOLLOWUP TELEPHONE ENCOUNTER (OUTPATIENT)
Dept: CASE MANAGEMENT | Age: 22
End: 2022-08-04

## 2022-08-04 VITALS
DIASTOLIC BLOOD PRESSURE: 82 MMHG | SYSTOLIC BLOOD PRESSURE: 108 MMHG | WEIGHT: 155.8 LBS | BODY MASS INDEX: 30.59 KG/M2 | HEIGHT: 60 IN

## 2022-08-04 PROCEDURE — 99902 PR PRENATAL VISIT: CPT | Performed by: NURSE PRACTITIONER

## 2022-08-04 NOTE — PROGRESS NOTES
12 Week Postpartum Visit    Name: Get Branch    Date: 2022    Age: 23 y.o.    OB History          2    Para   1    Term   0       1    AB   1    Living   1         SAB   1    IAB        Ectopic        Molar        Multiple        Live Births   1              /82   Ht 5' (1.524 m)   Wt 155 lb 12.8 oz (70.7 kg)        Delivered by Dr. Phil Roland on 2022 by Vaginal delivery. Has not been on labetolol for several weeks. BP today stable. No s/sx pre E. No concerns    Infant's Name: Satnam Palafox Infant's Gender: girl  Birth Weight: 5.11 LBS Feeding: breast feeding  Desired Contraception:taking norethrindrone  Current Outpatient Medications   Medication Sig Dispense Refill    venlafaxine (EFFEXOR XR) 150 MG extended release capsule Take 150 mg by mouth daily      Prenatal Vit w/Ty-Bwdpfeves-RU (PNV PO) Take by mouth      norethindrone (ORTHO MICRONOR) 0.35 MG tablet Take 1 tablet by mouth daily 30 tablet 11    labetalol (NORMODYNE) 200 MG tablet TAKE 1 TABLET BY MOUTH TWICE DAILY       No current facility-administered medications for this visit. Physical Exam  OBGyn Exam     Doing well   No complaints  Moods bright  Breasts and nipples doing well with breastfeeding  Ok to stay off labetalol  Bleeding stopped  Continue micronor as working well for her  Uterus appropriate in size  Continue with psych for med recommendations  [phone] for intercourse and exercise as tolerated  To call with any further questions or concerns    Supervising physician is Dr. Adri Diaz. Greater than 50% of this 20 minute visit is spent in counseling to the above topics.     Keyonna Mcgill, APRN - CNP

## 2022-08-04 NOTE — TELEPHONE ENCOUNTER
Phone call to patient at [phone] due to EPDS score of 17 after delivery. No answer; message left requesting call back.      KELECHI Rodriguez, [address] [facility]   [phone]

## 2022-08-09 ENCOUNTER — OFFICE VISIT (OUTPATIENT)
Dept: INTERNAL MEDICINE CLINIC | Facility: CLINIC | Age: 22
End: 2022-08-09
Payer: MEDICAID

## 2022-08-09 VITALS
SYSTOLIC BLOOD PRESSURE: 114 MMHG | WEIGHT: 157 LBS | HEIGHT: 60 IN | DIASTOLIC BLOOD PRESSURE: 72 MMHG | BODY MASS INDEX: 30.82 KG/M2

## 2022-08-09 DIAGNOSIS — E55.9 VITAMIN D DEFICIENCY: ICD-10-CM

## 2022-08-09 DIAGNOSIS — O99.283 HYPOTHYROID IN PREGNANCY, ANTEPARTUM, THIRD TRIMESTER: ICD-10-CM

## 2022-08-09 DIAGNOSIS — E03.9 HYPOTHYROID IN PREGNANCY, ANTEPARTUM, THIRD TRIMESTER: ICD-10-CM

## 2022-08-09 DIAGNOSIS — F41.9 ANXIETY: Primary | ICD-10-CM

## 2022-08-09 LAB
25(OH)D3 SERPL-MCNC: 25 NG/ML (ref 30–100)
ALBUMIN SERPL-MCNC: 4 G/DL (ref 3.5–5)
ALBUMIN/GLOB SERPL: 1 {RATIO} (ref 1.2–3.5)
ALP SERPL-CCNC: 103 U/L (ref 50–136)
ALT SERPL-CCNC: 40 U/L (ref 12–65)
ANION GAP SERPL CALC-SCNC: 6 MMOL/L (ref 7–16)
AST SERPL-CCNC: 24 U/L (ref 15–37)
BASOPHILS # BLD: 0.1 K/UL (ref 0–0.2)
BASOPHILS NFR BLD: 1 % (ref 0–2)
BILIRUB SERPL-MCNC: 0.5 MG/DL (ref 0.2–1.1)
BUN SERPL-MCNC: 11 MG/DL (ref 6–23)
CALCIUM SERPL-MCNC: 9.7 MG/DL (ref 8.3–10.4)
CHLORIDE SERPL-SCNC: 103 MMOL/L (ref 98–107)
CO2 SERPL-SCNC: 28 MMOL/L (ref 21–32)
CREAT SERPL-MCNC: 0.5 MG/DL (ref 0.6–1)
DIFFERENTIAL METHOD BLD: NORMAL
EOSINOPHIL # BLD: 0.2 K/UL (ref 0–0.8)
EOSINOPHIL NFR BLD: 2 % (ref 0.5–7.8)
ERYTHROCYTE [DISTWIDTH] IN BLOOD BY AUTOMATED COUNT: 14.4 % (ref 11.9–14.6)
GLOBULIN SER CALC-MCNC: 3.9 G/DL (ref 2.3–3.5)
GLUCOSE SERPL-MCNC: 84 MG/DL (ref 65–100)
HCT VFR BLD AUTO: 41.6 % (ref 35.8–46.3)
HGB BLD-MCNC: 13.1 G/DL (ref 11.7–15.4)
IMM GRANULOCYTES # BLD AUTO: 0 K/UL (ref 0–0.5)
IMM GRANULOCYTES NFR BLD AUTO: 0 % (ref 0–5)
LYMPHOCYTES # BLD: 2.1 K/UL (ref 0.5–4.6)
LYMPHOCYTES NFR BLD: 30 % (ref 13–44)
MCH RBC QN AUTO: 26.8 PG (ref 26.1–32.9)
MCHC RBC AUTO-ENTMCNC: 31.5 G/DL (ref 31.4–35)
MCV RBC AUTO: 85.2 FL (ref 79.6–97.8)
MONOCYTES # BLD: 0.6 K/UL (ref 0.1–1.3)
MONOCYTES NFR BLD: 9 % (ref 4–12)
NEUTS SEG # BLD: 4.1 K/UL (ref 1.7–8.2)
NEUTS SEG NFR BLD: 58 % (ref 43–78)
NRBC # BLD: 0 K/UL (ref 0–0.2)
PLATELET # BLD AUTO: 384 K/UL (ref 150–450)
PMV BLD AUTO: 10.5 FL (ref 9.4–12.3)
POTASSIUM SERPL-SCNC: 4.5 MMOL/L (ref 3.5–5.1)
PROT SERPL-MCNC: 7.9 G/DL (ref 6.3–8.2)
RBC # BLD AUTO: 4.88 M/UL (ref 4.05–5.2)
SODIUM SERPL-SCNC: 137 MMOL/L (ref 136–145)
T4 FREE SERPL-MCNC: 0.8 NG/DL (ref 0.78–1.46)
TSH, 3RD GENERATION: <0.005 UIU/ML (ref 0.36–3.74)
WBC # BLD AUTO: 7.1 K/UL (ref 4.3–11.1)

## 2022-08-09 PROCEDURE — 99214 OFFICE O/P EST MOD 30 MIN: CPT | Performed by: PHYSICIAN ASSISTANT

## 2022-08-09 RX ORDER — VENLAFAXINE HYDROCHLORIDE 150 MG/1
150 CAPSULE, EXTENDED RELEASE ORAL DAILY
Qty: 30 CAPSULE | Refills: 0 | Status: SHIPPED | OUTPATIENT
Start: 2022-08-09

## 2022-08-09 ASSESSMENT — PATIENT HEALTH QUESTIONNAIRE - PHQ9
2. FEELING DOWN, DEPRESSED OR HOPELESS: 1
1. LITTLE INTEREST OR PLEASURE IN DOING THINGS: 1
SUM OF ALL RESPONSES TO PHQ QUESTIONS 1-9: 2
SUM OF ALL RESPONSES TO PHQ9 QUESTIONS 1 & 2: 2

## 2022-08-09 ASSESSMENT — ENCOUNTER SYMPTOMS
DIARRHEA: 0
CONSTIPATION: 0
ABDOMINAL PAIN: 0
COUGH: 0
COLOR CHANGE: 0
CHEST TIGHTNESS: 0
NAUSEA: 0
VOMITING: 0
EYE PAIN: 0
WHEEZING: 0
VOICE CHANGE: 0
SORE THROAT: 0
SHORTNESS OF BREATH: 0

## 2022-08-09 NOTE — TELEPHONE ENCOUNTER
Yes, she can call her pharmacy she has filled these meds at and they can give proscriber and start dates

## 2022-08-09 NOTE — PROGRESS NOTES
PROGRESS NOTE    Chief Complaint   Patient presents with    Follow-up Chronic Condition     Pt here for f/u celso           HPI  HPI    Past Medical History, Past Surgical History, Family history, Social History, and Medications were all reviewed with the patient today and updated as necessary. Current Outpatient Medications   Medication Sig Dispense Refill    venlafaxine (EFFEXOR XR) 150 MG extended release capsule Take 1 capsule by mouth in the morning. 30 capsule 0    Prenatal Vit w/Qy-Dcwhvdqid-HH (PNV PO) Take 1 tablet by mouth daily      norethindrone (ORTHO MICRONOR) 0.35 MG tablet Take 1 tablet by mouth daily 30 tablet 11     No current facility-administered medications for this visit. No Known Allergies  Patient Active Problem List   Diagnosis    Hypothyroid in pregnancy, antepartum, third trimester    High-risk pregnancy in third trimester    Obesity affecting pregnancy in third trimester, antepartum    Pneumonia affecting pregnancy in third trimester    Anemia affecting pregnancy in third trimester    Preeclampsia, third trimester    Anxiety during pregnancy in third trimester, antepartum     premature rupture of membranes (PPROM) with unknown onset of labor    Major depressive disorder, single episode with anxious distress    Nausea without vomiting    Vitamin D deficiency     Past Medical History:   Diagnosis Date    Acute respiratory failure with hypoxia ([address] 2022    Anemia affecting pregnancy in third trimester 2022    Anxiety and depression 11/3/2021    No current meds.     Aspiration pneumonia ([address] 2022    Family history of thyroid cancer 2019    Goiter     Hashimoto's thyroiditis     Hypoxia 2022    Irritable bowel syndrome with constipation     Irritable bowel syndrome with constipation     Major depressive disorder, single episode with anxious distress 10/11/2018    Preeclampsia, third trimester 2022    Sepsis due to anaerobic streptococci ([address] 2022 Shortness of breath during pregnancy 5/1/2022    Subclinical hypothyroidism 11/4/2021    Vitamin D deficiency     Vitamin D deficiency      Past Surgical History:   Procedure Laterality Date    COLONOSCOPY  2020    GYN  03/17/2020    IUD placement    ORTHOPEDIC SURGERY  age 1    Achilles tendon lengthening    TONSILLECTOMY       Family History   Problem Relation Age of Onset    Asthma Brother     Thyroid Cancer Maternal Cousin     Dementia Paternal Grandmother     Heart Disease Maternal Grandmother     Ovarian Cancer Maternal Grandmother     Thyroid Disease Father         hyperthyroidism    Asthma Mother     Thyroid Disease Mother         hypothyroidism    COPD Mother      Social History     Tobacco Use    Smoking status: Never    Smokeless tobacco: Never   Substance Use Topics    Alcohol use: Never         ROS  Review of Systems   Constitutional:  Negative for fatigue, fever and unexpected weight change. HENT:  Negative for congestion, ear pain, hearing loss, sore throat, tinnitus and voice change. Eyes:  Negative for pain and visual disturbance. Respiratory:  Negative for cough, chest tightness, shortness of breath and wheezing. Cardiovascular:  Negative for chest pain, palpitations and leg swelling. Gastrointestinal:  Negative for abdominal pain, constipation, diarrhea, nausea and vomiting. Genitourinary:  Negative for dysuria, frequency, hematuria and urgency. Musculoskeletal:  Negative for arthralgias, gait problem, joint swelling and neck pain. Skin:  Negative for color change and rash. Neurological:  Negative for dizziness, syncope, numbness and headaches. Hematological:  Negative for adenopathy. Psychiatric/Behavioral:  Negative for decreased concentration, hallucinations, sleep disturbance and suicidal ideas. The patient is nervous/anxious.         OBJECTIVE:  /72   Ht 5' (1.524 m)   Wt 157 lb (71.2 kg)   Breastfeeding Yes   BMI 30.66 kg/m²      PHYSICAL EXAM  Physical Exam  Constitutional:       Appearance: Normal appearance. HENT:      Head: Normocephalic and atraumatic. Right Ear: External ear normal.      Left Ear: External ear normal.      Nose: Nose normal.   Eyes:      General:         Right eye: No discharge. Left eye: No discharge. Extraocular Movements: Extraocular movements intact. Conjunctiva/sclera: Conjunctivae normal.   Cardiovascular:      Rate and Rhythm: Normal rate and regular rhythm. Heart sounds: Normal heart sounds. No murmur heard. Pulmonary:      Effort: Pulmonary effort is normal.   Musculoskeletal:         General: Normal range of motion. Cervical back: Normal range of motion and neck supple. Skin:     Findings: No erythema or rash. Neurological:      General: No focal deficit present. Mental Status: She is alert and oriented to person, place, and time. Psychiatric:         Mood and Affect: Mood normal.        Medical problems and test results were reviewed with the patient today. No results found for this or any previous visit (from the past 672 hour(s)). ASSESSMENT and PLAN  Torie Robertson was seen today for follow-up chronic condition. Diagnoses and all orders for this visit:    Anxiety  Comments:  followed by Dr. Reid Rodriguez at [facility], pt has stopped effexoer and was only on adderal as of 9/2021, was restarted by her gyn needs refill , psych appt 8/17     Orders:  -     venlafaxine (EFFEXOR XR) 150 MG extended release capsule; Take 1 capsule by mouth in the morning. Hypothyroid in pregnancy, antepartum, third trimester  Comments:  pt states she stopped her tyhroid meds after birth of child 3 month ago, was on 25 mcg, labs today  Orders:  -     CBC with Auto Differential; Future  -     TSH; Future  -     T4, Free; Future  -     Comprehensive Metabolic Panel; Future    Vitamin D deficiency  -     Vitamin D 25 Hydroxy;  Future      FOLLOW UP  Return if symptoms worsen or fail to improve, for Please keep previous appt as scheduled.

## 2022-08-30 ENCOUNTER — TELEPHONE (OUTPATIENT)
Dept: INTERNAL MEDICINE CLINIC | Facility: CLINIC | Age: 22
End: 2022-08-30

## 2022-08-30 DIAGNOSIS — E55.9 VITAMIN D DEFICIENCY: Primary | ICD-10-CM

## 2022-08-30 NOTE — TELEPHONE ENCOUNTER
I called pt and I read her the lab results note from [facility] dated 8/10/22 re: her abnormal TSH and Free T 4 . Pt said she has seen Dr Abner Wolf in the past and she will go ahead and call their office to set up an celso. (I let her know if she has any issues with getting an celso to call me back and we'll sent a referral to them .  Pt wanted  to know also about her Vit D which I did let her know this was low and I will sent a message to [facility]  re: her Vit d for advise ,pt said she is not taking any Vit d supplements

## 2022-08-31 RX ORDER — ACETAMINOPHEN 160 MG
4000 TABLET,DISINTEGRATING ORAL DAILY
COMMUNITY
End: 2022-09-22

## 2022-08-31 NOTE — TELEPHONE ENCOUNTER
Yes, she needs to see endo for her thyroid, and please ask to start Vit D OTC 4000 iu and will recheck this in 6-7 weeks

## 2022-08-31 NOTE — TELEPHONE ENCOUNTER
Pt was made aware to start taking OTC Vit D 2,000 units taking two per day (4,000 U daily) and she is aware Vit D lab work was ordered to be repeated in approx 6=7 wks.  Pt said she will be calling [facility] today to set up an celso with them

## 2022-09-13 ENCOUNTER — TELEPHONE (OUTPATIENT)
Dept: INTERNAL MEDICINE CLINIC | Facility: CLINIC | Age: 22
End: 2022-09-13

## 2022-09-13 NOTE — TELEPHONE ENCOUNTER
Patient call advising that her appointment with endocrinology is not until 2/7/23; patient is asking if any treatment is necessary for abnormal tsh and free t4 until that appointment

## 2022-09-15 DIAGNOSIS — O99.283 HYPOTHYROID IN PREGNANCY, ANTEPARTUM, THIRD TRIMESTER: Primary | ICD-10-CM

## 2022-09-15 DIAGNOSIS — E03.9 HYPOTHYROID IN PREGNANCY, ANTEPARTUM, THIRD TRIMESTER: Primary | ICD-10-CM

## 2022-09-15 NOTE — TELEPHONE ENCOUNTER
My lab result note 8/10/2022 states to send to endo urgent/ please check on the referral and pt need endo appt urgent, not sure 2/2023 is good. Sooner is better.  Please resend if needed ot call them, she has seen 's group in hospital

## 2022-09-15 NOTE — TELEPHONE ENCOUNTER
Salvador I called Dr Liya Saba office to see if pt's celso on 2/7/23 could be moved up sooner. I was told pt is on a waiting list. I  see a note dated 8/10/21 stain pt needs a referral to Dr. Liya Saba as URGENT but the was NOT taking care of because the note never came to a nurse to sent off.  I sent a referral today as urgent and I let endro know referral is being sent today

## 2022-09-15 NOTE — TELEPHONE ENCOUNTER
Pt has an celso with Dr Garg Risk for 2/7/233 BUT today I sent an urgent referral so pt may be bale to be seen sooner

## 2022-09-19 ENCOUNTER — TELEPHONE (OUTPATIENT)
Dept: ENDOCRINOLOGY | Age: 22
End: 2022-09-19

## 2022-09-19 NOTE — TELEPHONE ENCOUNTER
This is your patient who has a follow up scheduled Feb. 2023 and has been placed on your waiting list as urgent. She is currently pregnant and her PCP would like her seen ASAP. How should we proceed?

## 2022-09-22 ENCOUNTER — OFFICE VISIT (OUTPATIENT)
Dept: ENDOCRINOLOGY | Age: 22
End: 2022-09-22
Payer: MEDICAID

## 2022-09-22 VITALS
OXYGEN SATURATION: 98 % | WEIGHT: 165 LBS | SYSTOLIC BLOOD PRESSURE: 120 MMHG | DIASTOLIC BLOOD PRESSURE: 82 MMHG | BODY MASS INDEX: 32.22 KG/M2 | HEART RATE: 103 BPM

## 2022-09-22 DIAGNOSIS — Z86.39 HISTORY OF HYPOTHYROIDISM: ICD-10-CM

## 2022-09-22 DIAGNOSIS — E05.90 THYROTOXICOSIS WITHOUT CRISIS: ICD-10-CM

## 2022-09-22 DIAGNOSIS — E55.9 VITAMIN D DEFICIENCY: ICD-10-CM

## 2022-09-22 DIAGNOSIS — E05.90 THYROTOXICOSIS WITHOUT CRISIS: Primary | ICD-10-CM

## 2022-09-22 PROCEDURE — 99214 OFFICE O/P EST MOD 30 MIN: CPT | Performed by: INTERNAL MEDICINE

## 2022-09-22 RX ORDER — VENLAFAXINE HYDROCHLORIDE 75 MG/1
CAPSULE, EXTENDED RELEASE ORAL
COMMUNITY
Start: 2022-08-24

## 2022-09-22 RX ORDER — HYDROXYZINE HYDROCHLORIDE 25 MG/1
TABLET, FILM COATED ORAL
COMMUNITY
Start: 2022-08-24

## 2022-09-22 ASSESSMENT — ENCOUNTER SYMPTOMS
DIARRHEA: 0
CONSTIPATION: 1

## 2022-09-22 NOTE — PROGRESS NOTES
Patricia Barahona MD, [address]            Reason for visit: follow-up of thyroid dysfunction      ASSESSMENT AND PLAN:    1. Thyrotoxicosis without crisis  Ms. Lugo has a history of Hashimoto's thyroiditis and was treated with levothyroxine during her most recent pregnancy. The pregnancy ended 4 weeks early when she was induced for preeclampsia. She is currently 4 months postpartum. She is now thyrotoxic. She probably has postpartum thyroiditis. I will check thyroid function and thyroid-stimulating immunoglobulins today. Return in 3 months with labs. - TSH; Future  - T4, Free; Future  - T3; Future  - Thyroid Stimulating Immunoglobulin; Future  - TSH; Future  - T4, Free; Future  - T3; Future    2. History of hypothyroidism    3. Vitamin D deficiency  She has a history of vitamin D deficiency but is currently untreated. I will check vitamin D today and again in 3 months.  - Vitamin D 25 Hydroxy; Future  - Vitamin D 25 Hydroxy; Future      Follow-up and Dispositions    Return in about 3 months (around 2022). History of Present Illness:    THYROID DISEASE  Christie Dunbar is seen today for follow-up of Hashimoto's thyroiditis, diagnosed in . She was diagnosed with hypothyroidism in 2021. Symptoms: See review of systems below     Menses: Mirena IUD removed 2020. Menses usually regular. Currently amenorrheic secondary to pregnancy. Pregnancy history:  1 para 1 (delivered 2022), currently breastfeeding    Treatment of thyroid dysfunction: Levothyroxine 25 mcg daily was started 2021. The patient discontinued this in May 2022. Risk factors for malignancy: There is not a history of radiation to the head/neck. The patient does have a family history of thyroid cancer (maternal cousin).        Prior imagin/10/2019: Ultrasound ([facility])- Right lobe 5.3 x 2.3 x 1.9 cm, isthmus 0.7 cm, left lobe 5.1 x 2.1 x 2.2 cm. Heterogeneous echotexture. Increased blood flow. No nodules. Prior laboratory evaluation:  12/5/2019: 25-hydroxy vitamin D 14.7.  12/11/2019: TSH 1.580, antithyroid peroxidase antibodies 95 (+).  3/11/2020: TSH 1.780.  4/20/2020: TSH 1.940, free T4 0.8, antithyroid peroxidase antibodies 143 (+), 25-hydroxy vitamin D 26.2. 6/15/2021: TSH 2.310, free T4 1.00, antithyroid peroxidase antibodies 144 (+), 25-hydroxy vitamin D 23.1.  11/3/2021: TSH 4.220, free T4 0.95.  2/2/2022: TSH 2.480, free T4 0.89.  4/1/2022: TSH 2.900, free T4 0.78.  5/1/2022: TSH 3.710, free T4 0.8.  8/9/2022: TSH less than 0.005, free T4 0.8. Prior biopsies: none    Review of Systems   Constitutional:  Positive for fatigue and unexpected weight change (currently 55 pounds above pre-pregnancy weight (gained 10 pounds in 6 weeks)). Cardiovascular:  Positive for palpitations (worse at rest). Gastrointestinal:  Positive for constipation. Negative for diarrhea. Psychiatric/Behavioral:  Negative for sleep disturbance. /82 (Site: Left Upper Arm, Position: Sitting)   Pulse (!) 103   Wt 165 lb (74.8 kg)   SpO2 98%   BMI 32.22 kg/m²   Wt Readings from Last 3 Encounters:   09/22/22 165 lb (74.8 kg)   08/09/22 157 lb (71.2 kg)   08/04/22 155 lb 12.8 oz (70.7 kg)       Physical Exam  HENT:      Head: Normocephalic. Neck:      Thyroid: No thyroid mass or thyromegaly. Cardiovascular:      Rate and Rhythm: Normal rate. Pulmonary:      Effort: No respiratory distress. Breath sounds: Normal breath sounds. Neurological:      Mental Status: She is alert.    Psychiatric:         Mood and Affect: Mood normal.         Behavior: Behavior normal.       Orders Placed This Encounter   Procedures    TSH     Standing Status:   Future     Standing Expiration Date:   9/22/2023    T4, Free     Standing Status:   Future     Standing Expiration Date:   9/22/2023    T3     Standing Status:   Future Standing Expiration Date:   9/22/2023    Thyroid Stimulating Immunoglobulin     Standing Status:   Future     Standing Expiration Date:   9/22/2023    Vitamin D 25 Hydroxy     Standing Status:   Future     Standing Expiration Date:   9/22/2023    TSH     Standing Status:   Future     Standing Expiration Date:   9/22/2023    T4, Free     Standing Status:   Future     Standing Expiration Date:   9/22/2023    T3     Standing Status:   Future     Standing Expiration Date:   9/22/2023    Vitamin D 25 Hydroxy     Standing Status:   Future     Standing Expiration Date:   9/22/2023         Current Outpatient Medications   Medication Sig Dispense Refill    venlafaxine (EFFEXOR XR) 75 MG extended release capsule TAKE ONE CAPSULE BY MOUTH EVERY MORNING WITH EFFEXOR XR 150MG TO TOTAL 225MG      hydrOXYzine HCl (ATARAX) 25 MG tablet TAKE 1/2 TO 1 TABLET BY MOUTH DAILY AS NEEDED FOR ANXIETY      venlafaxine (EFFEXOR XR) 150 MG extended release capsule Take 1 capsule by mouth in the morning. 30 capsule 0    Prenatal Vit w/Jb-Pxtyesvvr-TY (PNV PO) Take 1 tablet by mouth daily      norethindrone (ORTHO MICRONOR) 0.35 MG tablet Take 1 tablet by mouth daily 30 tablet 11     No current facility-administered medications for this visit. Oliva Solares MD, FACE      Portions of this note were generated with the assistance of voice recognition software. As such, some errors in transcription may be present.

## 2022-09-23 DIAGNOSIS — E03.9 PRIMARY HYPOTHYROIDISM: Primary | ICD-10-CM

## 2022-09-23 LAB
25(OH)D3 SERPL-MCNC: 24.3 NG/ML (ref 30–100)
T3 SERPL-MCNC: 0.75 NG/ML (ref 0.6–1.81)
T4 FREE SERPL-MCNC: 0.5 NG/DL (ref 0.78–1.46)
TSH, 3RD GENERATION: 10.9 UIU/ML (ref 0.36–3.74)

## 2022-09-23 RX ORDER — LEVOTHYROXINE SODIUM 0.05 MG/1
50 TABLET ORAL DAILY
Qty: 90 TABLET | Refills: 3 | Status: SHIPPED | OUTPATIENT
Start: 2022-09-23

## 2022-09-23 NOTE — PROGRESS NOTES
Lesley message to patient:    Thank you for checking labs. You were hyperthyroid a few weeks ago but are now hypothyroid. That suggests that you had postpartum thyroiditis. I would recommend starting levothyroxine 50 mcg/day. I sent a prescription for that dose to your pharmacy. Please remember to recheck labs a few days before the next appointment with me. Let me know if you have questions.

## 2022-09-26 LAB — TSI ACT/NOR SER: <0.1 IU/L (ref 0–0.55)

## 2023-01-03 NOTE — PROGRESS NOTES
05/02/22 2226   Oxygen Therapy   O2 Sat (%) 100 %   O2 Device Hi flow nasal cannula   Skin Assessment Clean, dry, & intact   Skin Protection for O2 Device No   O2 Flow Rate (L/min) 30 l/min  (decreased from 40L)   O2 Temperature 87.8 °F (31 °C)   FIO2 (%) 45 %   New pulse ox applied at this time with instant readings of 100%. Decreased to 30L at this time. Patient sitting up eating dinner without any distress. Was appreciative of new probe. Will continue to monitor. Head Injury
